# Patient Record
Sex: FEMALE | Race: WHITE | NOT HISPANIC OR LATINO | Employment: OTHER | URBAN - METROPOLITAN AREA
[De-identification: names, ages, dates, MRNs, and addresses within clinical notes are randomized per-mention and may not be internally consistent; named-entity substitution may affect disease eponyms.]

---

## 2019-04-30 ENCOUNTER — TELEPHONE (OUTPATIENT)
Dept: RHEUMATOLOGY | Facility: CLINIC | Age: 59
End: 2019-04-30

## 2019-04-30 NOTE — TELEPHONE ENCOUNTER
Printed telephone request for new patient appointment. Placed in referral folder to be contacted to schedule

## 2019-04-30 NOTE — TELEPHONE ENCOUNTER
----- Message from Zion Rasmussen sent at 4/30/2019  9:53 AM CDT -----  Contact: same  Patient called in and stated she had already spoken to Dr. Prieto, when at an appt for her sister, about becoming a NP.  Patient stated Dr. Prieto told her to call and get a file done & send message so nurse can schedule an appt.    Patient call back number is [phone]

## 2019-05-03 ENCOUNTER — TELEPHONE (OUTPATIENT)
Dept: RHEUMATOLOGY | Facility: CLINIC | Age: 59
End: 2019-05-03

## 2019-05-03 NOTE — TELEPHONE ENCOUNTER
----- Message from Kayla Zee sent at 5/3/2019 10:28 AM CDT -----  Contact: Pt  Type: Needs Medical Advice    Who Called:  Patient  Best Call Back Number: [phone] ([phone])   Additional Information: Pt stated she spoke to Dr Prieto's nurse she said she is still waiting on a call for an appt she said she spoke to Dr Prieto her self and was told that the  will see her and take her medicaid asked for a call back was told someone will be getting back with her for an appointment

## 2019-05-03 NOTE — TELEPHONE ENCOUNTER
Returned patient call regarding new patient appointment. Nurse informed of first available new patient appointment with Dr Prieto. Patient requested appointment to be made and placed on the wait list. Patient aware of date and time of appointment.

## 2019-11-21 ENCOUNTER — OFFICE VISIT (OUTPATIENT)
Dept: RHEUMATOLOGY | Facility: CLINIC | Age: 59
End: 2019-11-21
Payer: MEDICAID

## 2019-11-21 ENCOUNTER — HOSPITAL ENCOUNTER (OUTPATIENT)
Dept: RADIOLOGY | Facility: HOSPITAL | Age: 59
Discharge: HOME | End: 2019-11-21
Attending: INTERNAL MEDICINE
Payer: MEDICAID

## 2019-11-21 ENCOUNTER — IMMUNIZATION (OUTPATIENT)
Dept: PHARMACY | Facility: CLINIC | Age: 59
End: 2019-11-21
Payer: MEDICAID

## 2019-11-21 VITALS
DIASTOLIC BLOOD PRESSURE: 75 MMHG | SYSTOLIC BLOOD PRESSURE: 129 MMHG | BODY MASS INDEX: 20.82 KG/M2 | HEART RATE: 94 BPM | WEIGHT: 110.25 LBS | HEIGHT: 61 IN

## 2019-11-21 DIAGNOSIS — M48.02 CERVICAL SPINAL STENOSIS: ICD-10-CM

## 2019-11-21 DIAGNOSIS — G61.9 INFLAMMATORY NEUROPATHY: ICD-10-CM

## 2019-11-21 DIAGNOSIS — K13.79 MOUTH SORE: ICD-10-CM

## 2019-11-21 DIAGNOSIS — R27.0 ATAXIA: ICD-10-CM

## 2019-11-21 DIAGNOSIS — R76.8 ANA POSITIVE: Primary | ICD-10-CM

## 2019-11-21 DIAGNOSIS — M48.062 SPINAL STENOSIS OF LUMBAR REGION WITH NEUROGENIC CLAUDICATION: ICD-10-CM

## 2019-11-21 DIAGNOSIS — R76.8 ANA POSITIVE: ICD-10-CM

## 2019-11-21 PROCEDURE — 72100 XR LUMBAR SPINE AP AND LATERAL: ICD-10-PCS | Mod: 26,,, | Performed by: RADIOLOGY

## 2019-11-21 PROCEDURE — 72070 X-RAY EXAM THORAC SPINE 2VWS: CPT | Mod: 26,,, | Performed by: RADIOLOGY

## 2019-11-21 PROCEDURE — 72100 X-RAY EXAM L-S SPINE 2/3 VWS: CPT | Mod: 26,,, | Performed by: RADIOLOGY

## 2019-11-21 PROCEDURE — 96372 THER/PROPH/DIAG INJ SC/IM: CPT | Mod: PBBFAC,PO

## 2019-11-21 PROCEDURE — 99205 PR OFFICE/OUTPT VISIT, NEW, LEVL V, 60-74 MIN: ICD-10-PCS | Mod: 25,S$PBB,, | Performed by: INTERNAL MEDICINE

## 2019-11-21 PROCEDURE — 72070 X-RAY EXAM THORAC SPINE 2VWS: CPT | Mod: TC,FY,PO

## 2019-11-21 PROCEDURE — 72070 XR THORACIC SPINE AP LATERAL: ICD-10-PCS | Mod: 26,,, | Performed by: RADIOLOGY

## 2019-11-21 PROCEDURE — 99215 OFFICE O/P EST HI 40 MIN: CPT | Mod: PBBFAC,25,PO | Performed by: INTERNAL MEDICINE

## 2019-11-21 PROCEDURE — 72100 X-RAY EXAM L-S SPINE 2/3 VWS: CPT | Mod: TC,FY,PO

## 2019-11-21 PROCEDURE — 99999 PR PBB SHADOW E&M-EST. PATIENT-LVL V: CPT | Mod: PBBFAC,,, | Performed by: INTERNAL MEDICINE

## 2019-11-21 PROCEDURE — 99205 OFFICE O/P NEW HI 60 MIN: CPT | Mod: 25,S$PBB,, | Performed by: INTERNAL MEDICINE

## 2019-11-21 PROCEDURE — 72040 X-RAY EXAM NECK SPINE 2-3 VW: CPT | Mod: TC,FY,PO

## 2019-11-21 PROCEDURE — 72040 X-RAY EXAM NECK SPINE 2-3 VW: CPT | Mod: 26,,, | Performed by: RADIOLOGY

## 2019-11-21 PROCEDURE — 99999 PR PBB SHADOW E&M-EST. PATIENT-LVL V: ICD-10-PCS | Mod: PBBFAC,,, | Performed by: INTERNAL MEDICINE

## 2019-11-21 PROCEDURE — 72040 XR CERVICAL SPINE AP LATERAL: ICD-10-PCS | Mod: 26,,, | Performed by: RADIOLOGY

## 2019-11-21 RX ORDER — ASPIRIN 325 MG
325 TABLET ORAL
COMMUNITY
Start: 2015-11-07

## 2019-11-21 RX ORDER — KETOROLAC TROMETHAMINE 30 MG/ML
60 INJECTION, SOLUTION INTRAMUSCULAR; INTRAVENOUS
Status: COMPLETED | OUTPATIENT
Start: 2019-11-21 | End: 2019-11-21

## 2019-11-21 RX ORDER — DOCUSATE SODIUM 100 MG/1
100 CAPSULE, LIQUID FILLED ORAL
COMMUNITY

## 2019-11-21 RX ORDER — PRAVASTATIN SODIUM 40 MG/1
40 TABLET ORAL
COMMUNITY
Start: 2015-02-02

## 2019-11-21 RX ORDER — VALACYCLOVIR HYDROCHLORIDE 1 G/1
1000 TABLET, FILM COATED ORAL 3 TIMES DAILY
Qty: 42 TABLET | Refills: 3 | Status: SHIPPED | OUTPATIENT
Start: 2019-11-21 | End: 2020-03-15

## 2019-11-21 RX ORDER — MIRTAZAPINE 15 MG/1
TABLET, FILM COATED ORAL
COMMUNITY
Start: 2019-10-07 | End: 2019-11-21

## 2019-11-21 RX ORDER — DICYCLOMINE HYDROCHLORIDE 20 MG/1
20 TABLET ORAL EVERY 6 HOURS
Qty: 120 TABLET | Refills: 2 | Status: SHIPPED | OUTPATIENT
Start: 2019-11-21 | End: 2019-12-21

## 2019-11-21 RX ORDER — MULTIVITAMIN
1 TABLET ORAL
COMMUNITY

## 2019-11-21 RX ORDER — FLUOXETINE 10 MG/1
TABLET ORAL
COMMUNITY
Start: 2015-02-02 | End: 2021-02-12

## 2019-11-21 RX ORDER — CYANOCOBALAMIN 1000 UG/ML
1000 INJECTION, SOLUTION INTRAMUSCULAR; SUBCUTANEOUS
Status: COMPLETED | OUTPATIENT
Start: 2019-11-21 | End: 2019-11-21

## 2019-11-21 RX ORDER — PROMETHAZINE HYDROCHLORIDE 12.5 MG/1
TABLET ORAL
COMMUNITY
Start: 2019-01-16 | End: 2023-02-28

## 2019-11-21 RX ORDER — ONDANSETRON 4 MG/1
4 TABLET, ORALLY DISINTEGRATING ORAL EVERY 8 HOURS PRN
COMMUNITY
Start: 2018-11-05

## 2019-11-21 RX ORDER — PREGABALIN 25 MG/1
25 CAPSULE ORAL 2 TIMES DAILY
Qty: 60 CAPSULE | Refills: 6 | Status: SHIPPED | OUTPATIENT
Start: 2019-11-21 | End: 2019-12-13 | Stop reason: SDUPTHER

## 2019-11-21 RX ORDER — PANTOPRAZOLE SODIUM 40 MG/1
40 TABLET, DELAYED RELEASE ORAL
COMMUNITY
Start: 2018-01-21 | End: 2020-02-14

## 2019-11-21 RX ORDER — TRIAMCINOLONE ACETONIDE 1 MG/G
OINTMENT TOPICAL 2 TIMES DAILY
Qty: 30 G | Refills: 3 | Status: SHIPPED | OUTPATIENT
Start: 2019-11-21

## 2019-11-21 RX ORDER — BUSPIRONE HYDROCHLORIDE 10 MG/1
10 TABLET ORAL
COMMUNITY
Start: 2019-07-21 | End: 2021-02-12

## 2019-11-21 RX ORDER — ASPIRIN 81 MG/1
81 TABLET ORAL DAILY
COMMUNITY

## 2019-11-21 RX ORDER — DICLOFENAC SODIUM 10 MG/G
2 GEL TOPICAL
COMMUNITY
Start: 2019-01-04

## 2019-11-21 RX ORDER — CLONIDINE HYDROCHLORIDE 0.1 MG/1
TABLET ORAL
COMMUNITY
Start: 2015-02-02 | End: 2022-03-21

## 2019-11-21 RX ORDER — DULOXETIN HYDROCHLORIDE 20 MG/1
20 CAPSULE, DELAYED RELEASE ORAL
COMMUNITY
Start: 2019-10-07 | End: 2019-11-21

## 2019-11-21 RX ORDER — GLIPIZIDE 5 MG/1
5 TABLET ORAL
COMMUNITY
Start: 2014-09-12

## 2019-11-21 RX ORDER — GABAPENTIN 300 MG/1
300 CAPSULE ORAL
COMMUNITY
Start: 2018-04-13 | End: 2019-11-21

## 2019-11-21 RX ORDER — MEDICAL SUPPLY, MISCELLANEOUS
MISCELLANEOUS MISCELLANEOUS
COMMUNITY
Start: 2013-10-28

## 2019-11-21 RX ORDER — POLYETHYLENE GLYCOL 3350 17 G/17G
17 POWDER, FOR SOLUTION ORAL
COMMUNITY
End: 2023-02-28

## 2019-11-21 RX ORDER — CYCLOBENZAPRINE HCL 10 MG
10 TABLET ORAL
COMMUNITY
Start: 2019-07-29 | End: 2020-01-14 | Stop reason: SDUPTHER

## 2019-11-21 RX ORDER — METHYLPREDNISOLONE ACETATE 80 MG/ML
80 INJECTION, SUSPENSION INTRA-ARTICULAR; INTRALESIONAL; INTRAMUSCULAR; SOFT TISSUE
Status: COMPLETED | OUTPATIENT
Start: 2019-11-21 | End: 2019-11-21

## 2019-11-21 RX ORDER — TRAZODONE HYDROCHLORIDE 100 MG/1
TABLET ORAL
COMMUNITY
Start: 2018-01-10

## 2019-11-21 RX ORDER — LISINOPRIL AND HYDROCHLOROTHIAZIDE 10; 12.5 MG/1; MG/1
1 TABLET ORAL
COMMUNITY
Start: 2019-07-03

## 2019-11-21 RX ADMIN — METHYLPREDNISOLONE ACETATE 80 MG: 80 INJECTION, SUSPENSION INTRA-ARTICULAR; INTRALESIONAL; INTRAMUSCULAR; SOFT TISSUE at 01:11

## 2019-11-21 RX ADMIN — KETOROLAC TROMETHAMINE 60 MG: 30 INJECTION, SOLUTION INTRAMUSCULAR at 01:11

## 2019-11-21 RX ADMIN — CYANOCOBALAMIN 1000 MCG: 1000 INJECTION, SOLUTION INTRAMUSCULAR at 01:11

## 2019-11-21 NOTE — PROGRESS NOTES
Subjective:       Patient ID: Malu Holland is a 59 y.o. female.    Chief Complaint: Pain and Fatigue    HPI: pt is a 58 yo female with a past h/o etoh disorder, she quit drinking x 4 years, she  A positive rei 1: 80, spinal stenosis, she had 90 lb weight loss dx with esophageal spasm, she has horrible balance when she  Walks a straight line, sh can walk wide base. Patient complains of arthralgias and myalgias for which has been present for a few years. Pain is located in multiple joints, both shoulder(s), both elbow(s), both wrist(s), both MCP(s): 1st, 2nd, 3rd, 4th and 5th, both PIP(s): 1st, 2nd, 3rd, 4th and 5th, both DIP(s): 1st and 2nd, both hip(s), both knee(s) and both MTP(s): 1st, 2nd, 3rd, 4th and 5th, is described as aching, pulsating, shooting and throbbing, and is constant, moderate .  Associated symptoms include: crepitation, decreased range of motion, edema, effusion, tenderness and warmth.  , spinal stenosis, dry eyes, dry mouth, rash in  Mouth painful.  Mother had sclerdoderma/ crest sister had  sjogrens    Review of Systems   Constitutional: Positive for activity change, appetite change, fatigue and unexpected weight change. Negative for chills, diaphoresis and fever.   HENT: Negative for congestion, dental problem, ear discharge, ear pain, facial swelling, mouth sores, nosebleeds, postnasal drip, rhinorrhea, sinus pressure, sneezing, sore throat, tinnitus, trouble swallowing and voice change.    Eyes: Negative for photophobia, pain, discharge, redness and itching.   Respiratory: Negative for apnea, cough, chest tightness, shortness of breath and wheezing.    Cardiovascular: Positive for leg swelling. Negative for chest pain and palpitations.   Gastrointestinal: Negative for abdominal distention, abdominal pain, constipation, diarrhea, nausea and vomiting.   Endocrine: Negative for cold intolerance, heat intolerance, polydipsia and polyuria.   Genitourinary: Negative for decreased urine volume,  "difficulty urinating, dysuria, flank pain, frequency, hematuria and urgency.   Musculoskeletal: Positive for arthralgias, back pain, gait problem, joint swelling, myalgias, neck pain and neck stiffness.   Skin: Positive for rash. Negative for pallor and wound.   Allergic/Immunologic: Positive for immunocompromised state.   Neurological: Negative for dizziness, tremors, weakness, numbness and headaches.   Hematological: Negative for adenopathy. Bruises/bleeds easily.   Psychiatric/Behavioral: Negative for sleep disturbance. The patient is not nervous/anxious.          Objective:   /75 (BP Location: Left arm, Patient Position: Sitting, BP Method: Small (Automatic))   Pulse 94   Ht 5' 1" (1.549 m)   Wt 50 kg (110 lb 3.7 oz)   BMI 20.83 kg/m²      Physical Exam   Vitals reviewed.  Constitutional: She is oriented to person, place, and time. She appears distressed.   HENT:   Head: Normocephalic and atraumatic.   Mouth/Throat: Oropharynx is clear and moist.   Eyes: EOM are normal. Pupils are equal, round, and reactive to light.   Neck: Neck supple. No thyromegaly present.   Cardiovascular: Normal rate, regular rhythm and normal heart sounds.  Exam reveals no gallop and no friction rub.    No murmur heard.  Pulmonary/Chest: Breath sounds normal. She has no wheezes. She has no rales. She exhibits no tenderness.   Abdominal: There is no tenderness. There is no rebound and no guarding.       Right Side Rheumatological Exam     Examination finds the elbow normal.    The patient is tender to palpation of the shoulder, wrist, knee, 1st PIP, 1st MCP, 2nd PIP, 2nd MCP, 3rd PIP, 3rd MCP, 4th PIP, 4th MCP, 5th PIP and 5th MCP    Shoulder Exam   Tenderness Location: acromion    Range of Motion   Active abduction: abnormal   Adduction: abnormal  Sensation: normal    Knee Exam   Tenderness Location: medial joint line and LCL  Patellofemoral Crepitus: positive  Effusion: positive  Sensation: normal    Hip Exam   Tenderness " Location: posterior and lateral  Sensation: normal    Elbow/Wrist Exam   Tenderness Location: no tenderness  Sensation: normal    Left Side Rheumatological Exam     The patient is tender to palpation of the shoulder, elbow, wrist, knee, 1st PIP, 1st MCP, 2nd PIP, 2nd MCP, 3rd PIP, 3rd MCP, 4th PIP, 4th MCP, 5th PIP and 5th MCP.    Shoulder Exam   Tenderness Location: acromion    Range of Motion   Active abduction: abnormal   Sensation: normal    Knee Exam   Tenderness Location: lateral joint line and medial joint line    Patellofemoral Crepitus: positive  Effusion: positive  Sensation: normal    Hip Exam   Tenderness Location: posterior and lateral  Sensation: normal    Elbow/Wrist Exam   Sensation: normal      Back/Neck Exam   General Inspection   Gait: abnormal       Tenderness Right paramedian tenderness of the Upper C-Spine, Upper T-Spine, Upper L-Spine and SI Joint.Left paramedian tenderness of the Upper C-Spine, Upper T-Spine, SI Joint and Upper L-Spine.    Back Range of Motion   Lateral Bend Right: abnormal  Lateral Bend Left: abnormal  Rotation Right: abnormal  Rotation Left: abnormal    Neck Range of Motion   Flexion: Severely limited  Extension: Severely limited  Right Lateral Bend: abnormal  Left Lateral Bend: abnormal  Right Rotation: abnormal  Left Rotation: abnormal    Back Tests   Right Side Tests    Squat Test: unable to perform   Left Side Tests    Squat: unable to perform  Lymphadenopathy:     She has no cervical adenopathy.   Neurological: She is alert and oriented to person, place, and time. She displays weakness and atrophy.   Reflex Scores:       Brachioradialis reflexes are 1+ on the left side.       Patellar reflexes are 1+ on the right side and 1+ on the left side.  Skin: Rash noted. There is erythema. There is pallor.     Psychiatric: Mood, memory, affect and judgment normal.   Musculoskeletal: She exhibits tenderness and deformity.        BRENNON SCREEN, REFLEX TO TITER (10/02/2017 10:48 AM  CDT)  BRENNON SCREEN, REFLEX TO TITER (10/02/2017 10:48 AM CDT)   Component Value Ref Range Performed At Pathologist Signature   BRENNON, IFA Positive (A)  Comment:   (NOTE)                                     Negative   <1:80                                     Borderline  1:80                                     Positive   >1:80  [facility]FORMED AT: [facility], [address] [place] [address], PH: [phone], DIRECTOR: RADHA BARRERA MD     [facility]       BRENNON SCREEN, REFLEX TO TITER (10/02/2017 10:48 AM CDT)   Specimen   Blood (substance) - Blood     BRENNON SCREEN, REFLEX TO TITER (10/02/2017 10:48 AM CDT)   Performing Organization Address City/State/Zipcode Phone Number   [facility]             Back to top of Lab Results       CBC and differential (10/02/2017 10:48 AM CDT)  CBC and differential (10/02/2017 10:48 AM CDT)   Component Value Ref Range Performed At Pathologist Signature   CBC PROFILE RESULTS:   [facility]     WBC 3.7 (L) 4.5 - 11.0 10^3/UL [facility]     Red Blood Cell Count 4.23 4.00 - 5.20 10^6/UL [facility]     Hemoglobin 13.4 12.0 - 16.0 GM/DL [facility]     Hematocrit 39.2 35.0 - 46.0 % [facility]     MCV 92.6 80.0 - 100.0 FL [facility]     MCH 31.8 26.0 - 34.0 PG [facility]     MCHC 34.3 31.0 - 37.0 G/DL [facility]     RDW 13.8 11.5 - 14.5 % [facility]     Platelet Cnt 139 130 - 400 10^3/UL [facility]     MPV 8.2 7.4 - 10.4 FL [facility]     DIFFERENTAL RESULTS:   [facility]     Differential Type AUTO   [facility]     Neutrophils Absolute 1.7 (L) 1.8 - 8.0 10^3/UL [facility]     Lymphocytes Absolute 1.4 1.1 - 5.0 10^3/UL [facility]     Monocytes Absolute 0.3 0.2 - 1.1 10^3/UL [facility]     Eosinophils Absolute 0.2 0.0 - 0.6 10^3/UL [facility]     Basophils Absolute 0.0 0.0 - 0.2 10^3/UL [facility]     Neutrophils Relatives 48 % [facility]     Lymphocytes Relative 39 % [facility]     Monocytes Relative 8 % [facility]     Eosinophils Relative 5 % [facility]     Basophils Relative 0 % [facility]       CBC and differential (10/02/2017 10:48 AM CDT)   Specimen   Blood  (substance) - Blood     CBC and differential (10/02/2017 10:48 AM CDT)   Performing Organization Address City/State/Zipcode Phone Number   [facility]             Back to top of Lab Results       Comprehensive metabolic panel (10/02/2017 10:48 AM CDT)  Comprehensive metabolic panel (10/02/2017 10:48 AM CDT)   Component Value Ref Range Performed At Pathologist Signature   Sodium 139 135 - 146 MMOL/L [facility]     Potassium 3.9 3.6 - 5.2 MMOL/L [facility]     Chloride 98 96 - 110 MMOL/L [facility]     CO2 32 24 - 32 MMOL/L [facility]     Glucose 41 (L) 65 - 99 MG/DL [facility]     BUN 11 7 - 25 MG/DL [facility]     Creatinine 0.43 (L) 0.50 - 1.10 MG/DL [facility]     Calcium 8.8 8.4 - 10.3 MG/DL [facility]     Total Protein 6.6 6.0 - 8.0 GM/DL [facility]     ALBUMIN 4.1 3.4 - 5.0 GM/DL [facility]     Total Bilirubin 0.3 <1.3 MG/DL [facility]     AST 21 <45 U/L [facility]     Alkaline Phosphatase 43 20 - 120 U/L [facility]     ALT 25 <46 U/L [facility]     GFR MDRD Non Af Amer >105 >89 mL/MIN [facility]     GFR MDRD Af Amer >105 >89 mL/MIN [facility]       Comprehensive metabolic panel (10/02/2017 10:48 AM CDT)   Specimen   Blood (substance) - Blood     Result Narrative   Clinical History  BACK PAIN    Technique  MR Lumbar spine without contrast.  Comparison: Multiple priors including recent CT abdomen pelvis dated  03/09/2017    Findings  Vertebral height is normal and alignment is maintained.  Marrow  signal is within normal limits.  The conus medullaris terminates at  the level of L1.  No abnormal signal within the conus. The cauda  equina is normal in appearance. Mild stranding in the posterior  paraspinal subcutaneous fat.  Mild lumbosacral paraspinal muscular  fatty atrophy small lipoma in the left dorsal paraspinal musculature  at L3. Multilevel Schmorl's nodes.    T12-L1: No significant disc pathology, spinal canal, or  neuroforaminal stenosis.  L1-L2:   No significant disc pathology, spinal canal, or  neuroforaminal stenosis.  L2-L3:   No significant disc  pathology, spinal canal, or  neuroforaminal stenosis.  L3-L4:   Mild bilateral facet arthropathy.  Schmorl's node at the  superior endplate of L4. No significant disc pathology, spinal canal,  or neuroforaminal stenosis.  L4-L5:   Circumferential disc bulge. Bilateral facet arthropathy.  Left ligamentum flavum hypertrophy. New line mild thecal sac  narrowing. Mild-moderate bilateral foraminal narrowing with  questionable indentation of the left greater than right exiting L4  nerve roots.  L5-S1:   No significant disc pathology, spinal canal, or  neuroforaminal stenosis.    Impression  Spondylotic changes of L3-L4 and L4-L5, with mild thecal sac  narrowing and possible indentation of the exiting nerve roots of the  latter.   Other Result Information   Maninder, External Ris In - 05/18/2017  4:11 PM CDT  Clinical History  BACK PAIN    Technique  MR Lumbar spine without contrast.  Comparison: Multiple priors including recent CT abdomen pelvis dated  03/09/2017    Findings  Vertebral height is normal and alignment is maintained.  Marrow  signal is within normal limits.  The conus medullaris terminates at  the level of L1.  No abnormal signal within the conus. The cauda  equina is normal in appearance. Mild stranding in the posterior  paraspinal subcutaneous fat.  Mild lumbosacral paraspinal muscular  fatty atrophy small lipoma in the left dorsal paraspinal musculature  at L3. Multilevel Schmorl's nodes.    T12-L1: No significant disc pathology, spinal canal, or  neuroforaminal stenosis.  L1-L2:   No significant disc pathology, spinal canal, or  neuroforaminal stenosis.  L2-L3:   No significant disc pathology, spinal canal, or  neuroforaminal stenosis.  L3-L4:   Mild bilateral facet arthropathy.  Schmorl's node at the  superior endplate of L4. No significant disc pathology, spinal canal,  or neuroforaminal stenosis.  L4-L5:   Circumferential disc bulge. Bilateral facet arthropathy.  Left ligamentum flavum hypertrophy. New  line mild thecal sac  narrowing. Mild-moderate bilateral foraminal narrowing with  questionable indentation of the left greater than right exiting L4  nerve roots.  L5-S1:   No significant disc pathology, spinal canal, or  neuroforaminal stenosis.    Impression  Spondylotic changes of L3-L4 and L4-L5, with mild thecal sac  narrowing and possible indentation of the exiting nerve roots of the  latter.   Status     Clinical History  abdominal pain    Findings  Gas and barium upper GI series performed with real-time fluoroscopy.  Fluoroscopy was used 3.02 minutes.  Patient swallowed barium without aspiration.  Esophageal spasm was  evident.  No persistent stenosis or dilatation.  Mucosal pattern  appears normal.  Several small superficial barium collections in the  gastric mucosa may represent gastritis.  No gastroesophageal reflux  witnessed.  Duodenum appears normal.  Serial overhead abdominal radiographs obtained demonstrating transit  of contrast through the small bowel.  Barium was apparent in the  large intestine at 30 minutes.  Small intestine has a normal caliber  contour and mucosal pattern.  No abnormal filling defects were  evident.    Impression  Esophageal spasm.  Small superficial gastric barium collections suggest gastritis.  Normal appearance of the small intestine.   Other Result Information   Maninder, External Ris In - 06/21/2017  1:58 PM CDT  Clinical History  abdominal pain    Findings  Gas and barium upper GI series performed with real-time fluoroscopy.  Fluoroscopy was used 3.02 minutes.  Patient swallowed barium without aspiration.  Esophageal spasm was  evident.  No persistent stenosis or dilatation.  Mucosal pattern  appears normal.  Several small superficial barium collections in the  gastric mucosa may represent gastritis.  No gastroesophageal reflux  witnessed.  Duodenum appears normal.  Serial overhead abdominal radiographs obtained demonstrating transit  of contrast through the small bowel.   Barium was apparent in the  large intestine at 30 minutes.  Small intestine has a normal caliber  contour and mucosal pattern.  No abnormal filling defects were  evident.    Impression  Esophageal spasm.  Small superficial gastric barium collections suggest gastritis.  Normal appearance of the small intestine.   Status Results Details          Result Narrative   Clinical History  physical exam findings consistent with RTC pathology and B  pathology    Technique  Multiplanar multisequential MR images of the  shoulder were obtained  without contrast.    Findings  OSSEOUS STRUCTURES:  ALIGNMENT: Anatomical  BONE MARROW: Background bone marrow signal is normal    ACROMIOLAVICULAR ARC:  ACROMION: Bigliani Type1. No evidence of loss acromial  ACROMIOHUMERAL DISTANCE: 5 mm at humeral apex.(Normal more than 7mm).  CORACOHUMERAL DISTANCE: 12 mm . (Normal more than 12 mm).    ROTATOR CUFF:  SUPRASPINATUS: High-grade partial tear involving the distal  supraspinatus  INFRASPINATUS: . High-grade partial tear involving the distal  infraspinatus.  TERES MINOR: Normal  SUBSCAPULARIS: Minimal tendinopathy.  SUBACROMIAL SUBDELTOID BURSA: Small amount of fluid in the  subacromial subdeltoid bursa.    BICEPS COMPLEX/LABRUM:  BICEPS TENDON: The long head of the biceps tendon is well-seated in  the bicipital groove.  Mild chronic abnormality present at the biceps  pulley complex.  BICEPS ANCHOR: Normal  LABRUM: Minimal fraying is present at anterosuperior and  posterosuperior labrum.    JOINTS:  ACROMIOCLAVICULAR JOINT: Periarticular edema is noted.  GLENOHUMERAL JOINT: Confluent.  There is no significant joint  effusion. Degenerative changes edema present the superolateral  portion of the humeral head.  AXILLARY RECESS: Copious.  No mass identified. .    PERIARTICULAR SOFT TISSUES/MUSCLE:  There is mild fatty atrophy of the teres minor raising concern for  active denervation of axillary nerve. The rest of the periarticular  muscle  bulk and signal is normal. No periarticular cystic fluid  structures identified. The axillary fossae demonstrates normal signal  and physiological lymph nodes. The visualized portion of the rib cage  is unremarkable.      Impression  1 high-grade partial tear involving the distal supraspinatus and  infraspinatus.  2. Active osteoarthritis with periarticular edema at the  acromioclavicular joint.  3. Atrophy with increased signal in the teres minor suggesting  denervation injury from axillary nerve.  4. Chronic tendinopathy at the biceps with complex..     Result Impression   Impression:  Cervical spondylosis. Chronic discopathy and osteophyte formation at C5-C6 and C6-C7.  No significant spinal canal stenosis observed.  No abnormal displacement in flexion and extension views.          Electronically Signed By: Tal Curran MD 11/8/2017 4:28 PM CST   Result Narrative   EXAM END TIME:11/8/2017 11:38 AM  CLINICAL HISTORY:  DIAGNOSIS:M48.02   Spinal stenosis, cervical region  REASON FOR STUDY:neck pain  ADDITIONAL HISTORY: None.  PROVIDER COMMENTS:AP and Lateral with Flex/ext    TECHNIQUE:  AP, lateral, flexion-extension views of cervical spine.    COMPARISON:None.    FINDINGS:  There are degenerative changes of the cervical spine with intervertebral space height loss and anterior and posterior osteophytes at C5-C6 and C6-C7.  The bony spinal canal, without significant stenosis.  The alignment is normal.  Adequate range of motion in flexion-extension views. No abnormal displacement is observed.  The craniocervical junction, without significant findings.  The airways and soft tissues unremarkable.   Other Result Information   Maninder, Rad Results In - 11/08/2017  4:28 PM CST  EXAM END TIME:11/8/2017 11:38 AM  CLINICAL HISTORY:  DIAGNOSIS:M48.02   Spinal stenosis, cervical region  REASON FOR STUDY:neck pain  ADDITIONAL HISTORY: None.  PROVIDER COMMENTS:AP and Lateral with Flex/ext    TECHNIQUE:  AP, lateral,  flexion-extension views of cervical spine.    COMPARISON:None.    FINDINGS:  There are degenerative changes of the cervical spine with intervertebral space height loss and anterior and posterior osteophytes at C5-C6 and C6-C7.  The bony spinal canal, without significant stenosis.  The alignment is normal.  Adequate range of motion in flexion-extension views. No abnormal displacement is observed.  The craniocervical junction, without significant findings.  The airways and soft tissues unremarkable.    IMPRESSION:   Impression:  Cervical spondylosis. Chronic discopathy and osteophyte formation at C5-C6 and C6-C7.  No significant spinal canal stenosis observed.  No abnormal displacement in flexion and extension views.          Electronically Signed By: Tal Curran MD 11/8/2017 4:28 PM CST          Assessment:       1. BRENNON positive    2. Cervical spinal stenosis    3. Spinal stenosis of lumbar region with neurogenic claudication    4. Ataxia    5. Inflammatory neuropathy    6. Mouth sore            Plan:       Malu was seen today for pain and fatigue.    Diagnoses and all orders for this visit:    BRENNON positive  -     BRENNON Screen w/Reflex; Future  -     Anti Sm/RNP Antibody; Future  -     Anti-DNA antibody, double-stranded; Future  -     Anti-Histone Antibody; Future  -     Antimitochondrial antibody; Future  -     Anti-scleroderma antibody; Future  -     Anti-Smith antibody; Future  -     Anti-smooth muscle antibody; Future  -     Anti-thyroglobulin antibody; Future  -     Cardiolipin antibody; Future  -     CBC auto differential; Future  -     Comprehensive metabolic panel; Future  -     C-reactive protein; Future  -     Sjogrens syndrome-A extractable nuclear antibody; Future  -     Sjogrens syndrome-B extractable nuclear antibody; Future  -     Cyclic citrul peptide antibody, IgG; Future  -     IgA; Future  -     IgE; Future  -     IgG; Future  -     IgG 1, 2, 3, and 4; Future  -     IgM; Future  -     Rheumatoid  factor; Future  -     Sedimentation rate; Future  -     PTH, intact; Future  -     Vitamin B12; Future  -     Vitamin D; Future  -     TSH; Future  -     T4, free; Future  -     T3, free; Future  -     Folate; Future  -     VITAMIN B1; Future  -     X-Ray Cervical Spine AP And Lateral; Future  -     X-Ray Lumbar Spine Ap And Lateral; Future  -     X-Ray Thoracic Spine AP Lateral; Future  -     Celiac Disease HLA Genotyping; Future  -     Ambulatory consult to Neurology  -     X-Ray Lumbar Spine Ap And Lateral  -     dicyclomine (BENTYL) 20 mg tablet; Take 1 tablet (20 mg total) by mouth every 6 (six) hours.  -     triamcinolone acetonide 0.1% (KENALOG) 0.1 % ointment; Apply topically 2 (two) times daily.  -     folic acid-vit B6-vit B12 (FOLBEE) 2.5-25-1 mg Tab; Take 1 tablet by mouth once daily.    Cervical spinal stenosis  -     BRENNON Screen w/Reflex; Future  -     Anti Sm/RNP Antibody; Future  -     Anti-DNA antibody, double-stranded; Future  -     Anti-Histone Antibody; Future  -     Antimitochondrial antibody; Future  -     Anti-scleroderma antibody; Future  -     Anti-Smith antibody; Future  -     Anti-smooth muscle antibody; Future  -     Anti-thyroglobulin antibody; Future  -     Cardiolipin antibody; Future  -     CBC auto differential; Future  -     Comprehensive metabolic panel; Future  -     C-reactive protein; Future  -     Sjogrens syndrome-A extractable nuclear antibody; Future  -     Sjogrens syndrome-B extractable nuclear antibody; Future  -     Cyclic citrul peptide antibody, IgG; Future  -     IgA; Future  -     IgE; Future  -     IgG; Future  -     IgG 1, 2, 3, and 4; Future  -     IgM; Future  -     Rheumatoid factor; Future  -     Sedimentation rate; Future  -     PTH, intact; Future  -     Vitamin B12; Future  -     Vitamin D; Future  -     TSH; Future  -     T4, free; Future  -     T3, free; Future  -     Folate; Future  -     VITAMIN B1; Future  -     X-Ray Cervical Spine AP And Lateral;  Future  -     X-Ray Lumbar Spine Ap And Lateral; Future  -     X-Ray Thoracic Spine AP Lateral; Future  -     Celiac Disease HLA Genotyping; Future  -     Ambulatory consult to Neurology  -     X-Ray Lumbar Spine Ap And Lateral  -     dicyclomine (BENTYL) 20 mg tablet; Take 1 tablet (20 mg total) by mouth every 6 (six) hours.  -     triamcinolone acetonide 0.1% (KENALOG) 0.1 % ointment; Apply topically 2 (two) times daily.  -     folic acid-vit B6-vit B12 (FOLBEE) 2.5-25-1 mg Tab; Take 1 tablet by mouth once daily.    Spinal stenosis of lumbar region with neurogenic claudication  -     BRENNON Screen w/Reflex; Future  -     Anti Sm/RNP Antibody; Future  -     Anti-DNA antibody, double-stranded; Future  -     Anti-Histone Antibody; Future  -     Antimitochondrial antibody; Future  -     Anti-scleroderma antibody; Future  -     Anti-Smith antibody; Future  -     Anti-smooth muscle antibody; Future  -     Anti-thyroglobulin antibody; Future  -     Cardiolipin antibody; Future  -     CBC auto differential; Future  -     Comprehensive metabolic panel; Future  -     C-reactive protein; Future  -     Sjogrens syndrome-A extractable nuclear antibody; Future  -     Sjogrens syndrome-B extractable nuclear antibody; Future  -     Cyclic citrul peptide antibody, IgG; Future  -     IgA; Future  -     IgE; Future  -     IgG; Future  -     IgG 1, 2, 3, and 4; Future  -     IgM; Future  -     Rheumatoid factor; Future  -     Sedimentation rate; Future  -     PTH, intact; Future  -     Vitamin B12; Future  -     Vitamin D; Future  -     TSH; Future  -     T4, free; Future  -     T3, free; Future  -     Folate; Future  -     VITAMIN B1; Future  -     X-Ray Cervical Spine AP And Lateral; Future  -     X-Ray Lumbar Spine Ap And Lateral; Future  -     X-Ray Thoracic Spine AP Lateral; Future  -     Celiac Disease HLA Genotyping; Future  -     Ambulatory consult to Neurology  -     X-Ray Lumbar Spine Ap And Lateral  -     dicyclomine (BENTYL)  20 mg tablet; Take 1 tablet (20 mg total) by mouth every 6 (six) hours.  -     triamcinolone acetonide 0.1% (KENALOG) 0.1 % ointment; Apply topically 2 (two) times daily.  -     folic acid-vit B6-vit B12 (FOLBEE) 2.5-25-1 mg Tab; Take 1 tablet by mouth once daily.    Ataxia  -     BRENNON Screen w/Reflex; Future  -     Anti Sm/RNP Antibody; Future  -     Anti-DNA antibody, double-stranded; Future  -     Anti-Histone Antibody; Future  -     Antimitochondrial antibody; Future  -     Anti-scleroderma antibody; Future  -     Anti-Smith antibody; Future  -     Anti-smooth muscle antibody; Future  -     Anti-thyroglobulin antibody; Future  -     Cardiolipin antibody; Future  -     CBC auto differential; Future  -     Comprehensive metabolic panel; Future  -     C-reactive protein; Future  -     Sjogrens syndrome-A extractable nuclear antibody; Future  -     Sjogrens syndrome-B extractable nuclear antibody; Future  -     Cyclic citrul peptide antibody, IgG; Future  -     IgA; Future  -     IgE; Future  -     IgG; Future  -     IgG 1, 2, 3, and 4; Future  -     IgM; Future  -     Rheumatoid factor; Future  -     Sedimentation rate; Future  -     PTH, intact; Future  -     Vitamin B12; Future  -     Vitamin D; Future  -     TSH; Future  -     T4, free; Future  -     T3, free; Future  -     Folate; Future  -     VITAMIN B1; Future  -     X-Ray Cervical Spine AP And Lateral; Future  -     X-Ray Lumbar Spine Ap And Lateral; Future  -     X-Ray Thoracic Spine AP Lateral; Future  -     Celiac Disease HLA Genotyping; Future  -     Ambulatory consult to Neurology  -     X-Ray Lumbar Spine Ap And Lateral  -     dicyclomine (BENTYL) 20 mg tablet; Take 1 tablet (20 mg total) by mouth every 6 (six) hours.  -     triamcinolone acetonide 0.1% (KENALOG) 0.1 % ointment; Apply topically 2 (two) times daily.  -     folic acid-vit B6-vit B12 (FOLBEE) 2.5-25-1 mg Tab; Take 1 tablet by mouth once daily.    Inflammatory neuropathy  -     BRENNON Screen  w/Reflex; Future  -     Anti Sm/RNP Antibody; Future  -     Anti-DNA antibody, double-stranded; Future  -     Anti-Histone Antibody; Future  -     Antimitochondrial antibody; Future  -     Anti-scleroderma antibody; Future  -     Anti-Smith antibody; Future  -     Anti-smooth muscle antibody; Future  -     Anti-thyroglobulin antibody; Future  -     Cardiolipin antibody; Future  -     CBC auto differential; Future  -     Comprehensive metabolic panel; Future  -     C-reactive protein; Future  -     Sjogrens syndrome-A extractable nuclear antibody; Future  -     Sjogrens syndrome-B extractable nuclear antibody; Future  -     Cyclic citrul peptide antibody, IgG; Future  -     IgA; Future  -     IgE; Future  -     IgG; Future  -     IgG 1, 2, 3, and 4; Future  -     IgM; Future  -     Rheumatoid factor; Future  -     Sedimentation rate; Future  -     PTH, intact; Future  -     Vitamin B12; Future  -     Vitamin D; Future  -     TSH; Future  -     T4, free; Future  -     T3, free; Future  -     Folate; Future  -     VITAMIN B1; Future  -     X-Ray Cervical Spine AP And Lateral; Future  -     X-Ray Lumbar Spine Ap And Lateral; Future  -     X-Ray Thoracic Spine AP Lateral; Future  -     Celiac Disease HLA Genotyping; Future  -     Ambulatory consult to Neurology  -     X-Ray Lumbar Spine Ap And Lateral  -     dicyclomine (BENTYL) 20 mg tablet; Take 1 tablet (20 mg total) by mouth every 6 (six) hours.  -     triamcinolone acetonide 0.1% (KENALOG) 0.1 % ointment; Apply topically 2 (two) times daily.  -     folic acid-vit B6-vit B12 (FOLBEE) 2.5-25-1 mg Tab; Take 1 tablet by mouth once daily.    Mouth sore  -     valACYclovir (VALTREX) 1000 MG tablet; Take 1 tablet (1,000 mg total) by mouth 3 (three) times daily. for 14 days  -     triamcinolone acetonide 0.1% (KENALOG) 0.1 % ointment; Apply topically 2 (two) times daily.  -     folic acid-vit B6-vit B12 (FOLBEE) 2.5-25-1 mg Tab; Take 1 tablet by mouth once daily.    Other  orders  -     cyanocobalamin injection 1,000 mcg  -     methylPREDNISolone acetate injection 80 mg  -     ketorolac injection 60 mg  -     pregabalin (LYRICA) 25 MG capsule; Take 1 capsule (25 mg total) by mouth 2 (two) times daily.

## 2019-11-21 NOTE — PROGRESS NOTES
Administered 1 cc ( 1000 mcg/ml ) of b12 to the right upper outer gluteal. Informed of s/s to report verbalized understanding. No adverse reactions noted.    Lot # [id number]  Expiration feb 21    Administered 1 cc ( 80 mg/ml ) of depomedrol to the right upper outer gluteal. Informed of s/s to report verbalized understanding. No adverse reactions noted.    Lot # [id number]  Expiration 01/21    Administered 2 cc ( 30 mg/ml ) of toradol to the left upper outer gluteal. Informed of s/s to report verbalized understanding. No adverse reactions noted.    Lot # [id number]  Expiration 07/2021

## 2019-12-09 ENCOUNTER — TELEPHONE (OUTPATIENT)
Dept: RHEUMATOLOGY | Facility: CLINIC | Age: 59
End: 2019-12-09

## 2019-12-09 ENCOUNTER — TELEPHONE (OUTPATIENT)
Dept: NEUROLOGY | Facility: CLINIC | Age: 59
End: 2019-12-09

## 2019-12-09 NOTE — TELEPHONE ENCOUNTER
----- Message from Oneyda Lockhart sent at 12/9/2019  2:45 PM CST -----  Contact: self  Pt is returning call from someone in office    Please advise, can be reached at [phone]

## 2019-12-09 NOTE — TELEPHONE ENCOUNTER
Spoke to pt gave her the number to main campus and [facility] neuro; explained to pt that we don't currently have any new pt medicaid opening; pt verbalized understanding.

## 2019-12-12 ENCOUNTER — TELEPHONE (OUTPATIENT)
Dept: NEUROSURGERY | Facility: CLINIC | Age: 59
End: 2019-12-12

## 2019-12-12 NOTE — TELEPHONE ENCOUNTER
Pt has a referral that was in our workqueue, but she was referred to Madhavi as she needs imaging. Thanks!

## 2019-12-12 NOTE — TELEPHONE ENCOUNTER
Spoke with patient and scheduled her an appointment to see Madhavi Smith tomorrow, she indicated understanding.

## 2019-12-13 ENCOUNTER — HOSPITAL ENCOUNTER (OUTPATIENT)
Dept: RADIOLOGY | Facility: HOSPITAL | Age: 59
Discharge: HOME | End: 2019-12-13
Attending: PHYSICIAN ASSISTANT
Payer: MEDICAID

## 2019-12-13 ENCOUNTER — OFFICE VISIT (OUTPATIENT)
Dept: SPINE | Facility: CLINIC | Age: 59
End: 2019-12-13
Payer: MEDICAID

## 2019-12-13 VITALS
WEIGHT: 110.25 LBS | SYSTOLIC BLOOD PRESSURE: 131 MMHG | HEIGHT: 61 IN | BODY MASS INDEX: 20.82 KG/M2 | HEART RATE: 85 BPM | DIASTOLIC BLOOD PRESSURE: 75 MMHG

## 2019-12-13 DIAGNOSIS — M53.3 COCCYX PAIN: ICD-10-CM

## 2019-12-13 DIAGNOSIS — M53.3 COCCYX PAIN: Primary | ICD-10-CM

## 2019-12-13 PROCEDURE — 99215 OFFICE O/P EST HI 40 MIN: CPT | Mod: PBBFAC,25,PN | Performed by: PHYSICIAN ASSISTANT

## 2019-12-13 PROCEDURE — 72220 XR SACRUM AND COCCYX: ICD-10-PCS | Mod: 26,,, | Performed by: RADIOLOGY

## 2019-12-13 PROCEDURE — 99203 PR OFFICE/OUTPT VISIT, NEW, LEVL III, 30-44 MIN: ICD-10-PCS | Mod: S$PBB,,, | Performed by: PHYSICIAN ASSISTANT

## 2019-12-13 PROCEDURE — 99999 PR PBB SHADOW E&M-EST. PATIENT-LVL V: ICD-10-PCS | Mod: PBBFAC,,, | Performed by: PHYSICIAN ASSISTANT

## 2019-12-13 PROCEDURE — 72220 X-RAY EXAM SACRUM TAILBONE: CPT | Mod: TC,PO

## 2019-12-13 PROCEDURE — 72220 X-RAY EXAM SACRUM TAILBONE: CPT | Mod: 26,,, | Performed by: RADIOLOGY

## 2019-12-13 PROCEDURE — 99203 OFFICE O/P NEW LOW 30 MIN: CPT | Mod: S$PBB,,, | Performed by: PHYSICIAN ASSISTANT

## 2019-12-13 PROCEDURE — 99999 PR PBB SHADOW E&M-EST. PATIENT-LVL V: CPT | Mod: PBBFAC,,, | Performed by: PHYSICIAN ASSISTANT
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
## 2019-12-13 NOTE — LETTER
December 13, 2019      Roshan Prieto MD  [address] [facility] [address] [place] [address]           [facility]  [address]  [facility] [address]  Phone: [phone]  Fax: [phone]          Patient: Malu Holland   MR Number: [id number]   YOB: 1960   Date of Visit: 12/13/2019       Dear Dr. Roshan Prieto:    Thank you for referring Malu Holland to me for evaluation. Attached you will find relevant portions of my assessment and plan of care.    If you have questions, please do not hesitate to call me. I look forward to following Malu Holland along with you.    Sincerely,    Madhavi Smith PA-C    Enclosure  CC:  No Recipients    If you would like to receive this communication electronically, please contact [email or web address] or [phone] to request more information on [facility] access.    For providers and/or their staff who would like to refer a patient to [facility], please contact us through our one-stop-shop provider referral line, [facility] [place], at [phone].    If you feel you have received this communication in error or would no longer like to receive these types of communications, please e-mail [email or web address]

## 2019-12-13 NOTE — TELEPHONE ENCOUNTER
Pt showed to lobby, advises she has been unable to get her Lyrica. Wanted to see if she could get it filled through [facility]. If not she will see if her primary can fill through [facility].

## 2019-12-14 RX ORDER — PREGABALIN 25 MG/1
25 CAPSULE ORAL 2 TIMES DAILY
Qty: 60 CAPSULE | Refills: 5 | Status: SHIPPED | OUTPATIENT
Start: 2019-12-14 | End: 2020-02-14

## 2019-12-18 ENCOUNTER — TELEPHONE (OUTPATIENT)
Dept: RHEUMATOLOGY | Facility: CLINIC | Age: 59
End: 2019-12-18

## 2019-12-19 NOTE — TELEPHONE ENCOUNTER
----- Message from Marsha Wylie sent at 12/18/2019 10:55 AM CST -----  Contact: self  Type:  Patient Returning Call    Who Called:  self  Who Left Message for Patient:  Not sure  Does the patient know what this is regarding?:  no  Best Call Back Number:  [phone] (home)   Additional Information:  Patient received a call but she could not hear the person. Please call patient. Thanks!

## 2019-12-20 ENCOUNTER — TELEPHONE (OUTPATIENT)
Dept: RHEUMATOLOGY | Facility: CLINIC | Age: 59
End: 2019-12-20

## 2019-12-20 NOTE — TELEPHONE ENCOUNTER
Please contact pt in regards to scheduling appt. Pt has been referred by Dr. Prieto due to cervical narrowing.

## 2020-01-14 NOTE — TELEPHONE ENCOUNTER
----- Message from Roma Ayala sent at 1/14/2020  9:56 AM CST -----  Type: Needs Medical Advice    Who Called:  patient  Symptoms (please be specific):  Muscle spasms  How long has patient had these symptoms:  na  Pharmacy name and phone #:    [id number] - [place], [place] - [address]  [address]  [place] [address]  Phone: [phone] Fax: [phone]  Best Call Back Number: [phone]  Additional Information: patient was getting Cyclobenzapr 10mg tabs - take one tablet 3 times daily - from her previous Rheumatologist  - Dr Anita Cleary but is now going to Dr Prieto/patient is asking if Dr Prieto would fill this prescription for her muscle spasms?/please advise

## 2020-01-15 RX ORDER — CYCLOBENZAPRINE HCL 10 MG
10 TABLET ORAL 3 TIMES DAILY PRN
Qty: 90 TABLET | Refills: 3 | Status: SHIPPED | OUTPATIENT
Start: 2020-01-15 | End: 2020-09-23 | Stop reason: SDUPTHER

## 2020-02-07 ENCOUNTER — TELEPHONE (OUTPATIENT)
Dept: RHEUMATOLOGY | Facility: CLINIC | Age: 60
End: 2020-02-07

## 2020-02-07 NOTE — TELEPHONE ENCOUNTER
----- Message from Zion Rasmussen sent at 2/7/2020  2:54 PM CST -----  Contact: same  Patient called in and stated she accidentally cancelled her appt on 2/14/2020 at 11:30am (system would not allow us to reschedule). Patient stated her sister was coming in with her at the same time.  Patient wants to see about getting this appointment back.    Patient call back number is [phone]

## 2020-02-14 ENCOUNTER — OFFICE VISIT (OUTPATIENT)
Dept: RHEUMATOLOGY | Facility: CLINIC | Age: 60
End: 2020-02-14
Payer: MEDICAID

## 2020-02-14 VITALS
BODY MASS INDEX: 21.14 KG/M2 | WEIGHT: 112 LBS | HEIGHT: 61 IN | DIASTOLIC BLOOD PRESSURE: 76 MMHG | HEART RATE: 87 BPM | SYSTOLIC BLOOD PRESSURE: 130 MMHG

## 2020-02-14 DIAGNOSIS — E06.3 HASHIMOTO'S THYROIDITIS: ICD-10-CM

## 2020-02-14 DIAGNOSIS — M35.1 MCTD (MIXED CONNECTIVE TISSUE DISEASE): ICD-10-CM

## 2020-02-14 DIAGNOSIS — B37.81 THRUSH OF MOUTH AND ESOPHAGUS: ICD-10-CM

## 2020-02-14 DIAGNOSIS — B37.0 THRUSH OF MOUTH AND ESOPHAGUS: ICD-10-CM

## 2020-02-14 DIAGNOSIS — M35.00 SJOGREN'S SYNDROME, WITH UNSPECIFIED ORGAN INVOLVEMENT: Primary | ICD-10-CM

## 2020-02-14 DIAGNOSIS — K21.9 GERD WITHOUT ESOPHAGITIS: ICD-10-CM

## 2020-02-14 DIAGNOSIS — D61.818 PANCYTOPENIA: ICD-10-CM

## 2020-02-14 DIAGNOSIS — M47.12 CERVICAL ARTHRITIS WITH MYELOPATHY: ICD-10-CM

## 2020-02-14 PROCEDURE — 99999 PR PBB SHADOW E&M-EST. PATIENT-LVL III: ICD-10-PCS | Mod: PBBFAC,,, | Performed by: INTERNAL MEDICINE

## 2020-02-14 PROCEDURE — 99999 PR PBB SHADOW E&M-EST. PATIENT-LVL III: CPT | Mod: PBBFAC,,, | Performed by: INTERNAL MEDICINE

## 2020-02-14 PROCEDURE — 99215 PR OFFICE/OUTPT VISIT, EST, LEVL V, 40-54 MIN: ICD-10-PCS | Mod: 25,S$PBB,, | Performed by: INTERNAL MEDICINE

## 2020-02-14 PROCEDURE — 99215 OFFICE O/P EST HI 40 MIN: CPT | Mod: 25,S$PBB,, | Performed by: INTERNAL MEDICINE

## 2020-02-14 PROCEDURE — 96372 THER/PROPH/DIAG INJ SC/IM: CPT | Mod: PBBFAC,PO

## 2020-02-14 PROCEDURE — 99213 OFFICE O/P EST LOW 20 MIN: CPT | Mod: PBBFAC,PO | Performed by: INTERNAL MEDICINE

## 2020-02-14 RX ORDER — METHYLPREDNISOLONE ACETATE 80 MG/ML
80 INJECTION, SUSPENSION INTRA-ARTICULAR; INTRALESIONAL; INTRAMUSCULAR; SOFT TISSUE
Status: COMPLETED | OUTPATIENT
Start: 2020-02-14 | End: 2020-02-14

## 2020-02-14 RX ORDER — PREGABALIN 25 MG/1
25 CAPSULE ORAL 2 TIMES DAILY
Qty: 60 CAPSULE | Refills: 5 | Status: SHIPPED | OUTPATIENT
Start: 2020-02-14 | End: 2020-09-10 | Stop reason: SDUPTHER

## 2020-02-14 RX ORDER — HYDROXYCHLOROQUINE SULFATE 200 MG/1
200 TABLET, FILM COATED ORAL DAILY
Qty: 30 TABLET | Refills: 6 | Status: SHIPPED | OUTPATIENT
Start: 2020-02-14 | End: 2020-03-15

## 2020-02-14 RX ORDER — HYDROCODONE BITARTRATE AND ACETAMINOPHEN 7.5; 325 MG/1; MG/1
1 TABLET ORAL EVERY 6 HOURS PRN
Qty: 28 TABLET | Refills: 0 | Status: SHIPPED | OUTPATIENT
Start: 2020-02-14 | End: 2020-02-21

## 2020-02-14 RX ORDER — KETOROLAC TROMETHAMINE 30 MG/ML
60 INJECTION, SOLUTION INTRAMUSCULAR; INTRAVENOUS
Status: COMPLETED | OUTPATIENT
Start: 2020-02-14 | End: 2020-02-14

## 2020-02-14 RX ORDER — NYSTATIN 100000 [USP'U]/ML
6 SUSPENSION ORAL 4 TIMES DAILY
Qty: 240 ML | Refills: 1 | Status: SHIPPED | OUTPATIENT
Start: 2020-02-14 | End: 2020-02-24

## 2020-02-14 RX ORDER — CYANOCOBALAMIN 1000 UG/ML
1000 INJECTION, SOLUTION INTRAMUSCULAR; SUBCUTANEOUS
Status: COMPLETED | OUTPATIENT
Start: 2020-02-14 | End: 2020-02-14

## 2020-02-14 RX ORDER — PAROXETINE 10 MG/1
10 TABLET, FILM COATED ORAL NIGHTLY
Qty: 30 TABLET | Refills: 11 | Status: SHIPPED | OUTPATIENT
Start: 2020-02-14 | End: 2020-12-22

## 2020-02-14 RX ORDER — FLUCONAZOLE 150 MG/1
150 TABLET ORAL DAILY
Qty: 7 TABLET | Refills: 3 | Status: SHIPPED | OUTPATIENT
Start: 2020-02-14 | End: 2020-02-21

## 2020-02-14 RX ORDER — DEXLANSOPRAZOLE 60 MG/1
60 CAPSULE, DELAYED RELEASE ORAL DAILY
Qty: 30 CAPSULE | Refills: 11 | Status: SHIPPED | OUTPATIENT
Start: 2020-02-14 | End: 2021-02-12

## 2020-02-14 RX ORDER — DICYCLOMINE HYDROCHLORIDE 20 MG/1
TABLET ORAL
COMMUNITY
Start: 2020-01-15 | End: 2020-03-29

## 2020-02-14 RX ORDER — PANTOPRAZOLE SODIUM 40 MG/1
40 TABLET, DELAYED RELEASE ORAL DAILY
Qty: 30 TABLET | Refills: 11 | Status: SHIPPED | OUTPATIENT
Start: 2020-02-14 | End: 2021-07-12 | Stop reason: SDUPTHER

## 2020-02-14 RX ADMIN — CYANOCOBALAMIN 1000 MCG: 1000 INJECTION, SOLUTION INTRAMUSCULAR at 01:02

## 2020-02-14 RX ADMIN — METHYLPREDNISOLONE ACETATE 80 MG: 80 INJECTION, SUSPENSION INTRA-ARTICULAR; INTRALESIONAL; INTRAMUSCULAR; SOFT TISSUE at 01:02

## 2020-02-14 RX ADMIN — KETOROLAC TROMETHAMINE 60 MG: 60 INJECTION, SOLUTION INTRAMUSCULAR at 01:02

## 2020-02-14 ASSESSMENT — ROUTINE ASSESSMENT OF PATIENT INDEX DATA (RAPID3)
TOTAL RAPID3 SCORE: 6
PSYCHOLOGICAL DISTRESS SCORE: 4.4
FATIGUE SCORE: 2.2
PATIENT GLOBAL ASSESSMENT SCORE: 6.5
PAIN SCORE: 6.5
MDHAQ FUNCTION SCORE: 1.5

## 2020-02-14 NOTE — PROGRESS NOTES
Subjective:       Patient ID: Malu Holland is a 59 y.o. female.    Chief Complaint: Disease Management    Follow up: 58 yo female severe osteoarthritis cervical spine with spinal stenosis, A positive rei 1: 80, spinal stenosis, she had 90 lb weight loss dx with esophageal spasm, . Patient complains of arthralgias and myalgias for which has been present for a few years. Pain is located in multiple joints, both shoulder(s), both elbow(s), both wrist(s), both MCP(s): 1st, 2nd, 3rd, 4th and 5th, both PIP(s): 1st, 2nd, 3rd, 4th and 5th, both DIP(s): 1st and 2nd, both hip(s), both knee(s) and both MTP(s): 1st, 2nd, 3rd, 4th and 5th, is described as aching, pulsating, shooting and throbbing, and is constant, moderate .  Associated symptoms include: crepitation, decreased range of motion, edema, effusion, tenderness and warmth. Her weight loss has stabilized.    Review of Systems   Constitutional: Positive for activity change, appetite change and unexpected weight change. Negative for chills and diaphoresis.   HENT: Negative for congestion, dental problem, ear discharge, ear pain, facial swelling, mouth sores, nosebleeds, postnasal drip, rhinorrhea, sinus pressure, sneezing, sore throat, tinnitus and voice change.    Eyes: Negative for photophobia, pain, discharge, redness and itching.   Respiratory: Negative for apnea, cough, chest tightness, shortness of breath and wheezing.    Cardiovascular: Positive for leg swelling. Negative for chest pain and palpitations.   Gastrointestinal: Negative for abdominal distention, abdominal pain, constipation, diarrhea, nausea and vomiting.   Endocrine: Negative for cold intolerance, heat intolerance, polydipsia and polyuria.   Genitourinary: Negative for decreased urine volume, difficulty urinating, flank pain, frequency, hematuria and urgency.   Musculoskeletal: Positive for arthralgias, back pain, joint swelling, neck pain and neck stiffness.   Skin: Positive for rash. Negative for  "pallor and wound.   Allergic/Immunologic: Positive for immunocompromised state.   Neurological: Negative for dizziness, tremors, weakness and numbness.   Hematological: Negative for adenopathy. Bruises/bleeds easily.   Psychiatric/Behavioral: Negative for sleep disturbance. The patient is not nervous/anxious.          Objective:   /76 (BP Location: Left arm, Patient Position: Sitting, BP Method: Small (Automatic))   Pulse 87   Ht 5' 1" (1.549 m)   Wt 50.8 kg (112 lb)   BMI 21.16 kg/m²      Physical Exam   Vitals reviewed.  Constitutional: She is oriented to person, place, and time. No distress.   HENT:   Head: Normocephalic and atraumatic.   Mouth/Throat: Oropharynx is clear and moist.   Eyes: EOM are normal. Pupils are equal, round, and reactive to light.   Neck: Neck supple. No thyromegaly present.   Cardiovascular: Normal rate, regular rhythm and normal heart sounds.  Exam reveals no gallop and no friction rub.    No murmur heard.  Pulmonary/Chest: Breath sounds normal. She has no wheezes. She has no rales. She exhibits no tenderness.   Abdominal: There is no tenderness. There is no rebound and no guarding.       Right Side Rheumatological Exam     Examination finds the elbow normal.    The patient is tender to palpation of the shoulder, wrist, knee, 1st PIP, 1st MCP, 2nd PIP, 2nd MCP, 3rd PIP, 3rd MCP, 4th PIP, 4th MCP, 5th PIP and 5th MCP    Shoulder Exam   Tenderness Location: acromion    Range of Motion   Active abduction: abnormal   Adduction: abnormal  Sensation: normal    Knee Exam   Tenderness Location: medial joint line and LCL  Patellofemoral Crepitus: positive  Effusion: positive  Sensation: normal    Hip Exam   Tenderness Location: posterior and lateral  Sensation: normal    Elbow/Wrist Exam   Tenderness Location: no tenderness  Sensation: normal    Left Side Rheumatological Exam     The patient is tender to palpation of the shoulder, elbow, wrist, knee, 1st PIP, 1st MCP, 2nd PIP, 2nd MCP, " 3rd PIP, 3rd MCP, 4th PIP, 4th MCP, 5th PIP and 5th MCP.    Shoulder Exam   Tenderness Location: acromion    Range of Motion   Active abduction: abnormal   Sensation: normal    Knee Exam   Tenderness Location: lateral joint line and medial joint line    Patellofemoral Crepitus: positive  Effusion: positive  Sensation: normal    Hip Exam   Tenderness Location: posterior and lateral  Sensation: normal    Elbow/Wrist Exam   Sensation: normal      Back/Neck Exam   General Inspection   Gait: abnormal       Tenderness Right paramedian tenderness of the Upper C-Spine, Upper T-Spine, Upper L-Spine and SI Joint.Left paramedian tenderness of the Upper C-Spine, Upper T-Spine, SI Joint and Upper L-Spine.    Back Range of Motion   Lateral Bend Right: abnormal  Lateral Bend Left: abnormal  Rotation Right: abnormal  Rotation Left: abnormal    Neck Range of Motion   Flexion: Severely limited  Extension: Severely limited  Right Lateral Bend: abnormal  Left Lateral Bend: abnormal  Right Rotation: abnormal  Left Rotation: abnormal    Back Tests   Right Side Tests    Squat Test: unable to perform   Left Side Tests    Squat: unable to perform  Lymphadenopathy:     She has no cervical adenopathy.   Neurological: She is alert and oriented to person, place, and time. She displays weakness and atrophy.   Reflex Scores:       Brachioradialis reflexes are 1+ on the left side.       Patellar reflexes are 1+ on the right side and 1+ on the left side.  Skin: Rash noted. There is erythema. There is pallor.     Psychiatric: Mood, memory, affect and judgment normal.   Musculoskeletal: She exhibits tenderness and deformity.          Status     Clinical History  abdominal pain    Findings  Gas and barium upper GI series performed with real-time fluoroscopy.  Fluoroscopy was used 3.02 minutes.  Patient swallowed barium without aspiration.  Esophageal spasm was  evident.  No persistent stenosis or dilatation.  Mucosal pattern  appears normal.  Several  small superficial barium collections in the  gastric mucosa may represent gastritis.  No gastroesophageal reflux  witnessed.  Duodenum appears normal.  Serial overhead abdominal radiographs obtained demonstrating transit  of contrast through the small bowel.  Barium was apparent in the  large intestine at 30 minutes.  Small intestine has a normal caliber  contour and mucosal pattern.  No abnormal filling defects were  evident.    Impression  Esophageal spasm.  Small superficial gastric barium collections suggest gastritis.  Normal appearance of the small intestine.     Results for orders placed or performed in visit on 11/21/19   BRENNON Screen w/Reflex   Result Value Ref Range    BRENNON Screen Negative <1:160 Negative <1:160   Anti-DNA antibody, double-stranded   Result Value Ref Range    ds DNA Ab Negative 1:10 Negative 1:10   Anti-Histone Antibody   Result Value Ref Range    Anti-Histone Antibody 1.6 (H) 0.0 - 0.9 Units   Antimitochondrial antibody   Result Value Ref Range    Anti-Mitochon Ab IFA Negative 1:40 Negative   Anti-scleroderma antibody   Result Value Ref Range    Scleroderma SCL- 8 <20 UNITS   Anti-smooth muscle antibody   Result Value Ref Range    Smooth Muscle Ab Negative 1:40 Negative   Anti-thyroglobulin antibody   Result Value Ref Range    Thyroglobulin Ab Screen 4.0 (H) 0.0 - 3.9 IU/mL   CBC auto differential   Result Value Ref Range    WBC 8.54 3.90 - 12.70 K/uL    RBC 4.33 4.00 - 5.40 M/uL    Hemoglobin 13.8 12.0 - 16.0 g/dL    Hematocrit 42.2 37.0 - 48.5 %    Mean Corpuscular Volume 98 82 - 98 fL    Mean Corpuscular Hemoglobin 31.9 (H) 27.0 - 31.0 pg    Mean Corpuscular Hemoglobin Conc 32.7 32.0 - 36.0 g/dL    RDW 12.8 11.5 - 14.5 %    Platelets 142 (L) 150 - 350 K/uL    MPV 10.7 9.2 - 12.9 fL    Immature Granulocytes 0.2 0.0 - 0.5 %    Gran # (ANC) 5.8 1.8 - 7.7 K/uL    Immature Grans (Abs) 0.02 0.00 - 0.04 K/uL    Lymph # 2.1 1.0 - 4.8 K/uL    Mono # 0.5 0.3 - 1.0 K/uL    Eos # 0.1 0.0 - 0.5 K/uL     Baso # 0.03 0.00 - 0.20 K/uL    nRBC 0 0 /100 WBC    Gran% 67.8 38.0 - 73.0 %    Lymph% 24.8 18.0 - 48.0 %    Mono% 5.5 4.0 - 15.0 %    Eosinophil% 1.3 0.0 - 8.0 %    Basophil% 0.4 0.0 - 1.9 %    Differential Method Automated    Comprehensive metabolic panel   Result Value Ref Range    Sodium 141 136 - 145 mmol/L    Potassium 3.3 (L) 3.5 - 5.1 mmol/L    Chloride 99 95 - 110 mmol/L    CO2 30 (H) 23 - 29 mmol/L    Glucose 86 70 - 110 mg/dL    BUN, Bld 13 6 - 20 mg/dL    Creatinine 0.8 0.5 - 1.4 mg/dL    Calcium 9.9 8.7 - 10.5 mg/dL    Total Protein 7.6 6.0 - 8.4 g/dL    Albumin 4.4 3.5 - 5.2 g/dL    Total Bilirubin 0.4 0.1 - 1.0 mg/dL    Alkaline Phosphatase 56 55 - 135 U/L    AST 27 10 - 40 U/L    ALT 26 10 - 44 U/L    Anion Gap 12 8 - 16 mmol/L    eGFR if African American >60.0 >60 mL/min/1.73 m^2    eGFR if non African American >60.0 >60 mL/min/1.73 m^2   C-reactive protein   Result Value Ref Range    CRP 0.6 0.0 - 8.2 mg/L   Cyclic citrul peptide antibody, IgG   Result Value Ref Range    CCP Antibodies <0.5 <5.0 U/mL   IgA   Result Value Ref Range    IgA 142 40 - 350 mg/dL   IgE   Result Value Ref Range    IgE 62 0 - 100 IU/mL   IgG   Result Value Ref Range    IgG - Serum 1103 650 - 1600 mg/dL   IgG 1, 2, 3, and 4   Result Value Ref Range    IgG 1 426 382 - 929 mg/dL    IgG 2 559 242 - 700 mg/dL    IgG 3 79 22 - 176 mg/dL    IgG 4 26 4 - 86 mg/dL   IgM   Result Value Ref Range    IgM 81 50 - 300 mg/dL   Rheumatoid factor   Result Value Ref Range    Rheumatoid Factor <10.0 0.0 - 15.0 IU/mL   Sedimentation rate   Result Value Ref Range    Sed Rate 5 0 - 20 mm/Hr   PTH, intact   Result Value Ref Range    PTH, Intact 52.0 9.0 - 77.0 pg/mL   Vitamin B12   Result Value Ref Range    Vitamin B-12 >2000 (H) 210 - 950 pg/mL   Vitamin D   Result Value Ref Range    Vit D, 25-Hydroxy 49 30 - 96 ng/mL   TSH   Result Value Ref Range    TSH 1.286 0.400 - 4.000 uIU/mL   T4, free   Result Value Ref Range    Free T4 1.03 0.71 -  1.51 ng/dL   T3, free   Result Value Ref Range    T3, Free 2.5 2.3 - 4.2 pg/mL   Folate   Result Value Ref Range    Folate 29.6 (H) 4.0 - 24.0 ng/mL   VITAMIN B1   Result Value Ref Range    Thiamine 69 38 - 122 ug/L       reviewed labs with patient during this visit        Assessment:       1. Sjogren's syndrome, with unspecified organ involvement    2. Thrush of mouth and esophagus    3. Hashimoto's thyroiditis    4. MCTD (mixed connective tissue disease)    5. GERD without esophagitis    6. Cervical arthritis with myelopathy    7. Pancytopenia            Plan:       Malu was seen today for disease management.    Diagnoses and all orders for this visit:    Sjogren's syndrome, with unspecified organ involvement  -     paroxetine (PAXIL) 10 MG tablet; Take 1 tablet (10 mg total) by mouth every evening.  -     dexlansoprazole (DEXILANT) 60 mg capsule; Take 1 capsule (60 mg total) by mouth once daily.  -     pantoprazole (PROTONIX) 40 MG tablet; Take 1 tablet (40 mg total) by mouth once daily.  -     nystatin (MYCOSTATIN) 100,000 unit/mL suspension; Take 6 mLs (600,000 Units total) by mouth 4 (four) times daily. for 10 days  -     fluconazole (DIFLUCAN) 150 MG Tab; Take 1 tablet (150 mg total) by mouth once daily. for 7 days  -     ranitidine (ZANTAC) 300 MG tablet; Take 1 tablet (300 mg total) by mouth nightly.  -     methylPREDNISolone acetate injection 80 mg  -     ketorolac injection 60 mg  -     cyanocobalamin injection 1,000 mcg  -     hydroxychloroquine (PLAQUENIL) 200 mg tablet; Take 1 tablet (200 mg total) by mouth once daily. for 30 doses  -     BRENNON Screen w/Reflex; Future  -     Anti Sm/RNP Antibody; Future  -     Anti-DNA antibody, double-stranded; Future  -     Anti-Histone Antibody; Future  -     Anti-scleroderma antibody; Future  -     Anti-Smith antibody; Future  -     Sjogrens syndrome-A extractable nuclear antibody; Future  -     Sjogrens syndrome-B extractable nuclear antibody; Future  -      Sedimentation rate; Future  -     C-reactive protein; Future  -     Comprehensive metabolic panel; Future  -     CBC Oncology; Future  -     Protein electrophoresis, serum; Future  -     HYDROcodone-acetaminophen (NORCO) 7.5-325 mg per tablet; Take 1 tablet by mouth every 6 (six) hours as needed for Pain.    Thrush of mouth and esophagus  -     paroxetine (PAXIL) 10 MG tablet; Take 1 tablet (10 mg total) by mouth every evening.  -     dexlansoprazole (DEXILANT) 60 mg capsule; Take 1 capsule (60 mg total) by mouth once daily.  -     pantoprazole (PROTONIX) 40 MG tablet; Take 1 tablet (40 mg total) by mouth once daily.  -     nystatin (MYCOSTATIN) 100,000 unit/mL suspension; Take 6 mLs (600,000 Units total) by mouth 4 (four) times daily. for 10 days  -     fluconazole (DIFLUCAN) 150 MG Tab; Take 1 tablet (150 mg total) by mouth once daily. for 7 days  -     ranitidine (ZANTAC) 300 MG tablet; Take 1 tablet (300 mg total) by mouth nightly.  -     methylPREDNISolone acetate injection 80 mg  -     ketorolac injection 60 mg  -     cyanocobalamin injection 1,000 mcg  -     hydroxychloroquine (PLAQUENIL) 200 mg tablet; Take 1 tablet (200 mg total) by mouth once daily. for 30 doses  -     BRENNON Screen w/Reflex; Future  -     Anti Sm/RNP Antibody; Future  -     Anti-DNA antibody, double-stranded; Future  -     Anti-Histone Antibody; Future  -     Anti-scleroderma antibody; Future  -     Anti-Smith antibody; Future  -     Sjogrens syndrome-A extractable nuclear antibody; Future  -     Sjogrens syndrome-B extractable nuclear antibody; Future  -     Sedimentation rate; Future  -     C-reactive protein; Future  -     Comprehensive metabolic panel; Future  -     CBC Oncology; Future  -     Protein electrophoresis, serum; Future  -     HYDROcodone-acetaminophen (NORCO) 7.5-325 mg per tablet; Take 1 tablet by mouth every 6 (six) hours as needed for Pain.    Hashimoto's thyroiditis  -     paroxetine (PAXIL) 10 MG tablet; Take 1 tablet  (10 mg total) by mouth every evening.  -     dexlansoprazole (DEXILANT) 60 mg capsule; Take 1 capsule (60 mg total) by mouth once daily.  -     pantoprazole (PROTONIX) 40 MG tablet; Take 1 tablet (40 mg total) by mouth once daily.  -     nystatin (MYCOSTATIN) 100,000 unit/mL suspension; Take 6 mLs (600,000 Units total) by mouth 4 (four) times daily. for 10 days  -     fluconazole (DIFLUCAN) 150 MG Tab; Take 1 tablet (150 mg total) by mouth once daily. for 7 days  -     ranitidine (ZANTAC) 300 MG tablet; Take 1 tablet (300 mg total) by mouth nightly.  -     methylPREDNISolone acetate injection 80 mg  -     ketorolac injection 60 mg  -     cyanocobalamin injection 1,000 mcg  -     hydroxychloroquine (PLAQUENIL) 200 mg tablet; Take 1 tablet (200 mg total) by mouth once daily. for 30 doses  -     BRENNON Screen w/Reflex; Future  -     Anti Sm/RNP Antibody; Future  -     Anti-DNA antibody, double-stranded; Future  -     Anti-Histone Antibody; Future  -     Anti-scleroderma antibody; Future  -     Anti-Smith antibody; Future  -     Sjogrens syndrome-A extractable nuclear antibody; Future  -     Sjogrens syndrome-B extractable nuclear antibody; Future  -     Sedimentation rate; Future  -     C-reactive protein; Future  -     Comprehensive metabolic panel; Future  -     CBC Oncology; Future  -     Protein electrophoresis, serum; Future  -     HYDROcodone-acetaminophen (NORCO) 7.5-325 mg per tablet; Take 1 tablet by mouth every 6 (six) hours as needed for Pain.    MCTD (mixed connective tissue disease)  -     paroxetine (PAXIL) 10 MG tablet; Take 1 tablet (10 mg total) by mouth every evening.  -     dexlansoprazole (DEXILANT) 60 mg capsule; Take 1 capsule (60 mg total) by mouth once daily.  -     pantoprazole (PROTONIX) 40 MG tablet; Take 1 tablet (40 mg total) by mouth once daily.  -     nystatin (MYCOSTATIN) 100,000 unit/mL suspension; Take 6 mLs (600,000 Units total) by mouth 4 (four) times daily. for 10 days  -      fluconazole (DIFLUCAN) 150 MG Tab; Take 1 tablet (150 mg total) by mouth once daily. for 7 days  -     ranitidine (ZANTAC) 300 MG tablet; Take 1 tablet (300 mg total) by mouth nightly.  -     methylPREDNISolone acetate injection 80 mg  -     ketorolac injection 60 mg  -     cyanocobalamin injection 1,000 mcg  -     hydroxychloroquine (PLAQUENIL) 200 mg tablet; Take 1 tablet (200 mg total) by mouth once daily. for 30 doses  -     BRENNON Screen w/Reflex; Future  -     Anti Sm/RNP Antibody; Future  -     Anti-DNA antibody, double-stranded; Future  -     Anti-Histone Antibody; Future  -     Anti-scleroderma antibody; Future  -     Anti-Smith antibody; Future  -     Sjogrens syndrome-A extractable nuclear antibody; Future  -     Sjogrens syndrome-B extractable nuclear antibody; Future  -     Sedimentation rate; Future  -     C-reactive protein; Future  -     Comprehensive metabolic panel; Future  -     CBC Oncology; Future  -     Protein electrophoresis, serum; Future    GERD without esophagitis  -     paroxetine (PAXIL) 10 MG tablet; Take 1 tablet (10 mg total) by mouth every evening.  -     dexlansoprazole (DEXILANT) 60 mg capsule; Take 1 capsule (60 mg total) by mouth once daily.  -     pantoprazole (PROTONIX) 40 MG tablet; Take 1 tablet (40 mg total) by mouth once daily.  -     nystatin (MYCOSTATIN) 100,000 unit/mL suspension; Take 6 mLs (600,000 Units total) by mouth 4 (four) times daily. for 10 days  -     fluconazole (DIFLUCAN) 150 MG Tab; Take 1 tablet (150 mg total) by mouth once daily. for 7 days  -     ranitidine (ZANTAC) 300 MG tablet; Take 1 tablet (300 mg total) by mouth nightly.  -     methylPREDNISolone acetate injection 80 mg  -     ketorolac injection 60 mg  -     cyanocobalamin injection 1,000 mcg  -     hydroxychloroquine (PLAQUENIL) 200 mg tablet; Take 1 tablet (200 mg total) by mouth once daily. for 30 doses  -     BRENNON Screen w/Reflex; Future  -     Anti Sm/RNP Antibody; Future  -     Anti-DNA  antibody, double-stranded; Future  -     Anti-Histone Antibody; Future  -     Anti-scleroderma antibody; Future  -     Anti-Smith antibody; Future  -     Sjogrens syndrome-A extractable nuclear antibody; Future  -     Sjogrens syndrome-B extractable nuclear antibody; Future  -     Sedimentation rate; Future  -     C-reactive protein; Future  -     Comprehensive metabolic panel; Future  -     CBC Oncology; Future  -     Protein electrophoresis, serum; Future    Cervical arthritis with myelopathy  -     pregabalin (LYRICA) 25 MG capsule; Take 1 capsule (25 mg total) by mouth 2 (two) times daily.  -     nystatin (MYCOSTATIN) 100,000 unit/mL suspension; Take 6 mLs (600,000 Units total) by mouth 4 (four) times daily. for 10 days  -     fluconazole (DIFLUCAN) 150 MG Tab; Take 1 tablet (150 mg total) by mouth once daily. for 7 days  -     ranitidine (ZANTAC) 300 MG tablet; Take 1 tablet (300 mg total) by mouth nightly.  -     methylPREDNISolone acetate injection 80 mg  -     ketorolac injection 60 mg  -     cyanocobalamin injection 1,000 mcg  -     hydroxychloroquine (PLAQUENIL) 200 mg tablet; Take 1 tablet (200 mg total) by mouth once daily. for 30 doses  -     BRENNON Screen w/Reflex; Future  -     Anti Sm/RNP Antibody; Future  -     Anti-DNA antibody, double-stranded; Future  -     Anti-Histone Antibody; Future  -     Anti-scleroderma antibody; Future  -     Anti-Smith antibody; Future  -     Sjogrens syndrome-A extractable nuclear antibody; Future  -     Sjogrens syndrome-B extractable nuclear antibody; Future  -     Sedimentation rate; Future  -     C-reactive protein; Future  -     Comprehensive metabolic panel; Future  -     CBC Oncology; Future  -     Protein electrophoresis, serum; Future    Pancytopenia  -     Protein electrophoresis, serum; Future  -     Protein electrophoresis, timed urine; Future  -     CBC Oncology; Future  -     CBC Oncology  -     hydroxychloroquine (PLAQUENIL) 200 mg tablet; Take 1 tablet (200  mg total) by mouth once daily. for 30 doses  -     BRENNON Screen w/Reflex; Future  -     Anti Sm/RNP Antibody; Future  -     Anti-DNA antibody, double-stranded; Future  -     Anti-Histone Antibody; Future  -     Anti-scleroderma antibody; Future  -     Anti-Smith antibody; Future  -     Sjogrens syndrome-A extractable nuclear antibody; Future  -     Sjogrens syndrome-B extractable nuclear antibody; Future  -     Sedimentation rate; Future  -     C-reactive protein; Future  -     Comprehensive metabolic panel; Future  -     CBC Oncology; Future  -     Protein electrophoresis, serum; Future     add lyrica, add dexilant but if not filled add protonix and zantac, add diflucan and nystatin.  adding plaquenil 200 mg po qd  Information on plaquenil from acr web site was given to the patient and the risk as well as benefits of plaquenil were discussed. A script was printed for the medication but an eye exam will be done when starting this medication

## 2020-02-14 NOTE — PROGRESS NOTES
Administered 1 cc B12 1000mcg to right upper outer gluteal.  Pt tolerated well.No acute reaction noted to site. Pt instructed on S/S to report. Advised pt to waitn in lobby 15 minutes after receiving injection to monitor for any reactions. Pt verbalized understanding.     Lot: [id number]  Exp:Jul21  Administered 1 cc DepoMedrol 80mg/cc  to right upper outer gluteal. Pt tolerated well. No acute reaction noted to site. Pt instructed on S/S to report. Advised patient to wait in lobby 15 minutes after receiving injection to monitor for any reactions..  Pt verbalized understanding.     Lot: [id number]  Exp: 03/2021  Administered 2 cc  Toradol 30mg/cc  to left upper outer gluteal. Pt tolerated well. No acute reaction noted to site. Pt instructed on S/S to report. Advised patient to wait in lobby 15 minutes after receiving injection to monitor for any reactions. Pt verbalized understanding.     Lot: [id number]  Exp: [id number]

## 2020-02-18 ENCOUNTER — TELEPHONE (OUTPATIENT)
Dept: RHEUMATOLOGY | Facility: CLINIC | Age: 60
End: 2020-02-18

## 2020-02-18 ENCOUNTER — TELEPHONE (OUTPATIENT)
Dept: PHARMACY | Facility: CLINIC | Age: 60
End: 2020-02-18

## 2020-02-18 ENCOUNTER — DOCUMENTATION ONLY (OUTPATIENT)
Dept: RHEUMATOLOGY | Facility: CLINIC | Age: 60
End: 2020-02-18

## 2020-02-18 ENCOUNTER — LAB VISIT (OUTPATIENT)
Dept: LAB | Facility: HOSPITAL | Age: 60
End: 2020-02-18
Attending: INTERNAL MEDICINE
Payer: MEDICAID

## 2020-02-18 DIAGNOSIS — M47.12 CERVICAL ARTHRITIS WITH MYELOPATHY: ICD-10-CM

## 2020-02-18 DIAGNOSIS — E06.3 HASHIMOTO'S THYROIDITIS: ICD-10-CM

## 2020-02-18 DIAGNOSIS — D61.818 PANCYTOPENIA: ICD-10-CM

## 2020-02-18 DIAGNOSIS — K21.9 GERD WITHOUT ESOPHAGITIS: ICD-10-CM

## 2020-02-18 DIAGNOSIS — B37.0 THRUSH OF MOUTH AND ESOPHAGUS: ICD-10-CM

## 2020-02-18 DIAGNOSIS — M35.00 SJOGREN'S SYNDROME, WITH UNSPECIFIED ORGAN INVOLVEMENT: ICD-10-CM

## 2020-02-18 DIAGNOSIS — M35.1 MCTD (MIXED CONNECTIVE TISSUE DISEASE): ICD-10-CM

## 2020-02-18 DIAGNOSIS — B37.81 THRUSH OF MOUTH AND ESOPHAGUS: ICD-10-CM

## 2020-02-18 LAB
ALBUMIN SERPL BCP-MCNC: 4 G/DL (ref 3.5–5.2)
ALP SERPL-CCNC: 54 U/L (ref 55–135)
ALT SERPL W/O P-5'-P-CCNC: 38 U/L (ref 10–44)
ANION GAP SERPL CALC-SCNC: 12 MMOL/L (ref 8–16)
AST SERPL-CCNC: 38 U/L (ref 10–40)
BILIRUB SERPL-MCNC: 0.3 MG/DL (ref 0.1–1)
BUN SERPL-MCNC: 12 MG/DL (ref 6–20)
CALCIUM SERPL-MCNC: 9.2 MG/DL (ref 8.7–10.5)
CHLORIDE SERPL-SCNC: 102 MMOL/L (ref 95–110)
CO2 SERPL-SCNC: 25 MMOL/L (ref 23–29)
CREAT SERPL-MCNC: 0.7 MG/DL (ref 0.5–1.4)
CRP SERPL-MCNC: 0.5 MG/L (ref 0–8.2)
ERYTHROCYTE [DISTWIDTH] IN BLOOD BY AUTOMATED COUNT: 14.4 % (ref 11.5–14.5)
ERYTHROCYTE [SEDIMENTATION RATE] IN BLOOD BY WESTERGREN METHOD: 9 MM/HR (ref 0–20)
EST. GFR  (AFRICAN AMERICAN): >60 ML/MIN/1.73 M^2
EST. GFR  (NON AFRICAN AMERICAN): >60 ML/MIN/1.73 M^2
GLUCOSE SERPL-MCNC: 66 MG/DL (ref 70–110)
HCT VFR BLD AUTO: 41.6 % (ref 37–48.5)
HGB BLD-MCNC: 14 G/DL (ref 12–16)
IMM GRANULOCYTES # BLD AUTO: 0.01 K/UL (ref 0–0.04)
MCH RBC QN AUTO: 34.4 PG (ref 27–31)
MCHC RBC AUTO-ENTMCNC: 33.7 G/DL (ref 32–36)
MCV RBC AUTO: 102 FL (ref 82–98)
NEUTROPHILS # BLD AUTO: 2.8 K/UL (ref 1.8–7.7)
PLATELET # BLD AUTO: 132 K/UL (ref 150–350)
PMV BLD AUTO: 10.8 FL (ref 9.2–12.9)
POTASSIUM SERPL-SCNC: 3.9 MMOL/L (ref 3.5–5.1)
PROT SERPL-MCNC: 7.3 G/DL (ref 6–8.4)
RBC # BLD AUTO: 4.07 M/UL (ref 4–5.4)
SODIUM SERPL-SCNC: 139 MMOL/L (ref 136–145)
WBC # BLD AUTO: 5.16 K/UL (ref 3.9–12.7)

## 2020-02-18 PROCEDURE — 86235 NUCLEAR ANTIGEN ANTIBODY: CPT | Mod: 59

## 2020-02-18 PROCEDURE — 86140 C-REACTIVE PROTEIN: CPT

## 2020-02-18 PROCEDURE — 86235 NUCLEAR ANTIGEN ANTIBODY: CPT

## 2020-02-18 PROCEDURE — 86225 DNA ANTIBODY NATIVE: CPT

## 2020-02-18 PROCEDURE — 84165 PATHOLOGIST INTERPRETATION SPE: ICD-10-PCS | Mod: 26,,, | Performed by: PATHOLOGY

## 2020-02-18 PROCEDURE — 80053 COMPREHEN METABOLIC PANEL: CPT

## 2020-02-18 PROCEDURE — 86038 ANTINUCLEAR ANTIBODIES: CPT

## 2020-02-18 PROCEDURE — 83516 IMMUNOASSAY NONANTIBODY: CPT

## 2020-02-18 PROCEDURE — 85027 COMPLETE CBC AUTOMATED: CPT

## 2020-02-18 PROCEDURE — 84165 PROTEIN E-PHORESIS SERUM: CPT

## 2020-02-18 PROCEDURE — 84165 PROTEIN E-PHORESIS SERUM: CPT | Mod: 26,,, | Performed by: PATHOLOGY

## 2020-02-18 PROCEDURE — 36415 COLL VENOUS BLD VENIPUNCTURE: CPT | Mod: PO

## 2020-02-18 PROCEDURE — 85651 RBC SED RATE NONAUTOMATED: CPT | Mod: PO

## 2020-02-18 NOTE — TELEPHONE ENCOUNTER
----- Message from Brenda Benson sent at 2/18/2020 11:18 AM CST -----  Regarding: Lyrica Generic PA Approved  Good Morning,     The prior authorization for Malu Holland's Lidocaine Patches prescription has been APPROVED FROM 2/18/2020 TO 2/17/2021 with copayment of $1.00.        Patient has been notified of the decision on 2/18/2020 and patient  is picking up medication today at pharmacy.     If there are any additional questions or concerns, please contact me.     Thank You!   Brenda Benson CPhT, B.A  Patient Care Advocate   [facility]  [email or web address]  Phone: [phone] Ext [phone]  Fax: [phone]

## 2020-02-18 NOTE — TELEPHONE ENCOUNTER
Good Morning,     The prior authorization for Malu Holland's generic Lyrica 25mg Capsules (not Lidocaine Patches) prescription has been APPROVED FROM 2/18/2020 TO 2/17/2021 with copayment of $1.00.       Patient has been notified of the decision on 2/18/2020 and patient  is picking up medication today at pharmacy.    If there are any additional questions or concerns, please contact me.    Thank You!   Brenda Benson CPhT, B.A  Patient Care Advocate   [facility]  [email or web address]  Phone: [phone] Ext [phone]  Fax: [phone]

## 2020-02-18 NOTE — PROGRESS NOTES
Received Authorization paper fro [place] [facility] advises Lyrica 25mg advising they already have a PA approval on file of [facility]. Pt has already picked up medication.

## 2020-02-19 LAB
ALBUMIN SERPL ELPH-MCNC: 4.18 G/DL (ref 3.35–5.55)
ALBUMIN SERPL ELPH-MCNC: 4.18 G/DL (ref 3.35–5.55)
ALPHA1 GLOB SERPL ELPH-MCNC: 0.31 G/DL (ref 0.17–0.41)
ALPHA1 GLOB SERPL ELPH-MCNC: 0.31 G/DL (ref 0.17–0.41)
ALPHA2 GLOB SERPL ELPH-MCNC: 0.8 G/DL (ref 0.43–0.99)
ALPHA2 GLOB SERPL ELPH-MCNC: 0.8 G/DL (ref 0.43–0.99)
ANA SER QL IF: NORMAL
B-GLOBULIN SERPL ELPH-MCNC: 0.67 G/DL (ref 0.5–1.1)
B-GLOBULIN SERPL ELPH-MCNC: 0.67 G/DL (ref 0.5–1.1)
GAMMA GLOB SERPL ELPH-MCNC: 1.04 G/DL (ref 0.67–1.58)
GAMMA GLOB SERPL ELPH-MCNC: 1.04 G/DL (ref 0.67–1.58)
PATHOLOGIST INTERPRETATION SPE: NORMAL
PROT SERPL-MCNC: 7 G/DL (ref 6–8.4)
PROT SERPL-MCNC: 7 G/DL (ref 6–8.4)

## 2020-02-20 LAB
DSDNA AB SER-ACNC: NORMAL [IU]/ML
ENA SCL70 AB SER-ACNC: 7 UNITS

## 2020-02-21 ENCOUNTER — TELEPHONE (OUTPATIENT)
Dept: RHEUMATOLOGY | Facility: CLINIC | Age: 60
End: 2020-02-21

## 2020-02-21 NOTE — TELEPHONE ENCOUNTER
----- Message from Brenda Benson sent at 2/20/2020 11:03 AM CST -----  Regarding: FW: Lyrica Generic PA Approved  Good Morning,    I have addended my approval note in the patient's chart.    The PA approval was for generic Lyrica 25mg capsules, not Lidocaine patches.    Thank you!  Brenda  ----- Message -----  From: Brenda Benson  Sent: 2/18/2020  11:18 AM CST  To: [facility] Staff  Subject: Lyrica Generic PA Approved                       Good Morning,     The prior authorization for Malu Holland's Lidocaine Patches prescription has been APPROVED FROM 2/18/2020 TO 2/17/2021 with copayment of $1.00.        Patient has been notified of the decision on 2/18/2020 and patient  is picking up medication today at pharmacy.     If there are any additional questions or concerns, please contact me.     Thank You!   Brenda Benson CPhT, B.A  Patient Care Advocate   [facility]  [email or web address]  Phone: [phone] Ext [phone]  Fax: [phone]

## 2020-02-22 LAB
ANTI SM ANTIBODY: 0.09 RATIO (ref 0–0.99)
ANTI SM/RNP ANTIBODY: 0.08 RATIO (ref 0–0.99)
ANTI-SM INTERPRETATION: NEGATIVE
ANTI-SM/RNP INTERPRETATION: NEGATIVE
ANTI-SSA ANTIBODY: 0.05 RATIO (ref 0–0.99)
ANTI-SSA INTERPRETATION: NEGATIVE
ANTI-SSB ANTIBODY: 0.09 RATIO (ref 0–0.99)
ANTI-SSB INTERPRETATION: NEGATIVE

## 2020-02-24 ENCOUNTER — DOCUMENTATION ONLY (OUTPATIENT)
Dept: RHEUMATOLOGY | Facility: CLINIC | Age: 60
End: 2020-02-24

## 2020-03-03 ENCOUNTER — TELEPHONE (OUTPATIENT)
Dept: RHEUMATOLOGY | Facility: CLINIC | Age: 60
End: 2020-03-03

## 2020-03-03 NOTE — TELEPHONE ENCOUNTER
----- Message from Fany Damon sent at 3/3/2020  2:51 PM CST -----  Contact: self [phone]   Patient is requesting a call back from the nurse want to know can she take plaquenil and lyrica?   Please call the patient upon request at phone number [phone].

## 2020-03-03 NOTE — TELEPHONE ENCOUNTER
Spoke to pt, she advises that lyrica is helping a little bit and she is not taking plaquenil at this time. She is confused as to whether she should start the plaquenil with taking the lyrica. Advised would clarify instrucitons on her tx.

## 2020-03-12 NOTE — TELEPHONE ENCOUNTER
There is no interaction between lyrica and plaquenil. If she is feeling better on lyrica alone then she does not need to start plaquenil. Plaquenil may help more with joint pain and stiffness. Let me know if additional questions.

## 2020-03-13 DIAGNOSIS — K13.79 MOUTH SORE: ICD-10-CM

## 2020-03-13 NOTE — TELEPHONE ENCOUNTER
Spoke to pt, advised per Kiki that she can take both Lyrica and Plaquenil with no interaction. Pt advises she restarted the Plaquenil and stopped the lyrica. She advised she will see how she does on just the Plaquenil and determine if she should restart plaquenil. Advised that she can be on both medications for her tx if she should need to. Pt verbalized understanding.

## 2020-03-15 RX ORDER — VALACYCLOVIR HYDROCHLORIDE 1 G/1
TABLET, FILM COATED ORAL
Qty: 60 TABLET | Refills: 3 | Status: SHIPPED | OUTPATIENT
Start: 2020-03-15 | End: 2023-09-28

## 2020-03-29 RX ORDER — DICYCLOMINE HYDROCHLORIDE 20 MG/1
TABLET ORAL
Qty: 120 TABLET | Refills: 3 | Status: SHIPPED | OUTPATIENT
Start: 2020-03-29 | End: 2020-07-02

## 2020-03-31 LAB — HISTONE IGG SER IA-ACNC: 1 UNITS (ref 0–0.9)

## 2020-07-02 RX ORDER — DICYCLOMINE HYDROCHLORIDE 20 MG/1
TABLET ORAL
Qty: 120 TABLET | Refills: 0 | Status: SHIPPED | OUTPATIENT
Start: 2020-07-02 | End: 2020-12-04 | Stop reason: SDUPTHER

## 2020-07-02 NOTE — TELEPHONE ENCOUNTER
----- Message from Ariel Bush sent at 7/2/2020  8:49 AM CDT -----  Regarding: Pt advice  Contact: Pt  Type:  Patient Returning Call    Who Called:  pt  Who Left Message for Patient:  office  Does the patient know what this is regarding?:  pt is returning call to office  Best Call Back Number:    Additional Information:  Please follow up. Thank you

## 2020-07-02 NOTE — TELEPHONE ENCOUNTER
Returned patient call. Informed was calling to offer sooner appointment on same day she is scheduled. Patient stated she will keep her appointment time.

## 2020-08-20 ENCOUNTER — TELEPHONE (OUTPATIENT)
Dept: RHEUMATOLOGY | Facility: CLINIC | Age: 60
End: 2020-08-20

## 2020-08-20 NOTE — TELEPHONE ENCOUNTER
----- Message from Terri Castorena sent at 8/20/2020  1:55 PM CDT -----  Regarding: Appointment Access Request  Appointment Access Request:    Pt's Sister Maricel Clemente called to request a sooner work in appt for the pt for her 6 month follow up and would like a call back today.    Pt can be reached at [phone]

## 2020-08-20 NOTE — TELEPHONE ENCOUNTER
Returned patient call regarding missed virtual visit last month with Dr Prieto. Was able to schedule patient with Kiki for next week. Patient aware of date and time of appointment.

## 2020-09-10 DIAGNOSIS — M47.12 CERVICAL ARTHRITIS WITH MYELOPATHY: ICD-10-CM

## 2020-09-10 RX ORDER — PREGABALIN 25 MG/1
25 CAPSULE ORAL 2 TIMES DAILY
Qty: 60 CAPSULE | Refills: 5 | Status: CANCELLED | OUTPATIENT
Start: 2020-09-10 | End: 2021-03-11

## 2020-09-14 DIAGNOSIS — M47.12 CERVICAL ARTHRITIS WITH MYELOPATHY: ICD-10-CM

## 2020-09-16 RX ORDER — PREGABALIN 25 MG/1
25 CAPSULE ORAL 2 TIMES DAILY
Qty: 60 CAPSULE | Refills: 5 | Status: SHIPPED | OUTPATIENT
Start: 2020-09-16 | End: 2021-02-12 | Stop reason: SDUPTHER

## 2020-09-16 NOTE — TELEPHONE ENCOUNTER
----- Message from Arleth Bean sent at 9/16/2020  2:21 PM CDT -----  Regarding: refill  Contact: pt [phone]  Type:  RX Refill Request    Who Called:  Malu  Refill or New Rx: refill  RX Name and Strength: pregabalin (LYRICA) 25 MG capsule 60 capsule   How is the patient currently taking it? (ex. 1XDay):Take 1 capsule (25 mg total) by mouth 2 (two) times daily. - Oral  Is this a 30 day or 90 day RX:  Preferred Pharmacy with phone number:[facility]  Local or Mail Order: local  Ordering Provider:Conner  Would the patient rather a call back or a response via [facility]? Call back   Best Call Back Number:[phone]  Additional Information:

## 2020-09-23 NOTE — TELEPHONE ENCOUNTER
----- Message from Michell Tipton sent at 9/23/2020  9:46 AM CDT -----  Regarding: refill  Contact: Malu xiong  Type:  RX Refill Request    Who Called:  Malu  Refill or New Rx:  refill  RX Name and Strength:  cyclobenzaprine (FLEXERIL) 10 MG tablet  How is the patient currently taking it? (ex. 1XDay):  1 3x day  Is this a 30 day or 90 day RX:  30  Preferred Pharmacy with phone number:    [place], [place] - [address] [facility]  [address]  [address] [place] [address]  Phone: [phone] Fax: [phone]      Local or Mail Order:  local  Ordering Provider:  Conner Pickering Call Back Number:  [phone]  Additional Information:  Pls call pt back to adv

## 2020-09-28 RX ORDER — HYDROXYCHLOROQUINE SULFATE 200 MG/1
200 TABLET, FILM COATED ORAL DAILY
Qty: 90 TABLET | Refills: 3 | Status: SHIPPED | OUTPATIENT
Start: 2020-09-28 | End: 2020-12-27

## 2020-09-28 RX ORDER — CYCLOBENZAPRINE HCL 10 MG
10 TABLET ORAL 3 TIMES DAILY PRN
Qty: 90 TABLET | Refills: 3 | Status: SHIPPED | OUTPATIENT
Start: 2020-09-28 | End: 2021-02-08 | Stop reason: SDUPTHER

## 2020-11-17 DIAGNOSIS — M35.1 MCTD (MIXED CONNECTIVE TISSUE DISEASE): Primary | ICD-10-CM

## 2020-11-18 NOTE — TELEPHONE ENCOUNTER
----- Message from Tia Villanueva sent at 11/17/2020  1:04 PM CST -----  Regarding: Refill Request  Type:  RX Refill Request    Who Called: Pt  Refill or New Rx:  Refill  RX Name and Strength:  folbee Tab  How is the patient currently taking it? (ex. 1XDay):  ..  Is this a 30 day or 90 day RX:  30 day   Preferred Pharmacy with phone number:    [facility] [place], [place] - [address] [facility]  [address]  [address] [place] [address]  Phone: [phone] Fax: [phone]    Local or Mail Order:  Local   Ordering Provider:  Conner Pickering Call Back Number:  [phone]

## 2020-11-24 ENCOUNTER — DOCUMENTATION ONLY (OUTPATIENT)
Dept: RHEUMATOLOGY | Facility: CLINIC | Age: 60
End: 2020-11-24

## 2020-12-04 DIAGNOSIS — K21.9 GERD WITHOUT ESOPHAGITIS: Primary | ICD-10-CM

## 2020-12-04 RX ORDER — DICYCLOMINE HYDROCHLORIDE 20 MG/1
20 TABLET ORAL
Qty: 120 TABLET | Refills: 0 | Status: SHIPPED | OUTPATIENT
Start: 2020-12-04 | End: 2021-02-08 | Stop reason: SDUPTHER

## 2021-02-08 DIAGNOSIS — K21.9 GERD WITHOUT ESOPHAGITIS: ICD-10-CM

## 2021-02-08 NOTE — TELEPHONE ENCOUNTER
----- Message from Roosevelt James sent at 2/5/2021 12:31 PM CST -----  Type:  RX Refill Request    Who Called:  Pt   Refill or New Rx:  refill   RX Name and Strength:  cyclobenzaprine (FLEXERIL) 10 MG tablet and dicyclomine (BENTYL) 20 mg tablet  How is the patient currently taking it? (ex. 1XDay):    Is this a 30 day or 90 day RX:  30day   Preferred Pharmacy with phone number:  [facility] [phone]  Local or Mail Order:  local   Ordering Provider:  renea Pickering Call Back Number:  [phone]  Additional Information:  Please and advise

## 2021-02-09 RX ORDER — CYCLOBENZAPRINE HCL 10 MG
10 TABLET ORAL 3 TIMES DAILY PRN
Qty: 90 TABLET | Refills: 3 | Status: SHIPPED | OUTPATIENT
Start: 2021-02-09 | End: 2021-06-09 | Stop reason: SDUPTHER

## 2021-02-09 RX ORDER — DICYCLOMINE HYDROCHLORIDE 20 MG/1
20 TABLET ORAL
Qty: 120 TABLET | Refills: 3 | Status: SHIPPED | OUTPATIENT
Start: 2021-02-09 | End: 2021-07-23 | Stop reason: SDUPTHER

## 2021-02-12 ENCOUNTER — OFFICE VISIT (OUTPATIENT)
Dept: RHEUMATOLOGY | Facility: CLINIC | Age: 61
End: 2021-02-12
Payer: MEDICAID

## 2021-02-12 VITALS
DIASTOLIC BLOOD PRESSURE: 74 MMHG | BODY MASS INDEX: 21.64 KG/M2 | HEART RATE: 82 BPM | HEIGHT: 61 IN | SYSTOLIC BLOOD PRESSURE: 124 MMHG | WEIGHT: 114.63 LBS

## 2021-02-12 DIAGNOSIS — K21.9 GERD WITHOUT ESOPHAGITIS: ICD-10-CM

## 2021-02-12 DIAGNOSIS — M48.062 SPINAL STENOSIS OF LUMBAR REGION WITH NEUROGENIC CLAUDICATION: ICD-10-CM

## 2021-02-12 DIAGNOSIS — M47.12 CERVICAL ARTHRITIS WITH MYELOPATHY: ICD-10-CM

## 2021-02-12 DIAGNOSIS — E06.3 HASHIMOTO'S THYROIDITIS: ICD-10-CM

## 2021-02-12 DIAGNOSIS — D69.3 ACUTE ITP: ICD-10-CM

## 2021-02-12 DIAGNOSIS — G89.4 CHRONIC PAIN SYNDROME: ICD-10-CM

## 2021-02-12 DIAGNOSIS — M48.02 CERVICAL SPINAL STENOSIS: ICD-10-CM

## 2021-02-12 DIAGNOSIS — M35.00 SJOGREN'S SYNDROME, WITH UNSPECIFIED ORGAN INVOLVEMENT: Primary | ICD-10-CM

## 2021-02-12 DIAGNOSIS — R76.8 ANA POSITIVE: ICD-10-CM

## 2021-02-12 DIAGNOSIS — B37.81 THRUSH OF MOUTH AND ESOPHAGUS: ICD-10-CM

## 2021-02-12 DIAGNOSIS — M35.1 MCTD (MIXED CONNECTIVE TISSUE DISEASE): ICD-10-CM

## 2021-02-12 DIAGNOSIS — B37.0 THRUSH OF MOUTH AND ESOPHAGUS: ICD-10-CM

## 2021-02-12 PROCEDURE — 99999 PR PBB SHADOW E&M-EST. PATIENT-LVL III: ICD-10-PCS | Mod: PBBFAC,,, | Performed by: INTERNAL MEDICINE

## 2021-02-12 PROCEDURE — 99999 PR PBB SHADOW E&M-EST. PATIENT-LVL III: CPT | Mod: PBBFAC,,, | Performed by: INTERNAL MEDICINE

## 2021-02-12 PROCEDURE — 99213 OFFICE O/P EST LOW 20 MIN: CPT | Mod: PBBFAC,PO,25 | Performed by: INTERNAL MEDICINE

## 2021-02-12 PROCEDURE — 99215 OFFICE O/P EST HI 40 MIN: CPT | Mod: S$PBB,,, | Performed by: INTERNAL MEDICINE

## 2021-02-12 PROCEDURE — 96372 THER/PROPH/DIAG INJ SC/IM: CPT | Mod: PBBFAC,PO

## 2021-02-12 PROCEDURE — 99215 PR OFFICE/OUTPT VISIT, EST, LEVL V, 40-54 MIN: ICD-10-PCS | Mod: S$PBB,,, | Performed by: INTERNAL MEDICINE

## 2021-02-12 RX ORDER — LORAZEPAM 0.5 MG/1
0.5 TABLET ORAL
COMMUNITY

## 2021-02-12 RX ORDER — PREDNISONE 1 MG/1
3 TABLET ORAL DAILY
Qty: 90 TABLET | Refills: 2 | Status: SHIPPED | OUTPATIENT
Start: 2021-02-12 | End: 2021-04-10

## 2021-02-12 RX ORDER — FLUCONAZOLE 150 MG/1
150 TABLET ORAL DAILY
Qty: 7 TABLET | Refills: 3 | Status: SHIPPED | OUTPATIENT
Start: 2021-02-12 | End: 2021-02-19

## 2021-02-12 RX ORDER — METHYLPREDNISOLONE ACETATE 80 MG/ML
80 INJECTION, SUSPENSION INTRA-ARTICULAR; INTRALESIONAL; INTRAMUSCULAR; SOFT TISSUE
Status: COMPLETED | OUTPATIENT
Start: 2021-02-12 | End: 2021-02-12

## 2021-02-12 RX ORDER — KETOROLAC TROMETHAMINE 30 MG/ML
60 INJECTION, SOLUTION INTRAMUSCULAR; INTRAVENOUS
Status: COMPLETED | OUTPATIENT
Start: 2021-02-12 | End: 2021-02-12

## 2021-02-12 RX ORDER — HYDROCODONE BITARTRATE AND ACETAMINOPHEN 10; 325 MG/1; MG/1
1 TABLET ORAL
COMMUNITY
End: 2021-02-12 | Stop reason: SDUPTHER

## 2021-02-12 RX ORDER — NYSTATIN 100000 [USP'U]/ML
6 SUSPENSION ORAL 4 TIMES DAILY
Qty: 240 ML | Refills: 1 | Status: SHIPPED | OUTPATIENT
Start: 2021-02-12 | End: 2021-02-22

## 2021-02-12 RX ORDER — HYDROXYCHLOROQUINE SULFATE 200 MG/1
TABLET, FILM COATED ORAL
COMMUNITY
Start: 2021-01-21 | End: 2021-02-12

## 2021-02-12 RX ORDER — ACETAMINOPHEN 500 MG
TABLET ORAL
COMMUNITY

## 2021-02-12 RX ORDER — PREGABALIN 25 MG/1
25 CAPSULE ORAL 3 TIMES DAILY
Qty: 90 CAPSULE | Refills: 6 | Status: SHIPPED | OUTPATIENT
Start: 2021-02-12 | End: 2021-07-12 | Stop reason: SDUPTHER

## 2021-02-12 RX ORDER — HYDROCODONE BITARTRATE AND ACETAMINOPHEN 10; 325 MG/1; MG/1
1 TABLET ORAL EVERY 8 HOURS PRN
Qty: 90 TABLET | Refills: 0 | Status: SHIPPED | OUTPATIENT
Start: 2021-02-12 | End: 2021-03-14

## 2021-02-12 RX ADMIN — KETOROLAC TROMETHAMINE 60 MG: 60 INJECTION, SOLUTION INTRAMUSCULAR at 04:02

## 2021-02-12 RX ADMIN — METHYLPREDNISOLONE ACETATE 80 MG: 80 INJECTION, SUSPENSION INTRA-ARTICULAR; INTRALESIONAL; INTRAMUSCULAR; SOFT TISSUE at 04:02

## 2021-03-23 ENCOUNTER — DOCUMENTATION ONLY (OUTPATIENT)
Dept: RHEUMATOLOGY | Facility: CLINIC | Age: 61
End: 2021-03-23

## 2021-03-24 ENCOUNTER — TELEPHONE (OUTPATIENT)
Dept: RHEUMATOLOGY | Facility: CLINIC | Age: 61
End: 2021-03-24

## 2021-03-25 ENCOUNTER — DOCUMENTATION ONLY (OUTPATIENT)
Dept: RHEUMATOLOGY | Facility: CLINIC | Age: 61
End: 2021-03-25

## 2021-03-26 ENCOUNTER — TELEPHONE (OUTPATIENT)
Dept: RHEUMATOLOGY | Facility: CLINIC | Age: 61
End: 2021-03-26

## 2021-04-09 ENCOUNTER — DOCUMENTATION ONLY (OUTPATIENT)
Dept: RHEUMATOLOGY | Facility: CLINIC | Age: 61
End: 2021-04-09

## 2021-04-13 ENCOUNTER — DOCUMENTATION ONLY (OUTPATIENT)
Dept: RHEUMATOLOGY | Facility: CLINIC | Age: 61
End: 2021-04-13

## 2021-05-10 ENCOUNTER — DOCUMENTATION ONLY (OUTPATIENT)
Dept: RHEUMATOLOGY | Facility: CLINIC | Age: 61
End: 2021-05-10

## 2021-05-12 ENCOUNTER — PATIENT MESSAGE (OUTPATIENT)
Dept: RESEARCH | Facility: HOSPITAL | Age: 61
End: 2021-05-12

## 2021-06-09 ENCOUNTER — DOCUMENTATION ONLY (OUTPATIENT)
Dept: RHEUMATOLOGY | Facility: CLINIC | Age: 61
End: 2021-06-09

## 2021-06-10 RX ORDER — CYCLOBENZAPRINE HCL 10 MG
10 TABLET ORAL 3 TIMES DAILY PRN
Qty: 90 TABLET | Refills: 3 | Status: SHIPPED | OUTPATIENT
Start: 2021-06-10 | End: 2021-10-29 | Stop reason: SDUPTHER

## 2021-07-12 ENCOUNTER — OFFICE VISIT (OUTPATIENT)
Dept: RHEUMATOLOGY | Facility: CLINIC | Age: 61
End: 2021-07-12
Payer: MEDICAID

## 2021-07-12 VITALS
HEART RATE: 75 BPM | DIASTOLIC BLOOD PRESSURE: 77 MMHG | SYSTOLIC BLOOD PRESSURE: 131 MMHG | HEIGHT: 61 IN | BODY MASS INDEX: 21.52 KG/M2 | WEIGHT: 114 LBS

## 2021-07-12 DIAGNOSIS — D69.3 ACUTE ITP: ICD-10-CM

## 2021-07-12 DIAGNOSIS — M35.1 MCTD (MIXED CONNECTIVE TISSUE DISEASE): ICD-10-CM

## 2021-07-12 DIAGNOSIS — M35.00 SJOGREN'S SYNDROME, WITH UNSPECIFIED ORGAN INVOLVEMENT: ICD-10-CM

## 2021-07-12 DIAGNOSIS — B37.0 THRUSH OF MOUTH AND ESOPHAGUS: ICD-10-CM

## 2021-07-12 DIAGNOSIS — G89.4 CHRONIC PAIN SYNDROME: ICD-10-CM

## 2021-07-12 DIAGNOSIS — B37.81 THRUSH OF MOUTH AND ESOPHAGUS: ICD-10-CM

## 2021-07-12 DIAGNOSIS — R76.8 ANA POSITIVE: ICD-10-CM

## 2021-07-12 DIAGNOSIS — M48.062 SPINAL STENOSIS OF LUMBAR REGION WITH NEUROGENIC CLAUDICATION: ICD-10-CM

## 2021-07-12 DIAGNOSIS — K21.9 GERD WITHOUT ESOPHAGITIS: ICD-10-CM

## 2021-07-12 DIAGNOSIS — E06.3 HASHIMOTO'S THYROIDITIS: ICD-10-CM

## 2021-07-12 DIAGNOSIS — M45.9 ANKYLOSING SPONDYLITIS, UNSPECIFIED SITE OF SPINE: ICD-10-CM

## 2021-07-12 DIAGNOSIS — M48.02 CERVICAL SPINAL STENOSIS: ICD-10-CM

## 2021-07-12 DIAGNOSIS — M47.12 CERVICAL ARTHRITIS WITH MYELOPATHY: Primary | ICD-10-CM

## 2021-07-12 PROCEDURE — 99999 PR PBB SHADOW E&M-EST. PATIENT-LVL III: ICD-10-PCS | Mod: PBBFAC,,, | Performed by: INTERNAL MEDICINE

## 2021-07-12 PROCEDURE — 99999 PR PBB SHADOW E&M-EST. PATIENT-LVL III: CPT | Mod: PBBFAC,,, | Performed by: INTERNAL MEDICINE

## 2021-07-12 PROCEDURE — 99215 PR OFFICE/OUTPT VISIT, EST, LEVL V, 40-54 MIN: ICD-10-PCS | Mod: 25,S$PBB,, | Performed by: INTERNAL MEDICINE

## 2021-07-12 PROCEDURE — 99213 OFFICE O/P EST LOW 20 MIN: CPT | Mod: PBBFAC,PN | Performed by: INTERNAL MEDICINE

## 2021-07-12 PROCEDURE — 96372 THER/PROPH/DIAG INJ SC/IM: CPT | Mod: PBBFAC,PN

## 2021-07-12 PROCEDURE — 99215 OFFICE O/P EST HI 40 MIN: CPT | Mod: 25,S$PBB,, | Performed by: INTERNAL MEDICINE

## 2021-07-12 RX ORDER — KETOROLAC TROMETHAMINE 30 MG/ML
60 INJECTION, SOLUTION INTRAMUSCULAR; INTRAVENOUS
Status: COMPLETED | OUTPATIENT
Start: 2021-07-12 | End: 2021-07-12

## 2021-07-12 RX ORDER — CYANOCOBALAMIN 1000 UG/ML
1000 INJECTION, SOLUTION INTRAMUSCULAR; SUBCUTANEOUS
Status: COMPLETED | OUTPATIENT
Start: 2021-07-12 | End: 2021-07-12

## 2021-07-12 RX ORDER — PREGABALIN 25 MG/1
25 CAPSULE ORAL 3 TIMES DAILY
Qty: 90 CAPSULE | Refills: 6 | Status: SHIPPED | OUTPATIENT
Start: 2021-07-12 | End: 2021-08-08

## 2021-07-12 RX ORDER — SULFASALAZINE 500 MG
TABLET ORAL
COMMUNITY

## 2021-07-12 RX ORDER — GUAIFENESIN 600 MG/1
1200 TABLET, EXTENDED RELEASE ORAL
COMMUNITY

## 2021-07-12 RX ORDER — HYDROXYCHLOROQUINE SULFATE 200 MG/1
200 TABLET, FILM COATED ORAL DAILY
Qty: 30 TABLET | Refills: 11 | Status: SHIPPED | OUTPATIENT
Start: 2021-07-12 | End: 2022-02-14

## 2021-07-12 RX ORDER — DEXAMETHASONE SODIUM PHOSPHATE 4 MG/ML
4 INJECTION, SOLUTION INTRA-ARTICULAR; INTRALESIONAL; INTRAMUSCULAR; INTRAVENOUS; SOFT TISSUE
Status: COMPLETED | OUTPATIENT
Start: 2021-07-12 | End: 2021-07-12

## 2021-07-12 RX ORDER — HYDROXYCHLOROQUINE SULFATE 200 MG/1
200 TABLET, FILM COATED ORAL DAILY
COMMUNITY
Start: 2021-06-30 | End: 2021-07-12 | Stop reason: SDUPTHER

## 2021-07-12 RX ORDER — ZINC GLUCONATE 50 MG
50 TABLET ORAL
COMMUNITY

## 2021-07-12 RX ORDER — CALCIUM CARBONATE 600 MG
600 TABLET ORAL DAILY
COMMUNITY

## 2021-07-12 RX ORDER — METHYLPREDNISOLONE ACETATE 80 MG/ML
80 INJECTION, SUSPENSION INTRA-ARTICULAR; INTRALESIONAL; INTRAMUSCULAR; SOFT TISSUE
Status: COMPLETED | OUTPATIENT
Start: 2021-07-12 | End: 2021-07-12

## 2021-07-12 RX ORDER — BUPROPION HYDROCHLORIDE 150 MG/1
150 TABLET, EXTENDED RELEASE ORAL 2 TIMES DAILY
COMMUNITY
Start: 2021-06-01 | End: 2023-02-28

## 2021-07-12 RX ORDER — NYSTATIN 100000 [USP'U]/ML
SUSPENSION ORAL
COMMUNITY
Start: 2021-02-22

## 2021-07-12 RX ORDER — HYDROCODONE BITARTRATE AND ACETAMINOPHEN 10; 325 MG/1; MG/1
1 TABLET ORAL EVERY 6 HOURS PRN
Qty: 28 TABLET | Refills: 0 | Status: SHIPPED | OUTPATIENT
Start: 2021-07-12 | End: 2021-07-19

## 2021-07-12 RX ORDER — FLUCONAZOLE 150 MG/1
150 TABLET ORAL ONCE
COMMUNITY
Start: 2021-03-08

## 2021-07-12 RX ORDER — PANTOPRAZOLE SODIUM 40 MG/1
40 TABLET, DELAYED RELEASE ORAL DAILY
Qty: 30 TABLET | Refills: 11 | Status: SHIPPED | OUTPATIENT
Start: 2021-07-12 | End: 2022-04-18 | Stop reason: SDUPTHER

## 2021-07-12 RX ADMIN — METHYLPREDNISOLONE ACETATE 80 MG: 80 INJECTION, SUSPENSION INTRA-ARTICULAR; INTRALESIONAL; INTRAMUSCULAR; SOFT TISSUE at 05:07

## 2021-07-12 RX ADMIN — KETOROLAC TROMETHAMINE 60 MG: 60 INJECTION, SOLUTION INTRAMUSCULAR at 05:07

## 2021-07-12 RX ADMIN — DEXAMETHASONE SODIUM PHOSPHATE 4 MG: 4 INJECTION INTRA-ARTICULAR; INTRALESIONAL; INTRAMUSCULAR; INTRAVENOUS; SOFT TISSUE at 05:07

## 2021-07-12 RX ADMIN — CYANOCOBALAMIN 1000 MCG: 1000 INJECTION, SOLUTION INTRAMUSCULAR at 05:07

## 2021-07-23 DIAGNOSIS — K21.9 GERD WITHOUT ESOPHAGITIS: ICD-10-CM

## 2021-07-23 RX ORDER — DICYCLOMINE HYDROCHLORIDE 20 MG/1
20 TABLET ORAL
Qty: 120 TABLET | Refills: 3 | Status: SHIPPED | OUTPATIENT
Start: 2021-07-23 | End: 2022-03-09 | Stop reason: SDUPTHER

## 2021-09-29 DIAGNOSIS — M35.1 MCTD (MIXED CONNECTIVE TISSUE DISEASE): ICD-10-CM

## 2021-11-01 RX ORDER — CYCLOBENZAPRINE HCL 10 MG
10 TABLET ORAL 3 TIMES DAILY PRN
Qty: 90 TABLET | Refills: 3 | Status: SHIPPED | OUTPATIENT
Start: 2021-11-01 | End: 2022-04-18 | Stop reason: SDUPTHER

## 2022-02-08 ENCOUNTER — LAB VISIT (OUTPATIENT)
Dept: LAB | Facility: HOSPITAL | Age: 62
End: 2022-02-08
Attending: INTERNAL MEDICINE
Payer: MEDICAID

## 2022-02-08 DIAGNOSIS — M48.02 CERVICAL SPINAL STENOSIS: ICD-10-CM

## 2022-02-08 DIAGNOSIS — M47.12 CERVICAL ARTHRITIS WITH MYELOPATHY: ICD-10-CM

## 2022-02-08 DIAGNOSIS — M35.00 SJOGREN'S SYNDROME, WITH UNSPECIFIED ORGAN INVOLVEMENT: ICD-10-CM

## 2022-02-08 DIAGNOSIS — G89.4 CHRONIC PAIN SYNDROME: ICD-10-CM

## 2022-02-08 DIAGNOSIS — M48.062 SPINAL STENOSIS OF LUMBAR REGION WITH NEUROGENIC CLAUDICATION: ICD-10-CM

## 2022-02-08 DIAGNOSIS — E06.3 HASHIMOTO'S THYROIDITIS: ICD-10-CM

## 2022-02-08 LAB
CRP SERPL-MCNC: <0.3 MG/L (ref 0–8.2)
ERYTHROCYTE [SEDIMENTATION RATE] IN BLOOD BY WESTERGREN METHOD: 14 MM/HR (ref 0–36)

## 2022-02-08 PROCEDURE — 81375 HLA II TYPING AG EQUIV LR: CPT | Mod: PO

## 2022-02-08 PROCEDURE — 86705 HEP B CORE ANTIBODY IGM: CPT | Performed by: INTERNAL MEDICINE

## 2022-02-08 PROCEDURE — 81374 HLA I TYPING 1 ANTIGEN LR: CPT | Mod: PO | Performed by: INTERNAL MEDICINE

## 2022-02-08 PROCEDURE — 85652 RBC SED RATE AUTOMATED: CPT | Performed by: INTERNAL MEDICINE

## 2022-02-08 PROCEDURE — 86665 EPSTEIN-BARR CAPSID VCA: CPT | Performed by: INTERNAL MEDICINE

## 2022-02-08 PROCEDURE — 86140 C-REACTIVE PROTEIN: CPT | Performed by: INTERNAL MEDICINE

## 2022-02-08 PROCEDURE — 86706 HEP B SURFACE ANTIBODY: CPT | Performed by: INTERNAL MEDICINE

## 2022-02-08 PROCEDURE — 86803 HEPATITIS C AB TEST: CPT | Performed by: INTERNAL MEDICINE

## 2022-02-08 PROCEDURE — 87340 HEPATITIS B SURFACE AG IA: CPT | Performed by: INTERNAL MEDICINE

## 2022-02-09 LAB
HBV CORE IGM SERPL QL IA: NEGATIVE
HBV SURFACE AB SER-ACNC: NEGATIVE M[IU]/ML
HBV SURFACE AG SERPL QL IA: NEGATIVE
HCV AB SERPL QL IA: NEGATIVE

## 2022-02-11 LAB
EBV EA IGG SER-ACNC: 16.8 U/ML
EBV NA IGG SER-ACNC: 134 U/ML
EBV VCA IGG SER-ACNC: >750 U/ML
EBV VCA IGM SER-ACNC: <10 U/ML

## 2022-02-14 ENCOUNTER — OFFICE VISIT (OUTPATIENT)
Dept: RHEUMATOLOGY | Facility: CLINIC | Age: 62
End: 2022-02-14
Payer: MEDICAID

## 2022-02-14 VITALS
SYSTOLIC BLOOD PRESSURE: 135 MMHG | WEIGHT: 110.69 LBS | HEART RATE: 75 BPM | BODY MASS INDEX: 20.9 KG/M2 | HEIGHT: 61 IN | DIASTOLIC BLOOD PRESSURE: 76 MMHG

## 2022-02-14 DIAGNOSIS — M48.02 CERVICAL SPINAL STENOSIS: ICD-10-CM

## 2022-02-14 DIAGNOSIS — B37.81 THRUSH OF MOUTH AND ESOPHAGUS: ICD-10-CM

## 2022-02-14 DIAGNOSIS — G44.89 OTHER HEADACHE SYNDROME: ICD-10-CM

## 2022-02-14 DIAGNOSIS — M45.9 ANKYLOSING SPONDYLITIS, UNSPECIFIED SITE OF SPINE: ICD-10-CM

## 2022-02-14 DIAGNOSIS — E06.3 HASHIMOTO'S THYROIDITIS: ICD-10-CM

## 2022-02-14 DIAGNOSIS — M06.00 SERONEGATIVE RHEUMATOID ARTHRITIS: ICD-10-CM

## 2022-02-14 DIAGNOSIS — R76.8 ANA POSITIVE: ICD-10-CM

## 2022-02-14 DIAGNOSIS — L40.50 PSA (PSORIATIC ARTHRITIS): ICD-10-CM

## 2022-02-14 DIAGNOSIS — D69.3 ACUTE ITP: ICD-10-CM

## 2022-02-14 DIAGNOSIS — M35.1 MCTD (MIXED CONNECTIVE TISSUE DISEASE): Primary | ICD-10-CM

## 2022-02-14 DIAGNOSIS — K21.9 GERD WITHOUT ESOPHAGITIS: ICD-10-CM

## 2022-02-14 DIAGNOSIS — B37.0 THRUSH OF MOUTH AND ESOPHAGUS: ICD-10-CM

## 2022-02-14 DIAGNOSIS — R42 DIZZINESS AND GIDDINESS: ICD-10-CM

## 2022-02-14 DIAGNOSIS — M48.062 SPINAL STENOSIS OF LUMBAR REGION WITH NEUROGENIC CLAUDICATION: ICD-10-CM

## 2022-02-14 DIAGNOSIS — M35.00 SJOGREN'S SYNDROME, WITH UNSPECIFIED ORGAN INVOLVEMENT: ICD-10-CM

## 2022-02-14 DIAGNOSIS — G89.4 CHRONIC PAIN SYNDROME: ICD-10-CM

## 2022-02-14 DIAGNOSIS — M47.12 CERVICAL ARTHRITIS WITH MYELOPATHY: ICD-10-CM

## 2022-02-14 PROCEDURE — 3075F PR MOST RECENT SYSTOLIC BLOOD PRESS GE 130-139MM HG: ICD-10-PCS | Mod: CPTII,,, | Performed by: INTERNAL MEDICINE

## 2022-02-14 PROCEDURE — 4010F ACE/ARB THERAPY RXD/TAKEN: CPT | Mod: CPTII,,, | Performed by: INTERNAL MEDICINE

## 2022-02-14 PROCEDURE — 99214 OFFICE O/P EST MOD 30 MIN: CPT | Mod: 25,S$PBB,, | Performed by: INTERNAL MEDICINE

## 2022-02-14 PROCEDURE — 3008F PR BODY MASS INDEX (BMI) DOCUMENTED: ICD-10-PCS | Mod: CPTII,,, | Performed by: INTERNAL MEDICINE

## 2022-02-14 PROCEDURE — 99999 PR PBB SHADOW E&M-EST. PATIENT-LVL V: CPT | Mod: PBBFAC,,, | Performed by: INTERNAL MEDICINE

## 2022-02-14 PROCEDURE — 3075F SYST BP GE 130 - 139MM HG: CPT | Mod: CPTII,,, | Performed by: INTERNAL MEDICINE

## 2022-02-14 PROCEDURE — 3008F BODY MASS INDEX DOCD: CPT | Mod: CPTII,,, | Performed by: INTERNAL MEDICINE

## 2022-02-14 PROCEDURE — 3078F DIAST BP <80 MM HG: CPT | Mod: CPTII,,, | Performed by: INTERNAL MEDICINE

## 2022-02-14 PROCEDURE — 4010F PR ACE/ARB THEARPY RXD/TAKEN: ICD-10-PCS | Mod: CPTII,,, | Performed by: INTERNAL MEDICINE

## 2022-02-14 PROCEDURE — 99999 PR PBB SHADOW E&M-EST. PATIENT-LVL V: ICD-10-PCS | Mod: PBBFAC,,, | Performed by: INTERNAL MEDICINE

## 2022-02-14 PROCEDURE — 99214 PR OFFICE/OUTPT VISIT, EST, LEVL IV, 30-39 MIN: ICD-10-PCS | Mod: 25,S$PBB,, | Performed by: INTERNAL MEDICINE

## 2022-02-14 PROCEDURE — 99215 OFFICE O/P EST HI 40 MIN: CPT | Mod: PBBFAC,PN,25 | Performed by: INTERNAL MEDICINE

## 2022-02-14 PROCEDURE — 1159F MED LIST DOCD IN RCRD: CPT | Mod: CPTII,,, | Performed by: INTERNAL MEDICINE

## 2022-02-14 PROCEDURE — 96372 THER/PROPH/DIAG INJ SC/IM: CPT | Mod: PBBFAC,PN

## 2022-02-14 PROCEDURE — 3078F PR MOST RECENT DIASTOLIC BLOOD PRESSURE < 80 MM HG: ICD-10-PCS | Mod: CPTII,,, | Performed by: INTERNAL MEDICINE

## 2022-02-14 PROCEDURE — 1159F PR MEDICATION LIST DOCUMENTED IN MEDICAL RECORD: ICD-10-PCS | Mod: CPTII,,, | Performed by: INTERNAL MEDICINE

## 2022-02-14 RX ORDER — TOFACITINIB 11 MG/1
1 TABLET, FILM COATED, EXTENDED RELEASE ORAL DAILY
Qty: 30 TABLET | Refills: 12 | Status: SHIPPED | OUTPATIENT
Start: 2022-02-14 | End: 2022-03-23

## 2022-02-14 RX ORDER — CYCLOSPORINE 0.5 MG/ML
EMULSION OPHTHALMIC
COMMUNITY
Start: 2021-12-08

## 2022-02-14 RX ORDER — OMEPRAZOLE 20 MG/1
20 CAPSULE, DELAYED RELEASE ORAL
COMMUNITY
Start: 2021-06-29 | End: 2022-03-21

## 2022-02-14 RX ORDER — CYANOCOBALAMIN 1000 UG/ML
1000 INJECTION, SOLUTION INTRAMUSCULAR; SUBCUTANEOUS
Status: COMPLETED | OUTPATIENT
Start: 2022-02-14 | End: 2022-02-14

## 2022-02-14 RX ORDER — DEXAMETHASONE SODIUM PHOSPHATE 4 MG/ML
4 INJECTION, SOLUTION INTRA-ARTICULAR; INTRALESIONAL; INTRAMUSCULAR; INTRAVENOUS; SOFT TISSUE
Status: COMPLETED | OUTPATIENT
Start: 2022-02-14 | End: 2022-02-14

## 2022-02-14 RX ORDER — METHYLPREDNISOLONE ACETATE 80 MG/ML
80 INJECTION, SUSPENSION INTRA-ARTICULAR; INTRALESIONAL; INTRAMUSCULAR; SOFT TISSUE
Status: COMPLETED | OUTPATIENT
Start: 2022-02-14 | End: 2022-02-14

## 2022-02-14 RX ORDER — PREGABALIN 25 MG/1
25 CAPSULE ORAL 3 TIMES DAILY
Qty: 90 CAPSULE | Refills: 5 | Status: SHIPPED | OUTPATIENT
Start: 2022-02-14 | End: 2022-09-10

## 2022-02-14 RX ORDER — HYDROCODONE BITARTRATE AND ACETAMINOPHEN 10; 325 MG/1; MG/1
1 TABLET ORAL EVERY 8 HOURS PRN
Qty: 90 TABLET | Refills: 0 | Status: SHIPPED | OUTPATIENT
Start: 2022-02-14 | End: 2022-03-16

## 2022-02-14 RX ORDER — CETIRIZINE HYDROCHLORIDE 10 MG/1
10 TABLET ORAL DAILY PRN
COMMUNITY
Start: 2022-01-28

## 2022-02-14 RX ORDER — KETOROLAC TROMETHAMINE 30 MG/ML
60 INJECTION, SOLUTION INTRAMUSCULAR; INTRAVENOUS
Status: COMPLETED | OUTPATIENT
Start: 2022-02-14 | End: 2022-02-14

## 2022-02-14 RX ADMIN — KETOROLAC TROMETHAMINE 60 MG: 60 INJECTION, SOLUTION INTRAMUSCULAR at 04:02

## 2022-02-14 RX ADMIN — CYANOCOBALAMIN 1000 MCG: 1000 INJECTION INTRAMUSCULAR; SUBCUTANEOUS at 04:02

## 2022-02-14 RX ADMIN — DEXAMETHASONE SODIUM PHOSPHATE 4 MG: 4 INJECTION INTRA-ARTICULAR; INTRALESIONAL; INTRAMUSCULAR; INTRAVENOUS; SOFT TISSUE at 04:02

## 2022-02-14 RX ADMIN — METHYLPREDNISOLONE ACETATE 80 MG: 80 INJECTION, SUSPENSION INTRA-ARTICULAR; INTRALESIONAL; INTRAMUSCULAR; SOFT TISSUE at 04:02

## 2022-02-14 ASSESSMENT — ROUTINE ASSESSMENT OF PATIENT INDEX DATA (RAPID3)
TOTAL RAPID3 SCORE: 6
MDHAQ FUNCTION SCORE: 1.2
PATIENT GLOBAL ASSESSMENT SCORE: 7.5
FATIGUE SCORE: 3.3
PSYCHOLOGICAL DISTRESS SCORE: 2.2
PAIN SCORE: 6.5

## 2022-02-14 NOTE — PROGRESS NOTES
Subjective:       Patient ID: Malu Holland is a 61 y.o. female.    Chief Complaint: Disease Management    Follow up: 62 yo female severe osteoarthritis cervical spine with spinal stenosis,from DM gastroparesis. ITP A positive rei 1: 80, spinal stenosis, she gained 12 lbs, she is drink 30 G protein she has severe vertigo and off balance.Patient complains of arthralgias and myalgias for which has been present for a few years. Pain is located in multiple joints, both shoulder(s), both elbow(s), both wrist(s), both MCP(s): 1st, 2nd, 3rd, 4th and 5th, both PIP(s): 1st, 2nd, 3rd, 4th and 5th, both DIP(s): 1st and 2nd, both hip(s), both knee(s) and both MTP(s): 1st, 2nd, 3rd, 4th and 5th, is described as aching, pulsating, shooting and throbbing, and is constant, moderate .  Associated symptoms include: crepitation, decreased range of motion, edema, effusion, tenderness and warmth. Her weight loss has stabilized.            She complains of joint swelling.         Review of Systems   Constitutional: Positive for activity change, appetite change and unexpected weight change. Negative for chills and diaphoresis.   HENT: Negative for congestion, dental problem, ear discharge, ear pain, facial swelling, mouth sores, nosebleeds, postnasal drip, rhinorrhea, sinus pressure, sneezing, sore throat, tinnitus and voice change.    Eyes: Negative for photophobia, pain, discharge, redness and itching.   Respiratory: Negative for apnea, cough, chest tightness, shortness of breath and wheezing.    Cardiovascular: Positive for leg swelling. Negative for chest pain and palpitations.   Gastrointestinal: Negative for abdominal distention, abdominal pain, constipation, diarrhea, nausea and vomiting.   Endocrine: Negative for cold intolerance, heat intolerance, polydipsia and polyuria.   Genitourinary: Negative for decreased urine volume, difficulty urinating, flank pain, frequency, hematuria and urgency.   Musculoskeletal: Positive for  "arthralgias, back pain, joint swelling, neck pain and neck stiffness.   Skin: Positive for rash. Negative for pallor and wound.   Allergic/Immunologic: Positive for immunocompromised state.   Neurological: Negative for dizziness, tremors, weakness and numbness.   Hematological: Negative for adenopathy. Bruises/bleeds easily.   Psychiatric/Behavioral: Negative for sleep disturbance. The patient is not nervous/anxious.          Objective:   /76   Pulse 75   Ht 5' 0.98" (1.549 m)   Wt 50.2 kg (110 lb 10.7 oz)   BMI 20.92 kg/m²      Physical Exam   Constitutional: She is oriented to person, place, and time. No distress.   HENT:   Head: Normocephalic and atraumatic.   Mouth/Throat: Oropharynx is clear and moist.   Eyes: Pupils are equal, round, and reactive to light.   Neck: No thyromegaly present.   Cardiovascular: Normal rate, regular rhythm and normal heart sounds. Exam reveals no gallop and no friction rub.   No murmur heard.  Pulmonary/Chest: Breath sounds normal. She has no wheezes. She has no rales. She exhibits no tenderness.   Abdominal: There is no abdominal tenderness. There is no rebound and no guarding.   Musculoskeletal:         General: Tenderness and deformity present.      Right shoulder: Tenderness present.      Left shoulder: Tenderness present.      Right elbow: Normal.      Left elbow: Tenderness present.      Right wrist: Tenderness present.      Left wrist: Tenderness present.      Cervical back: Neck supple.      Right knee: Effusion present. Tenderness present.      Left knee: Effusion present. Tenderness present.   Lymphadenopathy:     She has no cervical adenopathy.   Neurological: She is alert and oriented to person, place, and time. She displays weakness and atrophy.   Reflex Scores:       Brachioradialis reflexes are 1+ on the left side.       Patellar reflexes are 1+ on the right side and 1+ on the left side.  Skin: Rash noted. No erythema. No pallor.   Psychiatric: Mood, memory, " affect and judgment normal.   Vitals reviewed.      Right Side Rheumatological Exam     Examination finds the elbow normal.    The patient is tender to palpation of the shoulder, wrist, knee, 1st PIP, 1st MCP, 2nd PIP, 2nd MCP, 3rd PIP, 3rd MCP, 4th PIP, 4th MCP, 5th PIP and 5th MCP    Shoulder Exam   Tenderness Location: acromion    Range of Motion   Active abduction: abnormal   Adduction: abnormal  Sensation: normal    Knee Exam   Tenderness Location: medial joint line and LCL  Patellofemoral Crepitus: positive  Effusion: positive  Sensation: normal    Hip Exam   Tenderness Location: posterior and lateral  Sensation: normal    Elbow/Wrist Exam   Tenderness Location: no tenderness  Sensation: normal    Left Side Rheumatological Exam     The patient is tender to palpation of the shoulder, elbow, wrist, knee, 1st PIP, 1st MCP, 2nd PIP, 2nd MCP, 3rd PIP, 3rd MCP, 4th PIP, 4th MCP, 5th PIP and 5th MCP.    Shoulder Exam   Tenderness Location: acromion    Range of Motion   Active abduction: abnormal   Sensation: normal    Knee Exam   Tenderness Location: lateral joint line and medial joint line    Patellofemoral Crepitus: positive  Effusion: positive  Sensation: normal    Hip Exam   Tenderness Location: posterior and lateral  Sensation: normal    Elbow/Wrist Exam   Sensation: normal      Back/Neck Exam   General Inspection   Gait: abnormal       Tenderness Right paramedian tenderness of the Upper C-Spine, Upper T-Spine, Upper L-Spine and SI Joint.Left paramedian tenderness of the Upper C-Spine, Upper T-Spine, SI Joint and Upper L-Spine.    Back Range of Motion   Lateral Bend Right: abnormal  Lateral Bend Left: abnormal  Rotation Right: abnormal  Rotation Left: abnormal    Neck Range of Motion   Flexion: Severely limited  Extension: Severely limited  Right Lateral Bend: abnormal  Left Lateral Bend: abnormal  Right Rotation: abnormal  Left Rotation: abnormal    Back Tests   Right Side Tests    Squat Test: unable to perform    Left Side Tests    Squat: unable to perform           Results for orders placed or performed in visit on 02/08/22   Quantiferon Gold TB   Result Value Ref Range    NIL 0.020 See text IU/mL    TB1 - Nil 0.000 See text IU/mL    TB2 - Nil 0.000 See text IU/mL    Mitogen - Nil 6.820 See text IU/mL    TB Gold Plus Negative      Impression    Normal       Electronically Signed By: Norm Cobb MD 5/7/2019 13:47 CDT    Narrative    [facility] [place]     CLINICAL HISTORY:   DIAGNOSIS:E04.1   Thyroid nodule   REASON FOR STUDY:thyroid nodule   ADDITIONAL HISTORY: None.   PROVIDER COMMENTS:     TECHNIQUE:   T1 and T2-weighted and post IV contrast MR sequences were obtained of the brain     CONTRAST: 10 mL Magnevist IV     COMPARISON:None.     FINDINGS:     No abnormal intra-axial or extra-axial fluid collections. No mass, mass effect or edema. No diffusion restriction. No focal signal abnormalities. No abnormal contrast enhancement. Ventricles appear normal. Vascular flow voids are intact. No significant scalp or calvarial signal abnormalities.    Other Result Text    Maninder, Rad Results In - 05/07/2019  1:47 PM CDT   [facility] [place]     CLINICAL HISTORY:   DIAGNOSIS:E04.1   Thyroid nodule   REASON FOR STUDY:thyroid nodule   ADDITIONAL HISTORY: None.   PROVIDER COMMENTS:     TECHNIQUE:   T1 and T2-weighted and post IV contrast MR sequences were obtained of the brain     CONTRAST: 10 mL Magnevist IV     COMPARISON:None.     FINDINGS:     No abnormal intra-axial or extra-axial fluid collections. No mass, mass effect or edema. No diffusion restriction. No focal signal abnormalities. No abnormal contrast enhancement. Ventricles appear normal. Vascular flow voids are intact. No significant scalp or calvarial signal abnormalities.     IMPRESSION:   Normal       Electronically Signed By: Norm Cobb MD 5/7/2019 13:47 CDT  Specimen Collected: -- Last Resulted: --   Date: 05/07/19    Received From: [facility]         Status: Final result     Visible to patient: Yes (not seen)     Dx: Hashimoto's thyroiditis; Chronic pain...     0 Result Notes     Ref Range & Units 6 d ago   EBV VCA IgG <18.0 U/mL >750.0 High     Comment: INTERPRETATION:     Positive: IgG antibody detected which may   indicate current or previous exposure.    EBV VCA IgM <36.0 U/mL <10.0    Comment: INTERPRETATION:     Negative:  No antibody detected.   For clinical interpretation please refer to   the Immunology Viral Serology appendix in   the [facility] test catalog.     Test performed at [facility],   [address] [place]  [address]     [phone]   Teddy Rubio MD  - Medical Director    EBV Early Antigen Ab, IgG <9.0 U/mL 16.8 High     Comment: INTERPRETATION:     Positive: IgG antibody detected which may   indicate current or previous exposure.    EBV Nuclear Ag Ab <18.0 U/mL 134.0 High     Comment: INTERPRETATION:     Positive: IgG antibody detected which may   indicate current or previous exposure.    Resulting Agency  [facility]                             Assessment:       1. MCTD (mixed connective tissue disease)    2. Dizziness and giddiness    3. Other headache syndrome    4. Sjogren's syndrome, with unspecified organ involvement    5. Cervical spinal stenosis    6. Cervical arthritis with myelopathy    7. Hashimoto's thyroiditis    8. Spinal stenosis of lumbar region with neurogenic claudication    9. Ankylosing spondylitis, unspecified site of spine    10. Thrush of mouth and esophagus    11. Seronegative rheumatoid arthritis    12. PSA (psoriatic arthritis)    13. Chronic pain syndrome    14. GERD without esophagitis    15. BRENNON positive    16. Acute ITP            Plan:     Malu was seen today for disease management.    Diagnoses and all orders for this visit:    MCTD (mixed connective tissue disease)  -     ketorolac injection 60 mg  -     methylPREDNISolone acetate injection 80 mg  -     dexamethasone  injection 4 mg  -     cyanocobalamin injection 1,000 mcg  -     pregabalin (LYRICA) 25 MG capsule; Take 1 capsule (25 mg total) by mouth 3 (three) times daily.  -     CBC Auto Differential; Future  -     Comprehensive Metabolic Panel; Future  -     Sedimentation rate; Future  -     C-Reactive Protein; Future  -     Ambulatory referral/consult to Family Practice; Future    Dizziness and giddiness  -     MRI Brain Without Contrast; Future  -     Ambulatory referral/consult to Family Practice; Future    Other headache syndrome  -     MRI Brain Without Contrast; Future  -     ketorolac injection 60 mg  -     methylPREDNISolone acetate injection 80 mg  -     dexamethasone injection 4 mg  -     cyanocobalamin injection 1,000 mcg  -     Ambulatory referral/consult to Family Practice; Future    Sjogren's syndrome, with unspecified organ involvement  -     ketorolac injection 60 mg  -     methylPREDNISolone acetate injection 80 mg  -     dexamethasone injection 4 mg  -     cyanocobalamin injection 1,000 mcg  -     pregabalin (LYRICA) 25 MG capsule; Take 1 capsule (25 mg total) by mouth 3 (three) times daily.  -     HYDROcodone-acetaminophen (NORCO)  mg per tablet; Take 1 tablet by mouth every 8 (eight) hours as needed for Pain.  -     CBC Auto Differential; Future  -     Comprehensive Metabolic Panel; Future  -     Sedimentation rate; Future  -     C-Reactive Protein; Future  -     Ambulatory referral/consult to Family Practice; Future    Cervical spinal stenosis  -     pregabalin (LYRICA) 25 MG capsule; Take 1 capsule (25 mg total) by mouth 3 (three) times daily.  -     HYDROcodone-acetaminophen (NORCO)  mg per tablet; Take 1 tablet by mouth every 8 (eight) hours as needed for Pain.  -     CBC Auto Differential; Future  -     Comprehensive Metabolic Panel; Future  -     Sedimentation rate; Future  -     C-Reactive Protein; Future  -     Ambulatory referral/consult to Family Practice; Future    Cervical arthritis  with myelopathy  -     pregabalin (LYRICA) 25 MG capsule; Take 1 capsule (25 mg total) by mouth 3 (three) times daily.  -     CBC Auto Differential; Future  -     Comprehensive Metabolic Panel; Future  -     Sedimentation rate; Future  -     C-Reactive Protein; Future  -     Ambulatory referral/consult to Family Practice; Future    Hashimoto's thyroiditis  -     pregabalin (LYRICA) 25 MG capsule; Take 1 capsule (25 mg total) by mouth 3 (three) times daily.  -     CBC Auto Differential; Future  -     Comprehensive Metabolic Panel; Future  -     Sedimentation rate; Future  -     C-Reactive Protein; Future  -     Ambulatory referral/consult to Family Practice; Future    Spinal stenosis of lumbar region with neurogenic claudication  -     pregabalin (LYRICA) 25 MG capsule; Take 1 capsule (25 mg total) by mouth 3 (three) times daily.  -     HYDROcodone-acetaminophen (NORCO)  mg per tablet; Take 1 tablet by mouth every 8 (eight) hours as needed for Pain.  -     Ambulatory referral/consult to Family Practice; Future    Ankylosing spondylitis, unspecified site of spine  -     tofacitinib (XELJANZ XR) 11 mg Tb24; Take 1 tablet by mouth once daily.  -     ketorolac injection 60 mg  -     methylPREDNISolone acetate injection 80 mg  -     dexamethasone injection 4 mg  -     cyanocobalamin injection 1,000 mcg  -     pregabalin (LYRICA) 25 MG capsule; Take 1 capsule (25 mg total) by mouth 3 (three) times daily.  -     HYDROcodone-acetaminophen (NORCO)  mg per tablet; Take 1 tablet by mouth every 8 (eight) hours as needed for Pain.  -     CBC Auto Differential; Future  -     Comprehensive Metabolic Panel; Future  -     Sedimentation rate; Future  -     C-Reactive Protein; Future  -     Ambulatory referral/consult to Family Practice; Future    Thrush of mouth and esophagus  -     pregabalin (LYRICA) 25 MG capsule; Take 1 capsule (25 mg total) by mouth 3 (three) times daily.    Seronegative rheumatoid arthritis  -      tofacitinib (XELJANZ XR) 11 mg Tb24; Take 1 tablet by mouth once daily.  -     ketorolac injection 60 mg  -     methylPREDNISolone acetate injection 80 mg  -     dexamethasone injection 4 mg  -     cyanocobalamin injection 1,000 mcg    PSA (psoriatic arthritis)  -     tofacitinib (XELJANZ XR) 11 mg Tb24; Take 1 tablet by mouth once daily.  -     ketorolac injection 60 mg  -     methylPREDNISolone acetate injection 80 mg  -     dexamethasone injection 4 mg  -     cyanocobalamin injection 1,000 mcg    Chronic pain syndrome  -     pregabalin (LYRICA) 25 MG capsule; Take 1 capsule (25 mg total) by mouth 3 (three) times daily.  -     HYDROcodone-acetaminophen (NORCO)  mg per tablet; Take 1 tablet by mouth every 8 (eight) hours as needed for Pain.    GERD without esophagitis  -     pregabalin (LYRICA) 25 MG capsule; Take 1 capsule (25 mg total) by mouth 3 (three) times daily.    BRENNON positive  -     pregabalin (LYRICA) 25 MG capsule; Take 1 capsule (25 mg total) by mouth 3 (three) times daily.    Acute ITP  -     pregabalin (LYRICA) 25 MG capsule; Take 1 capsule (25 mg total) by mouth 3 (three) times daily.        1. On plaquenil may be making it worse  2. Start xeljanz  3 refer to family practice  4. I have  check [place] prescription monitoring program site and no unusual or abnormal behavior has occurred pt understand the risk and benefits of taking opioid medications and has decided to continue the  medication     More than 50% of the  40 minute encounter was spent face to face counseling the patient regarding current status and future plan of care as well as side of the medications. All questions were answered to patient's satisfaction

## 2022-02-14 NOTE — PROGRESS NOTES
Administered 1 cc ( 1000 mcg/ml ) of b12 to the right upper outer gluteal. Informed of s/s to report verbalized understanding. No adverse reactions noted.        Administered 1 cc dexamethasone 4mg/cc  to right upper outer gluteal. Pt tolerated well. No acute reaction noted to site. Pt instructed on S/S to report. Pt verbalized understanding.         Administered 1 cc ( 80 mg/ml ) of depomedrol to the right upper outer gluteal. Informed of s/s to report verbalized understanding. No adverse reactions noted.        Administered 2 cc ( 30 mg/ml ) of toradol to the right upper outer gluteal. Informed of s/s to report verbalized understanding. No adverse reactions noted.

## 2022-02-23 LAB
HLA B27 INTERPRETATION: NORMAL
HLA-B27 RELATED AG QL: NEGATIVE
HLA-B27 RELATED AG QL: NORMAL

## 2022-02-24 LAB
CELIA INTERPRETATION: NORMAL
CELIA TESTING DATE: NORMAL
HLA DQA1 1: NORMAL
HLA DQA1 2: NORMAL
HLA DRB4 1: NORMAL
HLA-DP 1 SERO. EQUIV: NORMAL
HLA-DP 2 SERO. EQUIV: NORMAL
HLA-DPA1 1: NORMAL
HLA-DPA1 2: NORMAL
HLA-DPB1 1: NORMAL
HLA-DPB1 2: NORMAL
HLA-DQ 1 SERO. EQUIV: 2
HLA-DQ 2 SERO. EQUIV: 7
HLA-DQB1 1: NORMAL
HLA-DQB1 2: NORMAL
HLA-DRB1 1 SERO. EQUIV: NORMAL
HLA-DRB1 1: NORMAL
HLA-DRB1 2 SERO. EQUIV: NORMAL
HLA-DRB1 2: NORMAL
HLA-DRB3 1: NORMAL
HLA-DRB3 2: NORMAL
HLA-DRB345 1 SERO. EQUIV: NORMAL
HLA-DRB345 2 SERO. EQUIV: NORMAL
HLA-DRB4 2: NORMAL
HLA-DRB5 1: NORMAL
HLA-DRB5 2: NORMAL

## 2022-03-08 DIAGNOSIS — B37.81 THRUSH OF MOUTH AND ESOPHAGUS: ICD-10-CM

## 2022-03-08 DIAGNOSIS — B37.0 THRUSH OF MOUTH AND ESOPHAGUS: ICD-10-CM

## 2022-03-08 DIAGNOSIS — E06.3 HASHIMOTO'S THYROIDITIS: ICD-10-CM

## 2022-03-08 DIAGNOSIS — K21.9 GERD WITHOUT ESOPHAGITIS: ICD-10-CM

## 2022-03-08 DIAGNOSIS — F41.9 ANXIETY: Primary | ICD-10-CM

## 2022-03-08 DIAGNOSIS — M35.1 MCTD (MIXED CONNECTIVE TISSUE DISEASE): ICD-10-CM

## 2022-03-08 DIAGNOSIS — M35.00 SJOGREN'S SYNDROME, WITH UNSPECIFIED ORGAN INVOLVEMENT: ICD-10-CM

## 2022-03-08 NOTE — TELEPHONE ENCOUNTER
----- Message from Zion MARQUES Kerrylacy sent at 3/7/2022  8:09 AM CST -----  Contact: patient  Type:  RX Refill Request    Who Called:  patient  Refill or New Rx:  new    paroxetine (PAXIL) 10 MG tablet 30 tablet 2 1/21/2022  No  Sig: TAKE ONE TABLET BY MOUTH IN THE EVENING.  Sent to pharmacy as: paroxetine (PAXIL) 10 MG tablet    Preferred Pharmacy with phone number:    [facility]  [address] [address]  Phone number: [phone]    Ordering Provider:  Conner Pickering Call Back Number:  [phone]  Additional Information:  Patient called in and stated she was given samples of Xeljanz 11mg but has not started it because she has not heard back from office to see if her insurance was going to cover it or not.

## 2022-03-09 RX ORDER — PAROXETINE 10 MG/1
10 TABLET, FILM COATED ORAL NIGHTLY
Qty: 30 TABLET | Refills: 2 | Status: SHIPPED | OUTPATIENT
Start: 2022-03-09 | End: 2022-03-23 | Stop reason: SDUPTHER

## 2022-03-09 RX ORDER — DICYCLOMINE HYDROCHLORIDE 20 MG/1
20 TABLET ORAL
Qty: 120 TABLET | Refills: 3 | Status: SHIPPED | OUTPATIENT
Start: 2022-03-09 | End: 2022-03-23 | Stop reason: SDUPTHER

## 2022-03-09 NOTE — TELEPHONE ENCOUNTER
Pharmacy requesting refill on Dicyclomine 20mg  Pt's LOV 02/14/2022  Pt's NOV 08/30/2022  Medication pending

## 2022-03-10 ENCOUNTER — PATIENT MESSAGE (OUTPATIENT)
Dept: RHEUMATOLOGY | Facility: CLINIC | Age: 62
End: 2022-03-10
Payer: MEDICAID

## 2022-03-10 ENCOUNTER — SPECIALTY PHARMACY (OUTPATIENT)
Dept: PHARMACY | Facility: CLINIC | Age: 62
End: 2022-03-10
Payer: MEDICAID

## 2022-03-15 NOTE — TELEPHONE ENCOUNTER
DOCUMENTATION ONLY:  Prior authorization for Xeljanz approved from 3/11/2022 to 9/10/2022    Case Id: [id number]    Co-pay: 0.00    BI complete, medicaid. FA not required.

## 2022-03-21 ENCOUNTER — SPECIALTY PHARMACY (OUTPATIENT)
Dept: PHARMACY | Facility: CLINIC | Age: 62
End: 2022-03-21
Payer: MEDICAID

## 2022-03-21 DIAGNOSIS — M06.00 SERONEGATIVE RHEUMATOID ARTHRITIS: Primary | ICD-10-CM

## 2022-03-21 NOTE — TELEPHONE ENCOUNTER
Specialty Pharmacy - Initial Clinical Assessment    Specialty Medication Orders Linked to Encounter    Flowsheet Row Most Recent Value   Medication #1 tofacitinib (XELJANZ XR) 11 mg Tb24 (Order#[id number], Rx#[id number])        Patient Diagnosis   M06.00 - Seronegative rheumatoid arthritis    Subjective    Malu Holland is a 62 y.o. female, who is followed by the specialty pharmacy service for management and education.    Recent Encounters     Date Type Provider Description    03/21/2022 Specialty Pharmacy Zaida Toledo PharmD Initial Clinical Assessment    03/10/2022 Specialty Pharmacy Angelina Clement PharmD Referral Authorization        Clinical call attempts since last clinical assessment   3/21/2022  9:00 PM - Specialty Pharmacy - Clinical Assessment by Angelina Clement PharmD     Current Outpatient Medications   Medication Sig    aloe vera 5,000 mg Cap Take by mouth.    aspirin (ECOTRIN) 81 MG EC tablet Take 81 mg by mouth once daily.     aspirin 325 MG tablet Take 325 mg by mouth.    buPROPion (WELLBUTRIN SR) 150 MG TBSR 12 hr tablet Take 150 mg by mouth 2 (two) times daily.    calcium carbonate (OS-LUCIANO) 600 mg calcium (1,500 mg) Tab Take 600 mg by mouth once daily.    cetirizine (ZYRTEC) 10 MG tablet Take 10 mg by mouth daily as needed.    cholecalciferol, vitamin D3, 125 mcg (5,000 unit) Tab Take by mouth.    cyclobenzaprine (FLEXERIL) 10 MG tablet Take 1 tablet (10 mg total) by mouth 3 (three) times daily as needed for Muscle spasms.    cycloSPORINE (RESTASIS) 0.05 % ophthalmic emulsion     diclofenac sodium (VOLTAREN) 1 % Gel Apply 2 g topically.    dicyclomine (BENTYL) 20 mg tablet Take 1 tablet (20 mg total) by mouth every 6 to 8 hours as needed.    docusate sodium (COLACE) 100 MG capsule Take 100 mg by mouth.    fluconazole (DIFLUCAN) 150 MG Tab Take 150 mg by mouth once.    folic acid-vit B6-vit B12 (FOLBEE) 2.5-25-1 mg Tab Take 1 tablet by mouth once daily.    glipiZIDE (GLUCOTROL)  5 MG tablet Take 5 mg by mouth.    guaiFENesin (MUCINEX) 600 mg 12 hr tablet Take 1,200 mg by mouth.    lisinopril-hydrochlorothiazide (PRINZIDE,ZESTORETIC) 10-12.5 mg per tablet Take 1 tablet by mouth.    LORazepam (ATIVAN) 0.5 MG tablet Take 0.5 mg by mouth.    miscellaneous medical supply (C-TUB) Misc Glucometer and strips#100    multivitamin (THERAGRAN) per tablet Take 1 tablet by mouth.    nystatin (MYCOSTATIN) 100,000 unit/mL suspension     ondansetron (ZOFRAN-ODT) 4 MG TbDL Take 4 mg by mouth every 8 (eight) hours as needed.     pantoprazole (PROTONIX) 40 MG tablet Take 1 tablet (40 mg total) by mouth once daily.    paroxetine (PAXIL) 10 MG tablet Take 1 tablet (10 mg total) by mouth every evening.    polyethylene glycol (GLYCOLAX) 17 gram/dose powder Take 17 g by mouth.    pravastatin (PRAVACHOL) 40 MG tablet Take 40 mg by mouth.    predniSONE (DELTASONE) 1 MG tablet TAKE 3 TABLETS BY MOUTH ONCE DAILY.    pregabalin (LYRICA) 25 MG capsule Take 1 capsule (25 mg total) by mouth 3 (three) times daily.    promethazine (PHENERGAN) 12.5 MG Tab     tofacitinib (XELJANZ XR) 11 mg Tb24 Take 1 tablet by mouth once daily.    traZODone (DESYREL) 100 MG tablet TAKE 1 TABLET BY MOUTH NIGHTLY    triamcinolone acetonide 0.1% (KENALOG) 0.1 % ointment Apply topically 2 (two) times daily.    valACYclovir (VALTREX) 1000 MG tablet TAKE 1 TABLET BY MOUTH THREE TIMES DAILY    zinc gluconate 50 mg tablet Take 50 mg by mouth.   Last reviewed on 3/21/2022  5:16 PM by Zaida Toledo, PharmD    Review of patient's allergies indicates:   Allergen Reactions    Cymbalta [duloxetine] Other (See Comments)     Altered mental status   Last reviewed on  2/14/2022 4:03 PM by Roshan Prieto          Assessment Questions - Documented Responses    Flowsheet Row Most Recent Value   Assessment    Medication Reconciliation completed for patient Yes   During the past 4 weeks, has patient missed any activities due to condition or  "medication? No   During the past 4 weeks, did patient have any of the following urgent care visits? None   Goals of Therapy Status Discussed (new start)   Status of the patients ability to self-administer: Is Able   All education points have been covered with patient? Yes, supplemental printed education provided   Welcome packet contents reviewed and discussed with patient? Yes   Assesment completed? Yes   Plan Therapy being initiated   Do you need to open a clinical intervention (i-vent)? No   Do you want to schedule first shipment? Yes   Medication #1 Assessment Info    Patient status New medication, New to [facility]   Is this medication effective? Not yet started        Refill Questions - Documented Responses    Flowsheet Row Most Recent Value   Patient Availability and HIPAA Verification    Does patient want to proceed with activity? Yes   HIPAA/medical authority confirmed? Yes   Relationship to patient of person spoken to? Self   Refill Screening Questions    When does the patient need to receive the medication? 03/23/22   Refill Delivery Questions    How will the patient receive the medication? Delivery Zenia   When does the patient need to receive the medication? 03/23/22   Shipping Address Home   Address in [facility] confirmed and updated if neccessary? Yes   Expected Copay ($) 0   Is the patient able to afford the medication copay? Yes   Payment Method zero copay   Days supply of Refill 30   Supplies needed? No supplies needed   Refill activity completed? Yes   Refill activity plan Refill scheduled   Shipment/Pickup Date: 03/23/22          Objective    She has a past medical history of Acid reflux, Diabetes mellitus, Dry mouth, and Hypertension.    BP Readings from Last 4 Encounters:   02/14/22 135/76   07/12/21 131/77   02/12/21 124/74   02/14/20 130/76     Ht Readings from Last 4 Encounters:   02/14/22 5' 0.98" (1.549 m)   07/12/21 5' 1" (1.549 m)   02/12/21 5' 1" (1.549 m)   07/21/20 5' 1" (1.549 m) "     Wt Readings from Last 4 Encounters:   02/14/22 50.2 kg (110 lb 10.7 oz)   07/12/21 51.7 kg (114 lb)   02/12/21 52 kg (114 lb 10.2 oz)   07/21/20 50.8 kg (112 lb)     Recent Labs   Lab Result Units 02/08/22  1106   Hepatitis B Surface Ag  Negative   Hep B S Ab  Negative     The goals of rheumatoid arthritis treatment include:  · Relieving pain and suppressing inflammation  · Achieving remission and preventing joint and organ damage  · Increasing joint mobility and strength  · Preventing infection and other complications of treatment  · Reducing long term complications of rheumatoid arthritis  · Improving or maintaining physical function and optimal well-being  · Improving or maintaining quality of life  · Maintaining optimal therapy adherence  · Minimizing and managing side effects    Goals of Therapy Status: Discussed (new start)    Assessment/Plan  Patient plans to hold therapy awaiting provider response    Interventions added this encounter   Open: [facility] Provider Intervention - Drug therapy appropriateness: tofacitinib (XELJANZ XR) 11 mg Tb24     Indication, dosage, appropriateness, effectiveness, safety and convenience of her specialty medication(s) were reviewed today.     Patient Education   Patient received education on the following:    Expectations and possible outcomes of therapy   Proper use, timely administration, and missed dose management   Duration of therapy   Side effects, including prevention, minimization, and management   Contraindications and safety precautions   New or changed medications, including prescribe and over the counter medications and supplements   Reviews recommended vaccinations, as appropriate   Storage, safe handling, and disposal    Xeljanz initial consult completed. Pt has received samples from [facility], but she did not yet start therapy. Pt will hold off on starting until MD confirms ok to start. Pt still wanted to proceed with consult/delivery at this time and will hold  off on starting until she receives call back. Pt stated she had 3 episodes of diverticulitis before and a surgeon in [place] removed 12 inches of her colon. Messaged MD and LANETTE to confirm if pt is to still proceed with starting Xeljanz therapy given its risk of GI perforation. Will reach out to pt once MD advises.    Infection precautions reviewed with pt. Counseled pt to hold dose if ill. Dosage, storage, and administration reviewed with pt. How to handle missed doses reviewed. Pt voiced understanding. Common side effects reviewed with pt. Serious side effects to report to MD reviewed with pt. Warnings/precautions discussed - risk of thrombosis, risk of GI perforation, malignancy, risk of serious infection, signs/symptoms of liver abnormalities. Reiterated importance of keeping lab appts. Pt verbalized understanding. [facility] refill process and services explained to pt. [facility] will call in 3 weeks for refill. Pt had no further questions or concerns and was encouraged to call [facility] should any arise.        Tasks added this encounter   4/15/2022 - Refill Call (Auto Added)  12/21/2022 - Clinical - Follow Up Assesement (Annual)   Tasks due within next 3 months   No tasks due.     Zaida Toledo, PharmD  [facility]  [address]  [facility] [address]  Phone: [phone]  Fax: [phone]

## 2022-03-23 DIAGNOSIS — K21.9 GERD WITHOUT ESOPHAGITIS: ICD-10-CM

## 2022-03-23 DIAGNOSIS — F41.9 ANXIETY: ICD-10-CM

## 2022-03-23 DIAGNOSIS — M06.00 SERONEGATIVE RHEUMATOID ARTHRITIS: Primary | ICD-10-CM

## 2022-03-23 RX ORDER — PAROXETINE 10 MG/1
10 TABLET, FILM COATED ORAL NIGHTLY
Qty: 30 TABLET | Refills: 3 | Status: SHIPPED | OUTPATIENT
Start: 2022-03-23 | End: 2022-04-18 | Stop reason: SDUPTHER

## 2022-03-23 RX ORDER — DICYCLOMINE HYDROCHLORIDE 20 MG/1
20 TABLET ORAL
Qty: 120 TABLET | Refills: 3 | Status: CANCELLED | OUTPATIENT
Start: 2022-03-23

## 2022-03-23 RX ORDER — PAROXETINE 10 MG/1
10 TABLET, FILM COATED ORAL NIGHTLY
Qty: 30 TABLET | Refills: 2 | Status: CANCELLED | OUTPATIENT
Start: 2022-03-23

## 2022-03-23 RX ORDER — DICYCLOMINE HYDROCHLORIDE 20 MG/1
20 TABLET ORAL
Qty: 120 TABLET | Refills: 3 | Status: SHIPPED | OUTPATIENT
Start: 2022-03-23 | End: 2022-04-18 | Stop reason: SDUPTHER

## 2022-03-23 RX ORDER — ABATACEPT 125 MG/ML
125 INJECTION, SOLUTION SUBCUTANEOUS
Qty: 4 ML | Refills: 6 | Status: SHIPPED | OUTPATIENT
Start: 2022-03-23 | End: 2022-10-14 | Stop reason: SDUPTHER

## 2022-03-23 NOTE — TELEPHONE ENCOUNTER
----- Message from Zaida Toledo PharmD sent at 3/23/2022  9:35 AM CDT -----  Regarding: FW: Xeljanz therapy  Ms. Holland has decided not to start Xeljanz due to hesitancy given her extensive history of diverticulitis and colon surgery. I discussed with her that Orencia may be a good option for her since she is [facility]ENNON+ and has hx of diverticulitis. She does not want to wait until next appt in August in order to start something for her Rheumatoid Arthritis. She is waiting on callback regarding switching to another therapy. Please advise.     ----- Message -----  From: Zaida Toledo PharmD  Sent: 3/21/2022   5:01 PM CDT  To: Roshan Prieto MD, Kiki Wall PA-C, #  Subject: Xeljanz therapy                                  Goof afternoon,    Ms. Holland called the pharmacy today regarding her Xeljanz. She has not yet started the samples, but she did say she had 3 episodes of diverticulitis before and a surgeon in [place] removed 12 inches of her colon. I just wanted to confirm if you would still like her to proceed with starting Xeljanz therapy? Patient is awaiting a callback regarding possible start date or if she is to switch to another therapy.    Thank you,  Zaida Toledo, PharmD, CSP  Clinical Pharmacist - Rheum/Derm/GI  [facility]  Ph: [phone]

## 2022-03-23 NOTE — TELEPHONE ENCOUNTER
Incoming call from patient requesting to cancel her Xeljanz delivery as she does not want to proceed with the therapy, in light of her diverticulitus. Pt reports she often runs a low grade fever and feels if she needs to hold med when ill she has concerns [facility] cannot cancel the order as it has already left the pharmacy for delivery. Pt agreed to receive the delivery but will not start the med. Instructed patient to further reach out to her doctor to discuss care plan as she expressed desire to return to her previous therapy. Notified MDO and assigned PharmD via cc chart message for follow-up.

## 2022-03-23 NOTE — TELEPHONE ENCOUNTER
----- Message from Brenda Arreguin sent at 3/23/2022  9:24 AM CDT -----  Contact: Pt  Type:  RX Refill Request    Who Called:  Pt    Refill or New Rx:  Refill    RX Name and Strength:   paroxetine (PAXIL) 10 MG tablet  dicyclomine (BENTYL) 20 mg tablet    Preferred Pharmacy with phone number:    [facility] [address] [facility]  [address] [address] [address]  Phone: [phone] Fax: [phone]    Best Call Back Number:  [phone]    Additional Information:  PLEASE NOTE: Different pharmacy.  The meds were called into her old pharmacy which is now closed.  Please call pt back.  Thanks.

## 2022-03-24 ENCOUNTER — SPECIALTY PHARMACY (OUTPATIENT)
Dept: PHARMACY | Facility: CLINIC | Age: 62
End: 2022-03-24
Payer: MEDICAID

## 2022-03-24 NOTE — TELEPHONE ENCOUNTER
Provider switched patient to Orencia due to hesitancy to start Xeljanz. Patient informed.   PA submitted via [facility]  (Key: [id number])

## 2022-03-28 NOTE — TELEPHONE ENCOUNTER
PA denied because patient has not tried Enbrel or Humira. Faxed documentation with [employer] for reconsideration to [phone].

## 2022-03-29 NOTE — TELEPHONE ENCOUNTER
DOCUMENTATION ONLY:  Prior authorization for Orencia approved from 3/28/22 to 9/28/22    Case Id: n/a    Co-pay: 0.00    BI complete. FA not required. Pushing to initial consult.

## 2022-03-30 ENCOUNTER — SPECIALTY PHARMACY (OUTPATIENT)
Dept: PHARMACY | Facility: CLINIC | Age: 62
End: 2022-03-30
Payer: MEDICAID

## 2022-03-30 DIAGNOSIS — M06.00 SERONEGATIVE RHEUMATOID ARTHRITIS: Primary | ICD-10-CM

## 2022-03-30 NOTE — TELEPHONE ENCOUNTER
Specialty Pharmacy - Initial Clinical Assessment    Specialty Medication Orders Linked to Encounter    Flowsheet Row Most Recent Value   Medication #1 abatacept (ORENCIA CLICKJECT) 125 mg/mL AtIn (Order#[id number], Rx#[id number])        Patient Diagnosis   M06.00 - Seronegative rheumatoid arthritis    Subjective    Malu Holland is a 62 y.o. female, who is followed by the specialty pharmacy service for management and education.    Recent Encounters     Date Type Provider Description    03/30/2022 Specialty Pharmacy Angelina Clement PharmD Initial Clinical Assessment    03/24/2022 Specialty Pharmacy Angelina Clement PharmD Referral Authorization    03/21/2022 Specialty Pharmacy Zaida Toledo, Igor Initial Clinical Assessment    03/10/2022 Specialty Pharmacy Angelina Clement PharmD Referral Authorization        Clinical call attempts since last clinical assessment   3/21/2022  9:00 PM - Specialty Pharmacy - Clinical Assessment by Angelina Clement PharmD     Current Outpatient Medications   Medication Sig    abatacept (ORENCIA CLICKJECT) 125 mg/mL AtIn Inject 125 mg into the skin every 7 days.    aloe vera 5,000 mg Cap Take by mouth.    aspirin (ECOTRIN) 81 MG EC tablet Take 81 mg by mouth once daily.     aspirin 325 MG tablet Take 325 mg by mouth.    buPROPion (WELLBUTRIN SR) 150 MG TBSR 12 hr tablet Take 150 mg by mouth 2 (two) times daily.    calcium carbonate (OS-LUCIANO) 600 mg calcium (1,500 mg) Tab Take 600 mg by mouth once daily.    cetirizine (ZYRTEC) 10 MG tablet Take 10 mg by mouth daily as needed.    cholecalciferol, vitamin D3, 125 mcg (5,000 unit) Tab Take by mouth.    cyclobenzaprine (FLEXERIL) 10 MG tablet Take 1 tablet (10 mg total) by mouth 3 (three) times daily as needed for Muscle spasms.    cycloSPORINE (RESTASIS) 0.05 % ophthalmic emulsion     diclofenac sodium (VOLTAREN) 1 % Gel Apply 2 g topically.    dicyclomine (BENTYL) 20 mg tablet Take 1 tablet (20 mg total) by mouth every 6  to 8 hours as needed.    docusate sodium (COLACE) 100 MG capsule Take 100 mg by mouth.    fluconazole (DIFLUCAN) 150 MG Tab Take 150 mg by mouth once.    folic acid-vit B6-vit B12 (FOLBEE) 2.5-25-1 mg Tab Take 1 tablet by mouth once daily.    glipiZIDE (GLUCOTROL) 5 MG tablet Take 5 mg by mouth.    guaiFENesin (MUCINEX) 600 mg 12 hr tablet Take 1,200 mg by mouth.    lisinopril-hydrochlorothiazide (PRINZIDE,ZESTORETIC) 10-12.5 mg per tablet Take 1 tablet by mouth.    LORazepam (ATIVAN) 0.5 MG tablet Take 0.5 mg by mouth.    miscellaneous medical supply (C-TUB) Misc Glucometer and strips#100    multivitamin (THERAGRAN) per tablet Take 1 tablet by mouth.    nystatin (MYCOSTATIN) 100,000 unit/mL suspension     ondansetron (ZOFRAN-ODT) 4 MG TbDL Take 4 mg by mouth every 8 (eight) hours as needed.     pantoprazole (PROTONIX) 40 MG tablet Take 1 tablet (40 mg total) by mouth once daily.    paroxetine (PAXIL) 10 MG tablet Take 1 tablet (10 mg total) by mouth every evening.    polyethylene glycol (GLYCOLAX) 17 gram/dose powder Take 17 g by mouth.    pravastatin (PRAVACHOL) 40 MG tablet Take 40 mg by mouth.    predniSONE (DELTASONE) 1 MG tablet TAKE 3 TABLETS BY MOUTH ONCE DAILY.    pregabalin (LYRICA) 25 MG capsule Take 1 capsule (25 mg total) by mouth 3 (three) times daily.    promethazine (PHENERGAN) 12.5 MG Tab     traZODone (DESYREL) 100 MG tablet TAKE 1 TABLET BY MOUTH NIGHTLY    triamcinolone acetonide 0.1% (KENALOG) 0.1 % ointment Apply topically 2 (two) times daily.    valACYclovir (VALTREX) 1000 MG tablet TAKE 1 TABLET BY MOUTH THREE TIMES DAILY    zinc gluconate 50 mg tablet Take 50 mg by mouth.   Last reviewed on 3/21/2022  5:16 PM by Zaida Toledo, Igor    Review of patient's allergies indicates:   Allergen Reactions    Cymbalta [duloxetine] Other (See Comments)     Altered mental status   Last reviewed on  2/14/2022 4:03 PM by Roshan Prieto    Drug Interactions    Clinically relevant  drug interactions identified: no           Assessment Questions - Documented Responses    Flowsheet Row Most Recent Value   Assessment    Medication Reconciliation completed for patient Yes   During the past 4 weeks, has patient missed any activities due to condition or medication? No   During the past 4 weeks, did patient have any of the following urgent care visits? None   Goals of Therapy Status Discussed (new start)   Status of the patients ability to self-administer: Is Able   All education points have been covered with patient? Yes, supplemental printed education provided   Welcome packet contents reviewed and discussed with patient? Yes   Assesment completed? No   Plan Therapy being initiated   Do you need to open a clinical intervention (i-vent)? No   Do you want to schedule first shipment? Yes   Medication #1 Assessment Info    Patient status New medication, Exisiting to [facility]   Is this medication appropriate for the patient? Yes   Is this medication effective? Not yet started        Refill Questions - Documented Responses    Flowsheet Row Most Recent Value   Patient Availability and HIPAA Verification    Does patient want to proceed with activity? Yes   HIPAA/medical authority confirmed? Yes   Relationship to patient of person spoken to? Self   Refill Screening Questions    When does the patient need to receive the medication? 03/31/22   Refill Delivery Questions    How will the patient receive the medication? Delivery Zenia   When does the patient need to receive the medication? 03/31/22   Shipping Address Home   Address in [facility] confirmed and updated if neccessary? Yes   Expected Copay ($) 0   Is the patient able to afford the medication copay? Yes   Payment Method zero copay   Days supply of Refill 28   Supplies needed? No supplies needed   Refill activity completed? Yes   Refill activity plan Refill scheduled   Shipment/Pickup Date: 03/31/22          Objective    She has a past medical  "history of Acid reflux, Diabetes mellitus, Dry mouth, and Hypertension.    Tried/failed medications: plaquenil    BP Readings from Last 4 Encounters:   02/14/22 135/76   07/12/21 131/77   02/12/21 124/74   02/14/20 130/76     Ht Readings from Last 4 Encounters:   02/14/22 5' 0.98" (1.549 m)   07/12/21 5' 1" (1.549 m)   02/12/21 5' 1" (1.549 m)   07/21/20 5' 1" (1.549 m)     Wt Readings from Last 4 Encounters:   02/14/22 50.2 kg (110 lb 10.7 oz)   07/12/21 51.7 kg (114 lb)   02/12/21 52 kg (114 lb 10.2 oz)   07/21/20 50.8 kg (112 lb)     Recent Labs   Lab Result Units 02/08/22  1106   Hepatitis B Surface Ag  Negative   Hep B S Ab  Negative     The goals of rheumatoid arthritis treatment include:  · Relieving pain and suppressing inflammation  · Achieving remission and preventing joint and organ damage  · Increasing joint mobility and strength  · Preventing infection and other complications of treatment  · Reducing long term complications of rheumatoid arthritis  · Improving or maintaining physical function and optimal well-being  · Improving or maintaining quality of life  · Maintaining optimal therapy adherence  · Minimizing and managing side effects    Goals of Therapy Status: Discussed (new start)    Assessment/Plan  Patient plans to start therapy on 03/31/22      Indication, dosage, appropriateness, effectiveness, safety and convenience of her specialty medication(s) were reviewed today.     Patient Education   Patient received education on the following:    Expectations and possible outcomes of therapy   Proper use, timely administration, and missed dose management   Duration of therapy   Side effects, including prevention, minimization, and management   Contraindications and safety precautions   New or changed medications, including prescribe and over the counter medications and supplements   Reviews recommended vaccinations, as appropriate   Storage, safe handling, and disposal        Tasks added this " encounter   4/21/2022 - Refill Call (Auto Added)  12/30/2022 - Clinical - Follow Up Assesement (Annual)   Tasks due within next 3 months   No tasks due.     Angelina Clement, PharmD  [facility]  [address]  [facility] [address]  Phone: [phone]  Fax: [phone]

## 2022-04-18 ENCOUNTER — PATIENT MESSAGE (OUTPATIENT)
Dept: ADMINISTRATIVE | Facility: OTHER | Age: 62
End: 2022-04-18
Payer: MEDICAID

## 2022-04-23 ENCOUNTER — SPECIALTY PHARMACY (OUTPATIENT)
Dept: PHARMACY | Facility: CLINIC | Age: 62
End: 2022-04-23
Payer: MEDICAID

## 2022-04-23 NOTE — TELEPHONE ENCOUNTER
Specialty Pharmacy - Refill Coordination    Specialty Medication Orders Linked to Encounter    Flowsheet Row Most Recent Value   Medication #1 abatacept (ORENCIA CLICKJECT) 125 mg/mL AtIn (Order#[id number], Rx#[id number])          Refill Questions - Documented Responses    Flowsheet Row Most Recent Value   Patient Availability and HIPAA Verification    Does patient want to proceed with activity? Yes   HIPAA/medical authority confirmed? Yes   Relationship to patient of person spoken to? Self   Refill Screening Questions    Changes to allergies? No   Changes to medications? No   New conditions since last clinic visit? No   Unplanned office visit, urgent care, ED, or hospital admission in the last 4 weeks? No   How does patient/caregiver feel medication is working? Good   Financial problems or insurance changes? No   How many doses of your specialty medications were missed in the last 4 weeks? 0   Would patient like to speak to a pharmacist? No   When does the patient need to receive the medication? 04/28/22   Refill Delivery Questions    How will the patient receive the medication? Delivery Zenia   When does the patient need to receive the medication? 04/28/22   Shipping Address Home   Address in [facility] confirmed and updated if neccessary? Yes   Expected Copay ($) 0   Is the patient able to afford the medication copay? No   Payment Method zero copay   Days supply of Refill 28   Supplies needed? No supplies needed   Refill activity completed? Yes   Refill activity plan Refill scheduled   Shipment/Pickup Date: 04/26/22          Current Outpatient Medications   Medication Sig    abatacept (ORENCIA CLICKJECT) 125 mg/mL AtIn Inject 125 mg into the skin every 7 days.    aloe vera 5,000 mg Cap Take by mouth.    aspirin (ECOTRIN) 81 MG EC tablet Take 81 mg by mouth once daily.     aspirin 325 MG tablet Take 325 mg by mouth.    buPROPion (WELLBUTRIN SR) 150 MG TBSR 12 hr tablet Take 150 mg by mouth 2 (two) times  daily.    calcium carbonate (OS-LUCIANO) 600 mg calcium (1,500 mg) Tab Take 600 mg by mouth once daily.    cetirizine (ZYRTEC) 10 MG tablet Take 10 mg by mouth daily as needed.    cholecalciferol, vitamin D3, 125 mcg (5,000 unit) Tab Take by mouth.    cyclobenzaprine (FLEXERIL) 10 MG tablet Take 1 tablet (10 mg total) by mouth 3 (three) times daily as needed for Muscle spasms.    cycloSPORINE (RESTASIS) 0.05 % ophthalmic emulsion     diclofenac sodium (VOLTAREN) 1 % Gel Apply 2 g topically.    dicyclomine (BENTYL) 20 mg tablet Take 1 tablet (20 mg total) by mouth every 6 to 8 hours as needed.    dicyclomine (BENTYL) 20 mg tablet Take 1 tablet (20 mg total) by mouth every 6 (six) hours.    docusate sodium (COLACE) 100 MG capsule Take 100 mg by mouth.    fluconazole (DIFLUCAN) 150 MG Tab Take 150 mg by mouth once.    folic acid-vit B6-vit B12 (FOLBEE) 2.5-25-1 mg Tab Take 1 tablet by mouth once daily.    glipiZIDE (GLUCOTROL) 5 MG tablet Take 5 mg by mouth.    guaiFENesin (MUCINEX) 600 mg 12 hr tablet Take 1,200 mg by mouth.    lisinopril-hydrochlorothiazide (PRINZIDE,ZESTORETIC) 10-12.5 mg per tablet Take 1 tablet by mouth.    LORazepam (ATIVAN) 0.5 MG tablet Take 0.5 mg by mouth.    miscellaneous medical supply (C-TUB) Misc Glucometer and strips#100    multivitamin (THERAGRAN) per tablet Take 1 tablet by mouth.    nystatin (MYCOSTATIN) 100,000 unit/mL suspension     ondansetron (ZOFRAN-ODT) 4 MG TbDL Take 4 mg by mouth every 8 (eight) hours as needed.     pantoprazole (PROTONIX) 40 MG tablet Take 1 tablet (40 mg total) by mouth once daily.    paroxetine (PAXIL) 10 MG tablet Take 1 tablet (10 mg total) by mouth every evening.    polyethylene glycol (GLYCOLAX) 17 gram/dose powder Take 17 g by mouth.    pravastatin (PRAVACHOL) 40 MG tablet Take 40 mg by mouth.    predniSONE (DELTASONE) 1 MG tablet TAKE 3 TABLETS BY MOUTH ONCE DAILY.    pregabalin (LYRICA) 25 MG capsule Take 1 capsule (25 mg total)  by mouth 3 (three) times daily.    promethazine (PHENERGAN) 12.5 MG Tab     traZODone (DESYREL) 100 MG tablet TAKE 1 TABLET BY MOUTH NIGHTLY    triamcinolone acetonide 0.1% (KENALOG) 0.1 % ointment Apply topically 2 (two) times daily.    valACYclovir (VALTREX) 1000 MG tablet TAKE 1 TABLET BY MOUTH THREE TIMES DAILY    zinc gluconate 50 mg tablet Take 50 mg by mouth.   Last reviewed on 3/21/2022  5:16 PM by Zaida Toledo, PharmD    Review of patient's allergies indicates:   Allergen Reactions    Cymbalta [duloxetine] Other (See Comments)     Altered mental status    Last reviewed on  4/18/2022 11:22 AM by Sharyn Hassan      Tasks added this encounter   4/23/2022 - Referral Authorization - Refill Call   Tasks due within next 3 months   4/23/2022 - Refill Call (Auto Added)     [facility]  [address] [place] [id number]  Phone: [phone]  Fax: [phone]

## 2022-05-19 ENCOUNTER — SPECIALTY PHARMACY (OUTPATIENT)
Dept: PHARMACY | Facility: CLINIC | Age: 62
End: 2022-05-19
Payer: MEDICAID

## 2022-05-19 NOTE — TELEPHONE ENCOUNTER
Specialty Pharmacy - Refill Coordination    Specialty Medication Orders Linked to Encounter    Flowsheet Row Most Recent Value   Medication #1 abatacept (ORENCIA CLICKJECT) 125 mg/mL AtIn (Order#[id number], Rx#[id number])          Refill Questions - Documented Responses    Flowsheet Row Most Recent Value   Patient Availability and HIPAA Verification    Does patient want to proceed with activity? Yes   HIPAA/medical authority confirmed? Yes   Relationship to patient of person spoken to? Self   Refill Screening Questions    Changes to allergies? No   Changes to medications? No   New conditions since last clinic visit? No   Unplanned office visit, urgent care, ED, or hospital admission in the last 4 weeks? No   How does patient/caregiver feel medication is working? Very good   Financial problems or insurance changes? No   How many doses of your specialty medications were missed in the last 4 weeks? 0   Would patient like to speak to a pharmacist? No   When does the patient need to receive the medication? 05/26/22   Refill Delivery Questions    How will the patient receive the medication? Delivery Zenia   When does the patient need to receive the medication? 05/26/22   Shipping Address Home   Address in [facility] confirmed and updated if neccessary? Yes   Expected Copay ($) 0   Is the patient able to afford the medication copay? Yes   Payment Method zero copay   Days supply of Refill 28   Supplies needed? No supplies needed   Refill activity completed? Yes   Refill activity plan Refill scheduled   Shipment/Pickup Date: 05/24/22          Current Outpatient Medications   Medication Sig    abatacept (ORENCIA CLICKJECT) 125 mg/mL AtIn Inject 125 mg into the skin every 7 days.    aloe vera 5,000 mg Cap Take by mouth.    aspirin (ECOTRIN) 81 MG EC tablet Take 81 mg by mouth once daily.     aspirin 325 MG tablet Take 325 mg by mouth.    buPROPion (WELLBUTRIN SR) 150 MG TBSR 12 hr tablet Take 150 mg by mouth 2 (two)  times daily.    calcium carbonate (OS-LUCIANO) 600 mg calcium (1,500 mg) Tab Take 600 mg by mouth once daily.    cetirizine (ZYRTEC) 10 MG tablet Take 10 mg by mouth daily as needed.    cholecalciferol, vitamin D3, 125 mcg (5,000 unit) Tab Take by mouth.    cyclobenzaprine (FLEXERIL) 10 MG tablet Take 1 tablet (10 mg total) by mouth 3 (three) times daily as needed for Muscle spasms.    cycloSPORINE (RESTASIS) 0.05 % ophthalmic emulsion     diclofenac sodium (VOLTAREN) 1 % Gel Apply 2 g topically.    dicyclomine (BENTYL) 20 mg tablet Take 1 tablet (20 mg total) by mouth 4 (four) times daily.    docusate sodium (COLACE) 100 MG capsule Take 100 mg by mouth.    fluconazole (DIFLUCAN) 150 MG Tab Take 150 mg by mouth once.    folic acid-vit B6-vit B12 (FOLBEE) 2.5-25-1 mg Tab Take 1 tablet by mouth once daily.    glipiZIDE (GLUCOTROL) 5 MG tablet Take 5 mg by mouth.    guaiFENesin (MUCINEX) 600 mg 12 hr tablet Take 1,200 mg by mouth.    lisinopril-hydrochlorothiazide (PRINZIDE,ZESTORETIC) 10-12.5 mg per tablet Take 1 tablet by mouth.    LORazepam (ATIVAN) 0.5 MG tablet Take 0.5 mg by mouth.    miscellaneous medical supply (C-TUB) Misc Glucometer and strips#100    multivitamin (THERAGRAN) per tablet Take 1 tablet by mouth.    nystatin (MYCOSTATIN) 100,000 unit/mL suspension     ondansetron (ZOFRAN-ODT) 4 MG TbDL Take 4 mg by mouth every 8 (eight) hours as needed.     pantoprazole (PROTONIX) 40 MG tablet Take 1 tablet (40 mg total) by mouth once daily.    paroxetine (PAXIL) 10 MG tablet Take 1 tablet (10 mg total) by mouth every evening.    polyethylene glycol (GLYCOLAX) 17 gram/dose powder Take 17 g by mouth.    pravastatin (PRAVACHOL) 40 MG tablet Take 40 mg by mouth.    predniSONE (DELTASONE) 1 MG tablet TAKE 3 TABLETS BY MOUTH ONCE DAILY.    pregabalin (LYRICA) 25 MG capsule Take 1 capsule (25 mg total) by mouth 3 (three) times daily.    promethazine (PHENERGAN) 12.5 MG Tab     traZODone (DESYREL)  100 MG tablet TAKE 1 TABLET BY MOUTH NIGHTLY    triamcinolone acetonide 0.1% (KENALOG) 0.1 % ointment Apply topically 2 (two) times daily.    valACYclovir (VALTREX) 1000 MG tablet TAKE 1 TABLET BY MOUTH THREE TIMES DAILY    zinc gluconate 50 mg tablet Take 50 mg by mouth.   Last reviewed on 3/21/2022  5:16 PM by Zaida Toledo, PharmD    Review of patient's allergies indicates:   Allergen Reactions    Cymbalta [duloxetine] Other (See Comments)     Altered mental status    Last reviewed on  4/18/2022 11:22 AM by Sharyn Hassan      Tasks added this encounter   6/16/2022 - Refill Call (Auto Added)   Tasks due within next 3 months   No tasks due.     Cate Chu, Patient Care Assistant  [facility]  [address] [place], [place] [address]  [facility] [address]  Phone: [phone]  Fax: [phone]

## 2022-06-16 ENCOUNTER — SPECIALTY PHARMACY (OUTPATIENT)
Dept: PHARMACY | Facility: CLINIC | Age: 62
End: 2022-06-16
Payer: MEDICAID

## 2022-06-16 NOTE — TELEPHONE ENCOUNTER
Specialty Pharmacy - Refill Coordination    Specialty Medication Orders Linked to Encounter    Flowsheet Row Most Recent Value   Medication #1 abatacept (ORENCIA CLICKJECT) 125 mg/mL AtIn (Order#[id number], Rx#[id number])          Refill Questions - Documented Responses    Flowsheet Row Most Recent Value   Patient Availability and HIPAA Verification    Does patient want to proceed with activity? Yes   HIPAA/medical authority confirmed? Yes   Relationship to patient of person spoken to? Self   Refill Screening Questions    Changes to allergies? No   Changes to medications? No   New conditions since last clinic visit? No   Unplanned office visit, urgent care, ED, or hospital admission in the last 4 weeks? No   How does patient/caregiver feel medication is working? Good   Financial problems or insurance changes? No   How many doses of your specialty medications were missed in the last 4 weeks? 0   Would patient like to speak to a pharmacist? No   When does the patient need to receive the medication? 06/23/22   Refill Delivery Questions    How will the patient receive the medication? Delivery Zenia   When does the patient need to receive the medication? 06/23/22   Shipping Address Home   Address in [facility] confirmed and updated if neccessary? Yes   Expected Copay ($) 0   Is the patient able to afford the medication copay? Yes   Payment Method zero copay   Days supply of Refill 28   Supplies needed? No supplies needed   Refill activity completed? Yes   Refill activity plan Refill scheduled   Shipment/Pickup Date: 06/21/22          Current Outpatient Medications   Medication Sig    abatacept (ORENCIA CLICKJECT) 125 mg/mL AtIn Inject 125 mg into the skin every 7 days.    aloe vera 5,000 mg Cap Take by mouth.    aspirin (ECOTRIN) 81 MG EC tablet Take 81 mg by mouth once daily.     aspirin 325 MG tablet Take 325 mg by mouth.    buPROPion (WELLBUTRIN SR) 150 MG TBSR 12 hr tablet Take 150 mg by mouth 2 (two)  times daily.    calcium carbonate (OS-LUCIANO) 600 mg calcium (1,500 mg) Tab Take 600 mg by mouth once daily.    cetirizine (ZYRTEC) 10 MG tablet Take 10 mg by mouth daily as needed.    cholecalciferol, vitamin D3, 125 mcg (5,000 unit) Tab Take by mouth.    cyclobenzaprine (FLEXERIL) 10 MG tablet Take 1 tablet (10 mg total) by mouth 3 (three) times daily as needed for Muscle spasms.    cycloSPORINE (RESTASIS) 0.05 % ophthalmic emulsion     diclofenac sodium (VOLTAREN) 1 % Gel Apply 2 g topically.    dicyclomine (BENTYL) 20 mg tablet Take 1 tablet (20 mg total) by mouth 4 (four) times daily.    docusate sodium (COLACE) 100 MG capsule Take 100 mg by mouth.    fluconazole (DIFLUCAN) 150 MG Tab Take 150 mg by mouth once.    folic acid-vit B6-vit B12 (FOLBEE) 2.5-25-1 mg Tab Take 1 tablet by mouth once daily.    glipiZIDE (GLUCOTROL) 5 MG tablet Take 5 mg by mouth.    guaiFENesin (MUCINEX) 600 mg 12 hr tablet Take 1,200 mg by mouth.    lisinopril-hydrochlorothiazide (PRINZIDE,ZESTORETIC) 10-12.5 mg per tablet Take 1 tablet by mouth.    LORazepam (ATIVAN) 0.5 MG tablet Take 0.5 mg by mouth.    miscellaneous medical supply (C-TUB) Misc Glucometer and strips#100    multivitamin (THERAGRAN) per tablet Take 1 tablet by mouth.    nystatin (MYCOSTATIN) 100,000 unit/mL suspension     ondansetron (ZOFRAN-ODT) 4 MG TbDL Take 4 mg by mouth every 8 (eight) hours as needed.     pantoprazole (PROTONIX) 40 MG tablet Take 1 tablet (40 mg total) by mouth once daily.    paroxetine (PAXIL) 10 MG tablet TAKE 1 TABLET BY MOUTH EVERY EVENING    polyethylene glycol (GLYCOLAX) 17 gram/dose powder Take 17 g by mouth.    pravastatin (PRAVACHOL) 40 MG tablet Take 40 mg by mouth.    predniSONE (DELTASONE) 1 MG tablet TAKE 3 TABLETS BY MOUTH ONCE DAILY.    pregabalin (LYRICA) 25 MG capsule Take 1 capsule (25 mg total) by mouth 3 (three) times daily.    promethazine (PHENERGAN) 12.5 MG Tab     traZODone (DESYREL) 100 MG tablet  TAKE 1 TABLET BY MOUTH NIGHTLY    triamcinolone acetonide 0.1% (KENALOG) 0.1 % ointment Apply topically 2 (two) times daily.    valACYclovir (VALTREX) 1000 MG tablet TAKE 1 TABLET BY MOUTH THREE TIMES DAILY    zinc gluconate 50 mg tablet Take 50 mg by mouth.   Last reviewed on 3/21/2022  5:16 PM by Zaida Toledo, PharmD    Review of patient's allergies indicates:   Allergen Reactions    Cymbalta [duloxetine] Other (See Comments)     Altered mental status    Last reviewed on  4/18/2022 11:22 AM by Sharyn Hassan      Tasks added this encounter   No tasks added.   Tasks due within next 3 months   6/16/2022 - Refill Call (Auto Added)     [facility]  [address] [facility] [address]  Phone: [phone]  Fax: [phone]

## 2022-06-29 DIAGNOSIS — M35.1 MCTD (MIXED CONNECTIVE TISSUE DISEASE): ICD-10-CM

## 2022-06-29 NOTE — TELEPHONE ENCOUNTER
Pharmacy requesting refill on [facility]   Pt's LOV 02/14/2022  Pt's NOV 08/30/2022  Medication pending

## 2022-06-30 ENCOUNTER — DOCUMENTATION ONLY (OUTPATIENT)
Dept: PHARMACY | Facility: CLINIC | Age: 62
End: 2022-06-30
Payer: MEDICAID

## 2022-07-01 NOTE — PROGRESS NOTES
PA SUBMITTED FOR PATIENTS PANTOPRAZOLE.     MARIA# [id number]    CHRISTY MENA,[facility]  MED ACCESS

## 2022-07-14 ENCOUNTER — SPECIALTY PHARMACY (OUTPATIENT)
Dept: PHARMACY | Facility: CLINIC | Age: 62
End: 2022-07-14
Payer: MEDICAID

## 2022-07-14 NOTE — TELEPHONE ENCOUNTER
Specialty Pharmacy - Refill Coordination    Specialty Medication Orders Linked to Encounter    Flowsheet Row Most Recent Value   Medication #1 abatacept (ORENCIA CLICKJECT) 125 mg/mL AtIn (Order#[id number], Rx#[id number])          Refill Questions - Documented Responses    Flowsheet Row Most Recent Value   Patient Availability and HIPAA Verification    Does patient want to proceed with activity? Yes   HIPAA/medical authority confirmed? Yes   Relationship to patient of person spoken to? Self   Refill Screening Questions    Changes to allergies? No   Changes to medications? No   New conditions since last clinic visit? No   Unplanned office visit, urgent care, ED, or hospital admission in the last 4 weeks? No   How does patient/caregiver feel medication is working? Good   Financial problems or insurance changes? No   How many doses of your specialty medications were missed in the last 4 weeks? 0   Would patient like to speak to a pharmacist? No   When does the patient need to receive the medication? 07/21/22   Refill Delivery Questions    How will the patient receive the medication? Delivery Zenia   When does the patient need to receive the medication? 07/21/22   Shipping Address Home   Address in [facility] confirmed and updated if neccessary? Yes   Expected Copay ($) 0   Is the patient able to afford the medication copay? Yes   Payment Method zero copay   Days supply of Refill 28   Supplies needed? No supplies needed   Refill activity completed? Yes   Refill activity plan Refill scheduled   Shipment/Pickup Date: 07/19/22          Current Outpatient Medications   Medication Sig    abatacept (ORENCIA CLICKJECT) 125 mg/mL AtIn Inject 125 mg into the skin every 7 days.    aloe vera 5,000 mg Cap Take by mouth.    aspirin (ECOTRIN) 81 MG EC tablet Take 81 mg by mouth once daily.     aspirin 325 MG tablet Take 325 mg by mouth.    buPROPion (WELLBUTRIN SR) 150 MG TBSR 12 hr tablet Take 150 mg by mouth 2 (two)  times daily.    calcium carbonate (OS-LUCIANO) 600 mg calcium (1,500 mg) Tab Take 600 mg by mouth once daily.    cetirizine (ZYRTEC) 10 MG tablet Take 10 mg by mouth daily as needed.    cholecalciferol, vitamin D3, 125 mcg (5,000 unit) Tab Take by mouth.    cyclobenzaprine (FLEXERIL) 10 MG tablet Take 1 tablet (10 mg total) by mouth 3 (three) times daily as needed for Muscle spasms.    cycloSPORINE (RESTASIS) 0.05 % ophthalmic emulsion     diclofenac sodium (VOLTAREN) 1 % Gel Apply 2 g topically.    dicyclomine (BENTYL) 20 mg tablet Take 1 tablet (20 mg total) by mouth 4 (four) times daily.    docusate sodium (COLACE) 100 MG capsule Take 100 mg by mouth.    fluconazole (DIFLUCAN) 150 MG Tab Take 150 mg by mouth once.    folic acid-vit B6-vit B12 (FOLBEE) 2.5-25-1 mg Tab Take 1 tablet by mouth once daily.    glipiZIDE (GLUCOTROL) 5 MG tablet Take 5 mg by mouth.    guaiFENesin (MUCINEX) 600 mg 12 hr tablet Take 1,200 mg by mouth.    lisinopril-hydrochlorothiazide (PRINZIDE,ZESTORETIC) 10-12.5 mg per tablet Take 1 tablet by mouth.    LORazepam (ATIVAN) 0.5 MG tablet Take 0.5 mg by mouth.    miscellaneous medical supply (C-TUB) Misc Glucometer and strips#100    multivitamin (THERAGRAN) per tablet Take 1 tablet by mouth.    nystatin (MYCOSTATIN) 100,000 unit/mL suspension     ondansetron (ZOFRAN-ODT) 4 MG TbDL Take 4 mg by mouth every 8 (eight) hours as needed.     pantoprazole (PROTONIX) 40 MG tablet TAKE 1 TABLET BY MOUTH ONCE DAILY    paroxetine (PAXIL) 10 MG tablet TAKE 1 TABLET BY MOUTH EVERY EVENING    polyethylene glycol (GLYCOLAX) 17 gram/dose powder Take 17 g by mouth.    pravastatin (PRAVACHOL) 40 MG tablet Take 40 mg by mouth.    predniSONE (DELTASONE) 1 MG tablet TAKE 3 TABLETS BY MOUTH ONCE DAILY.    pregabalin (LYRICA) 25 MG capsule Take 1 capsule (25 mg total) by mouth 3 (three) times daily.    promethazine (PHENERGAN) 12.5 MG Tab     traZODone (DESYREL) 100 MG tablet TAKE 1 TABLET BY  MOUTH NIGHTLY    triamcinolone acetonide 0.1% (KENALOG) 0.1 % ointment Apply topically 2 (two) times daily.    valACYclovir (VALTREX) 1000 MG tablet TAKE 1 TABLET BY MOUTH THREE TIMES DAILY    zinc gluconate 50 mg tablet Take 50 mg by mouth.   Last reviewed on 3/21/2022  5:16 PM by Zaida Toledo, PharmD    Review of patient's allergies indicates:   Allergen Reactions    Cymbalta [duloxetine] Other (See Comments)     Altered mental status    Last reviewed on  4/18/2022 11:22 AM by Sharyn Hassan      Tasks added this encounter   No tasks added.   Tasks due within next 3 months   7/14/2022 - Refill Call (Auto Added)     [facility]  [address] [facility] [address]  Phone: [phone]  Fax: [phone]

## 2022-08-11 ENCOUNTER — SPECIALTY PHARMACY (OUTPATIENT)
Dept: PHARMACY | Facility: CLINIC | Age: 62
End: 2022-08-11
Payer: MEDICAID

## 2022-08-11 NOTE — TELEPHONE ENCOUNTER
Specialty Pharmacy - Refill Coordination    Specialty Medication Orders Linked to Encounter    Flowsheet Row Most Recent Value   Medication #1 abatacept (ORENCIA CLICKJECT) 125 mg/mL AtIn (Order#[id number], Rx#[id number])          Refill Questions - Documented Responses    Flowsheet Row Most Recent Value   Patient Availability and HIPAA Verification    Does patient want to proceed with activity? Yes   HIPAA/medical authority confirmed? Yes   Relationship to patient of person spoken to? Self   Refill Screening Questions    Changes to allergies? No   Changes to medications? No   New conditions since last clinic visit? No   Unplanned office visit, urgent care, ED, or hospital admission in the last 4 weeks? No   How does patient/caregiver feel medication is working? Very good   Financial problems or insurance changes? No   How many doses of your specialty medications were missed in the last 4 weeks? 0   Would patient like to speak to a pharmacist? No   When does the patient need to receive the medication? 08/18/22   Refill Delivery Questions    How will the patient receive the medication? Delivery Zenia   When does the patient need to receive the medication? 08/18/22   Shipping Address Home   Address in [facility] confirmed and updated if neccessary? Yes   Expected Copay ($) 0   Is the patient able to afford the medication copay? Yes   Payment Method zero copay   Days supply of Refill 28   Supplies needed? No supplies needed   Refill activity completed? Yes   Refill activity plan Refill scheduled   Shipment/Pickup Date: 08/15/22          Current Outpatient Medications   Medication Sig    abatacept (ORENCIA CLICKJECT) 125 mg/mL AtIn Inject 125 mg into the skin every 7 days.    aloe vera 5,000 mg Cap Take by mouth.    aspirin (ECOTRIN) 81 MG EC tablet Take 81 mg by mouth once daily.     aspirin 325 MG tablet Take 325 mg by mouth.    buPROPion (WELLBUTRIN SR) 150 MG TBSR 12 hr tablet Take 150 mg by mouth 2 (two)  times daily.    calcium carbonate (OS-LUCIANO) 600 mg calcium (1,500 mg) Tab Take 600 mg by mouth once daily.    cetirizine (ZYRTEC) 10 MG tablet Take 10 mg by mouth daily as needed.    cholecalciferol, vitamin D3, 125 mcg (5,000 unit) Tab Take by mouth.    cyclobenzaprine (FLEXERIL) 10 MG tablet Take 1 tablet (10 mg total) by mouth 3 (three) times daily as needed for Muscle spasms.    cycloSPORINE (RESTASIS) 0.05 % ophthalmic emulsion     diclofenac sodium (VOLTAREN) 1 % Gel Apply 2 g topically.    dicyclomine (BENTYL) 20 mg tablet Take 1 tablet (20 mg total) by mouth 4 (four) times daily.    docusate sodium (COLACE) 100 MG capsule Take 100 mg by mouth.    fluconazole (DIFLUCAN) 150 MG Tab Take 150 mg by mouth once.    folic acid-vit B6-vit B12 (FOLBEE) 2.5-25-1 mg Tab Take 1 tablet by mouth once daily.    glipiZIDE (GLUCOTROL) 5 MG tablet Take 5 mg by mouth.    guaiFENesin (MUCINEX) 600 mg 12 hr tablet Take 1,200 mg by mouth.    hydrOXYchloroQUINE (PLAQUENIL) 200 mg tablet TAKE 1 TABLET BY MOUTH ONCE DAILY    lisinopril-hydrochlorothiazide (PRINZIDE,ZESTORETIC) 10-12.5 mg per tablet Take 1 tablet by mouth.    LORazepam (ATIVAN) 0.5 MG tablet Take 0.5 mg by mouth.    miscellaneous medical supply (C-TUB) Misc Glucometer and strips#100    multivitamin (THERAGRAN) per tablet Take 1 tablet by mouth.    nystatin (MYCOSTATIN) 100,000 unit/mL suspension     ondansetron (ZOFRAN-ODT) 4 MG TbDL Take 4 mg by mouth every 8 (eight) hours as needed.     pantoprazole (PROTONIX) 40 MG tablet TAKE 1 TABLET BY MOUTH ONCE DAILY    paroxetine (PAXIL) 10 MG tablet TAKE 1 TABLET BY MOUTH EVERY EVENING    polyethylene glycol (GLYCOLAX) 17 gram/dose powder Take 17 g by mouth.    pravastatin (PRAVACHOL) 40 MG tablet Take 40 mg by mouth.    predniSONE (DELTASONE) 1 MG tablet TAKE 3 TABLETS BY MOUTH ONCE DAILY.    pregabalin (LYRICA) 25 MG capsule Take 1 capsule (25 mg total) by mouth 3 (three) times daily.     promethazine (PHENERGAN) 12.5 MG Tab     traZODone (DESYREL) 100 MG tablet TAKE 1 TABLET BY MOUTH NIGHTLY    triamcinolone acetonide 0.1% (KENALOG) 0.1 % ointment Apply topically 2 (two) times daily.    valACYclovir (VALTREX) 1000 MG tablet TAKE 1 TABLET BY MOUTH THREE TIMES DAILY    zinc gluconate 50 mg tablet Take 50 mg by mouth.   Last reviewed on 3/21/2022  5:16 PM by Zaida Toledo PharmD    Review of patient's allergies indicates:   Allergen Reactions    Cymbalta [duloxetine] Other (See Comments)     Altered mental status    Last reviewed on  4/18/2022 11:22 AM by Sharyn Hassan      Tasks added this encounter   9/8/2022 - Refill Call (Auto Added)   Tasks due within next 3 months   No tasks due.     Sriram Jeter, PharmD  [facility]  [address] [place] [id number]  Phone: [phone]  Fax: [phone]

## 2022-08-16 ENCOUNTER — LAB VISIT (OUTPATIENT)
Dept: LAB | Facility: HOSPITAL | Age: 62
End: 2022-08-16
Attending: INTERNAL MEDICINE
Payer: MEDICAID

## 2022-08-16 DIAGNOSIS — M48.02 CERVICAL SPINAL STENOSIS: ICD-10-CM

## 2022-08-16 DIAGNOSIS — M35.00 SJOGREN'S SYNDROME, WITH UNSPECIFIED ORGAN INVOLVEMENT: ICD-10-CM

## 2022-08-16 DIAGNOSIS — M35.1 MCTD (MIXED CONNECTIVE TISSUE DISEASE): ICD-10-CM

## 2022-08-16 DIAGNOSIS — E06.3 HASHIMOTO'S THYROIDITIS: ICD-10-CM

## 2022-08-16 DIAGNOSIS — M45.9 ANKYLOSING SPONDYLITIS, UNSPECIFIED SITE OF SPINE: ICD-10-CM

## 2022-08-16 DIAGNOSIS — M47.12 CERVICAL ARTHRITIS WITH MYELOPATHY: ICD-10-CM

## 2022-08-16 LAB
ALBUMIN SERPL BCP-MCNC: 4.1 G/DL (ref 3.5–5.2)
ALP SERPL-CCNC: 49 U/L (ref 55–135)
ALT SERPL W/O P-5'-P-CCNC: 29 U/L (ref 10–44)
ANION GAP SERPL CALC-SCNC: 8 MMOL/L (ref 8–16)
AST SERPL-CCNC: 29 U/L (ref 10–40)
BASOPHILS # BLD AUTO: 0.01 K/UL (ref 0–0.2)
BASOPHILS NFR BLD: 0.2 % (ref 0–1.9)
BILIRUB SERPL-MCNC: 0.4 MG/DL (ref 0.1–1)
BUN SERPL-MCNC: 14 MG/DL (ref 8–23)
CALCIUM SERPL-MCNC: 10 MG/DL (ref 8.7–10.5)
CHLORIDE SERPL-SCNC: 100 MMOL/L (ref 95–110)
CO2 SERPL-SCNC: 33 MMOL/L (ref 23–29)
CREAT SERPL-MCNC: 0.7 MG/DL (ref 0.5–1.4)
CRP SERPL-MCNC: <0.3 MG/L (ref 0–8.2)
DIFFERENTIAL METHOD: ABNORMAL
EOSINOPHIL # BLD AUTO: 0.1 K/UL (ref 0–0.5)
EOSINOPHIL NFR BLD: 1.2 % (ref 0–8)
ERYTHROCYTE [DISTWIDTH] IN BLOOD BY AUTOMATED COUNT: 13.4 % (ref 11.5–14.5)
ERYTHROCYTE [SEDIMENTATION RATE] IN BLOOD BY PHOTOMETRIC METHOD: <2 MM/HR (ref 0–36)
EST. GFR  (NO RACE VARIABLE): >60 ML/MIN/1.73 M^2
GLUCOSE SERPL-MCNC: 93 MG/DL (ref 70–110)
HCT VFR BLD AUTO: 43.1 % (ref 37–48.5)
HGB BLD-MCNC: 14.8 G/DL (ref 12–16)
IMM GRANULOCYTES # BLD AUTO: 0.01 K/UL (ref 0–0.04)
IMM GRANULOCYTES NFR BLD AUTO: 0.2 % (ref 0–0.5)
LYMPHOCYTES # BLD AUTO: 1.5 K/UL (ref 1–4.8)
LYMPHOCYTES NFR BLD: 24.6 % (ref 18–48)
MCH RBC QN AUTO: 32.1 PG (ref 27–31)
MCHC RBC AUTO-ENTMCNC: 34.3 G/DL (ref 32–36)
MCV RBC AUTO: 94 FL (ref 82–98)
MONOCYTES # BLD AUTO: 0.4 K/UL (ref 0.3–1)
MONOCYTES NFR BLD: 7.3 % (ref 4–15)
NEUTROPHILS # BLD AUTO: 4 K/UL (ref 1.8–7.7)
NEUTROPHILS NFR BLD: 66.5 % (ref 38–73)
NRBC BLD-RTO: 0 /100 WBC
PLATELET # BLD AUTO: 127 K/UL (ref 150–450)
PMV BLD AUTO: 11.1 FL (ref 9.2–12.9)
POTASSIUM SERPL-SCNC: 4.3 MMOL/L (ref 3.5–5.1)
PROT SERPL-MCNC: 7.2 G/DL (ref 6–8.4)
RBC # BLD AUTO: 4.61 M/UL (ref 4–5.4)
SODIUM SERPL-SCNC: 141 MMOL/L (ref 136–145)
WBC # BLD AUTO: 6.01 K/UL (ref 3.9–12.7)

## 2022-08-16 PROCEDURE — 85652 RBC SED RATE AUTOMATED: CPT | Performed by: INTERNAL MEDICINE

## 2022-08-16 PROCEDURE — 85025 COMPLETE CBC W/AUTO DIFF WBC: CPT | Performed by: INTERNAL MEDICINE

## 2022-08-16 PROCEDURE — 80053 COMPREHEN METABOLIC PANEL: CPT | Performed by: INTERNAL MEDICINE

## 2022-08-16 PROCEDURE — 86140 C-REACTIVE PROTEIN: CPT | Performed by: INTERNAL MEDICINE

## 2022-08-16 PROCEDURE — 36415 COLL VENOUS BLD VENIPUNCTURE: CPT | Mod: PO | Performed by: INTERNAL MEDICINE

## 2022-08-24 ENCOUNTER — SPECIALTY PHARMACY (OUTPATIENT)
Dept: PHARMACY | Facility: CLINIC | Age: 62
End: 2022-08-24
Payer: MEDICAID

## 2022-08-24 DIAGNOSIS — M06.00 SERONEGATIVE RHEUMATOID ARTHRITIS: Primary | ICD-10-CM

## 2022-08-24 NOTE — TELEPHONE ENCOUNTER
Incoming call from pt seeking advice on Orencia. Pt holding therapy.  I-vent opened. Routed to assigned PHARMD to follow up.

## 2022-08-30 ENCOUNTER — OFFICE VISIT (OUTPATIENT)
Dept: RHEUMATOLOGY | Facility: CLINIC | Age: 62
End: 2022-08-30
Payer: MEDICAID

## 2022-08-30 VITALS
BODY MASS INDEX: 21.83 KG/M2 | WEIGHT: 115.63 LBS | SYSTOLIC BLOOD PRESSURE: 143 MMHG | HEIGHT: 61 IN | DIASTOLIC BLOOD PRESSURE: 70 MMHG | HEART RATE: 73 BPM

## 2022-08-30 DIAGNOSIS — K90.41 NCGS (NON-CELIAC GLUTEN SENSITIVITY): Primary | ICD-10-CM

## 2022-08-30 DIAGNOSIS — G89.4 CHRONIC PAIN SYNDROME: ICD-10-CM

## 2022-08-30 DIAGNOSIS — M35.00 SJOGREN'S SYNDROME, WITH UNSPECIFIED ORGAN INVOLVEMENT: ICD-10-CM

## 2022-08-30 DIAGNOSIS — M35.1 MCTD (MIXED CONNECTIVE TISSUE DISEASE): ICD-10-CM

## 2022-08-30 DIAGNOSIS — L40.50 PSA (PSORIATIC ARTHRITIS): ICD-10-CM

## 2022-08-30 DIAGNOSIS — M45.9 ANKYLOSING SPONDYLITIS, UNSPECIFIED SITE OF SPINE: ICD-10-CM

## 2022-08-30 PROCEDURE — 3077F SYST BP >= 140 MM HG: CPT | Mod: CPTII,,, | Performed by: INTERNAL MEDICINE

## 2022-08-30 PROCEDURE — 1159F MED LIST DOCD IN RCRD: CPT | Mod: CPTII,,, | Performed by: INTERNAL MEDICINE

## 2022-08-30 PROCEDURE — 99215 OFFICE O/P EST HI 40 MIN: CPT | Mod: PBBFAC,PN | Performed by: INTERNAL MEDICINE

## 2022-08-30 PROCEDURE — 99999 PR PBB SHADOW E&M-EST. PATIENT-LVL V: CPT | Mod: PBBFAC,,, | Performed by: INTERNAL MEDICINE

## 2022-08-30 PROCEDURE — 99215 OFFICE O/P EST HI 40 MIN: CPT | Mod: 25,S$PBB,, | Performed by: INTERNAL MEDICINE

## 2022-08-30 PROCEDURE — 3008F PR BODY MASS INDEX (BMI) DOCUMENTED: ICD-10-PCS | Mod: CPTII,,, | Performed by: INTERNAL MEDICINE

## 2022-08-30 PROCEDURE — 4010F ACE/ARB THERAPY RXD/TAKEN: CPT | Mod: CPTII,,, | Performed by: INTERNAL MEDICINE

## 2022-08-30 PROCEDURE — 4010F PR ACE/ARB THEARPY RXD/TAKEN: ICD-10-PCS | Mod: CPTII,,, | Performed by: INTERNAL MEDICINE

## 2022-08-30 PROCEDURE — 99999 PR PBB SHADOW E&M-EST. PATIENT-LVL V: ICD-10-PCS | Mod: PBBFAC,,, | Performed by: INTERNAL MEDICINE

## 2022-08-30 PROCEDURE — 3008F BODY MASS INDEX DOCD: CPT | Mod: CPTII,,, | Performed by: INTERNAL MEDICINE

## 2022-08-30 PROCEDURE — 3078F DIAST BP <80 MM HG: CPT | Mod: CPTII,,, | Performed by: INTERNAL MEDICINE

## 2022-08-30 PROCEDURE — 3077F PR MOST RECENT SYSTOLIC BLOOD PRESSURE >= 140 MM HG: ICD-10-PCS | Mod: CPTII,,, | Performed by: INTERNAL MEDICINE

## 2022-08-30 PROCEDURE — 1159F PR MEDICATION LIST DOCUMENTED IN MEDICAL RECORD: ICD-10-PCS | Mod: CPTII,,, | Performed by: INTERNAL MEDICINE

## 2022-08-30 PROCEDURE — 99215 PR OFFICE/OUTPT VISIT, EST, LEVL V, 40-54 MIN: ICD-10-PCS | Mod: 25,S$PBB,, | Performed by: INTERNAL MEDICINE

## 2022-08-30 PROCEDURE — 3078F PR MOST RECENT DIASTOLIC BLOOD PRESSURE < 80 MM HG: ICD-10-PCS | Mod: CPTII,,, | Performed by: INTERNAL MEDICINE

## 2022-08-30 RX ORDER — KETOROLAC TROMETHAMINE 30 MG/ML
60 INJECTION, SOLUTION INTRAMUSCULAR; INTRAVENOUS
Status: COMPLETED | OUTPATIENT
Start: 2022-08-30 | End: 2022-09-05

## 2022-08-30 RX ORDER — METHYLPREDNISOLONE ACETATE 80 MG/ML
80 INJECTION, SUSPENSION INTRA-ARTICULAR; INTRALESIONAL; INTRAMUSCULAR; SOFT TISSUE
Status: COMPLETED | OUTPATIENT
Start: 2022-08-30 | End: 2022-09-05

## 2022-08-30 RX ORDER — HYDROCODONE BITARTRATE AND ACETAMINOPHEN 10; 325 MG/1; MG/1
1 TABLET ORAL EVERY 8 HOURS PRN
Qty: 90 TABLET | Refills: 0 | Status: SHIPPED | OUTPATIENT
Start: 2022-08-30 | End: 2022-09-29

## 2022-08-30 ASSESSMENT — ROUTINE ASSESSMENT OF PATIENT INDEX DATA (RAPID3)
FATIGUE SCORE: 2.2
PSYCHOLOGICAL DISTRESS SCORE: 2.2
MDHAQ FUNCTION SCORE: 1.7
TOTAL RAPID3 SCORE: 5.55
PAIN SCORE: 7.5
PATIENT GLOBAL ASSESSMENT SCORE: 3.5

## 2022-08-30 NOTE — PROGRESS NOTES
Subjective:       Patient ID: Malu Holland is a 62 y.o. female.    Chief Complaint: Disease Management    Follow up: 61 yo female severe osteoarthritis cervical spine with spinal stenosis,from DM gastroparesis. ITP A positive rei 1: 80, spinal stenosis, she gained 12 lbs, she is drink 30 G protein she has severe vertigo and off balance.Patient complains of arthralgias and myalgias for which has been present for a few years. Pain is located in multiple joints, both shoulder(s), both elbow(s), both wrist(s), both MCP(s): 1st, 2nd, 3rd, 4th and 5th, both PIP(s): 1st, 2nd, 3rd, 4th and 5th, both DIP(s): 1st and 2nd, both hip(s), both knee(s) and both MTP(s): 1st, 2nd, 3rd, 4th and 5th, is described as aching, pulsating, shooting and throbbing, and is constant, moderate .  Associated symptoms include: crepitation, decreased range of motion, edema, effusion, tenderness and warmth. Her weight loss has stabilized.            She complains of joint swelling.       Review of Systems   Constitutional:  Positive for activity change, appetite change and unexpected weight change. Negative for chills and diaphoresis.   HENT:  Negative for congestion, dental problem, ear discharge, ear pain, facial swelling, mouth sores, nosebleeds, postnasal drip, rhinorrhea, sinus pressure, sneezing, sore throat, tinnitus and voice change.    Eyes:  Negative for photophobia, pain, discharge, redness and itching.   Respiratory:  Negative for apnea, cough, chest tightness, shortness of breath and wheezing.    Cardiovascular:  Positive for leg swelling. Negative for chest pain and palpitations.   Gastrointestinal:  Negative for abdominal distention, abdominal pain, constipation, diarrhea, nausea and vomiting.   Endocrine: Negative for cold intolerance, heat intolerance, polydipsia and polyuria.   Genitourinary:  Negative for decreased urine volume, difficulty urinating, flank pain, frequency, hematuria and urgency.   Musculoskeletal:  Positive for  "arthralgias, back pain, joint swelling, neck pain and neck stiffness.   Skin:  Positive for rash. Negative for pallor and wound.   Allergic/Immunologic: Positive for immunocompromised state.   Neurological:  Negative for dizziness, tremors, weakness and numbness.   Hematological:  Negative for adenopathy. Bruises/bleeds easily.   Psychiatric/Behavioral:  Negative for sleep disturbance. The patient is not nervous/anxious.        Objective:   BP (!) 143/70   Pulse 73   Ht 5' 0.98" (1.549 m)   Wt 52.4 kg (115 lb 10.1 oz)   BMI 21.86 kg/m²      Physical Exam   Constitutional: She is oriented to person, place, and time. No distress.   HENT:   Head: Normocephalic and atraumatic.   Mouth/Throat: Oropharynx is clear and moist.   Eyes: Pupils are equal, round, and reactive to light.   Neck: No thyromegaly present.   Cardiovascular: Normal rate, regular rhythm and normal heart sounds. Exam reveals no gallop and no friction rub.   No murmur heard.  Pulmonary/Chest: Breath sounds normal. She has no wheezes. She has no rales. She exhibits no tenderness.   Abdominal: There is no abdominal tenderness. There is no rebound and no guarding.   Musculoskeletal:         General: Tenderness and deformity present.      Right shoulder: Tenderness present.      Left shoulder: Tenderness present.      Right elbow: Normal.      Left elbow: Tenderness present.      Right wrist: Tenderness present.      Left wrist: Tenderness present.      Cervical back: Neck supple.      Right knee: Effusion present. Tenderness present.      Left knee: Effusion present. Tenderness present.   Lymphadenopathy:     She has no cervical adenopathy.   Neurological: She is alert and oriented to person, place, and time. She displays weakness and atrophy.   Reflex Scores:       Brachioradialis reflexes are 1+ on the left side.       Patellar reflexes are 1+ on the right side and 1+ on the left side.  Skin: Rash noted. No erythema. No pallor.   Psychiatric: Mood, " memory, affect and judgment normal.   Vitals reviewed.      Right Side Rheumatological Exam     Examination finds the elbow normal.    The patient is tender to palpation of the shoulder, wrist, knee, 1st PIP, 1st MCP, 2nd PIP, 2nd MCP, 3rd PIP, 3rd MCP, 4th PIP, 4th MCP, 5th PIP and 5th MCP    Shoulder Exam   Tenderness Location: acromion    Range of Motion   Active abduction:  abnormal   Adduction: abnormal  Sensation: normal    Knee Exam   Tenderness Location: medial joint line and LCL  Patellofemoral Crepitus: positive  Effusion: positive  Sensation: normal    Hip Exam   Tenderness Location: posterior and lateral  Sensation: normal    Elbow/Wrist Exam   Tenderness Location: no tenderness  Sensation: normal    Left Side Rheumatological Exam     The patient is tender to palpation of the shoulder, elbow, wrist, knee, 1st PIP, 1st MCP, 2nd PIP, 2nd MCP, 3rd PIP, 3rd MCP, 4th PIP, 4th MCP, 5th PIP and 5th MCP.    Shoulder Exam   Tenderness Location: acromion    Range of Motion   Active abduction:  abnormal   Sensation: normal    Knee Exam   Tenderness Location: lateral joint line and medial joint line    Patellofemoral Crepitus: positive  Effusion: positive  Sensation: normal    Hip Exam   Tenderness Location: posterior and lateral  Sensation: normal    Elbow/Wrist Exam   Sensation: normal      Back/Neck Exam   General Inspection   Gait: abnormal       Tenderness Right paramedian tenderness of the Upper C-Spine, Upper T-Spine, Upper L-Spine and SI Joint.Left paramedian tenderness of the Upper C-Spine, Upper T-Spine, SI Joint and Upper L-Spine.    Back Range of Motion   Lateral Bend Right:  abnormal  Lateral Bend Left:  abnormal  Rotation Right:  abnormal  Rotation Left:  abnormal    Neck Range of Motion   Flexion:  Severely limited  Extension:  Severely limited  Right Lateral Bend: abnormal  Left Lateral Bend: abnormal  Right Rotation: abnormal  Left Rotation: abnormal    Back Tests   Right Side Tests    Squat Test:  unable to perform   Left Side Tests    Squat: unable to perform         Results for orders placed or performed in visit on 08/16/22   CBC Auto Differential   Result Value Ref Range    WBC 6.01 3.90 - 12.70 K/uL    RBC 4.61 4.00 - 5.40 M/uL    Hemoglobin 14.8 12.0 - 16.0 g/dL    Hematocrit 43.1 37.0 - 48.5 %    MCV 94 82 - 98 fL    MCH 32.1 (H) 27.0 - 31.0 pg    MCHC 34.3 32.0 - 36.0 g/dL    RDW 13.4 11.5 - 14.5 %    Platelets 127 (L) 150 - 450 K/uL    MPV 11.1 9.2 - 12.9 fL    Immature Granulocytes 0.2 0.0 - 0.5 %    Gran # (ANC) 4.0 1.8 - 7.7 K/uL    Immature Grans (Abs) 0.01 0.00 - 0.04 K/uL    Lymph # 1.5 1.0 - 4.8 K/uL    Mono # 0.4 0.3 - 1.0 K/uL    Eos # 0.1 0.0 - 0.5 K/uL    Baso # 0.01 0.00 - 0.20 K/uL    nRBC 0 0 /100 WBC    Gran % 66.5 38.0 - 73.0 %    Lymph % 24.6 18.0 - 48.0 %    Mono % 7.3 4.0 - 15.0 %    Eosinophil % 1.2 0.0 - 8.0 %    Basophil % 0.2 0.0 - 1.9 %    Differential Method Automated    Comprehensive Metabolic Panel   Result Value Ref Range    Sodium 141 136 - 145 mmol/L    Potassium 4.3 3.5 - 5.1 mmol/L    Chloride 100 95 - 110 mmol/L    CO2 33 (H) 23 - 29 mmol/L    Glucose 93 70 - 110 mg/dL    BUN 14 8 - 23 mg/dL    Creatinine 0.7 0.5 - 1.4 mg/dL    Calcium 10.0 8.7 - 10.5 mg/dL    Total Protein 7.2 6.0 - 8.4 g/dL    Albumin 4.1 3.5 - 5.2 g/dL    Total Bilirubin 0.4 0.1 - 1.0 mg/dL    Alkaline Phosphatase 49 (L) 55 - 135 U/L    AST 29 10 - 40 U/L    ALT 29 10 - 44 U/L    Anion Gap 8 8 - 16 mmol/L    eGFR >60.0 >60 mL/min/1.73 m^2   Sedimentation rate   Result Value Ref Range    Sed Rate <2 0 - 36 mm/Hr   C-Reactive Protein   Result Value Ref Range    CRP <0.3 0.0 - 8.2 mg/L     Impression    Normal       Electronically Signed By: Norm Cobb MD 5/7/2019 13:47 CDT    Narrative    [facility] [place] [facility] [place] W [place] CONTRAST     CLINICAL HISTORY:   DIAGNOSIS:E04.1   Thyroid nodule   REASON FOR STUDY:thyroid nodule   ADDITIONAL HISTORY: None.   PROVIDER COMMENTS:     TECHNIQUE:   T1 and T2-weighted and post  IV contrast MR sequences were obtained of the brain     CONTRAST: 10 mL Magnevist IV     COMPARISON:None.     FINDINGS:     No abnormal intra-axial or extra-axial fluid collections. No mass, mass effect or edema. No diffusion restriction. No focal signal abnormalities. No abnormal contrast enhancement. Ventricles appear normal. Vascular flow voids are intact. No significant scalp or calvarial signal abnormalities.    Other Result Text    Maninder, Rad Results In - 05/07/2019  1:47 PM CDT   [facility] [place] [facility] [place] CONTRAST     CLINICAL HISTORY:   DIAGNOSIS:E04.1   Thyroid nodule   REASON FOR STUDY:thyroid nodule   ADDITIONAL HISTORY: None.   PROVIDER COMMENTS:     TECHNIQUE:   T1 and T2-weighted and post IV contrast MR sequences were obtained of the brain     CONTRAST: 10 mL Magnevist IV     COMPARISON:None.     FINDINGS:     No abnormal intra-axial or extra-axial fluid collections. No mass, mass effect or edema. No diffusion restriction. No focal signal abnormalities. No abnormal contrast enhancement. Ventricles appear normal. Vascular flow voids are intact. No significant scalp or calvarial signal abnormalities.     IMPRESSION:   Normal       Electronically Signed By: Norm Cobb MD 5/7/2019 13:47 CDT  Specimen Collected: -- Last Resulted: --   Date: 05/07/19    Received From: [facility]       Status: Final result    Visible to patient: Yes (not seen)    Dx: Hashimoto's thyroiditis; Chronic pain...    0 Result Notes     Ref Range & Units 6 d ago   EBV VCA IgG <18.0 U/mL >750.0 High     Comment: INTERPRETATION:     Positive: IgG antibody detected which may   indicate current or previous exposure.    EBV VCA IgM <36.0 U/mL <10.0    Comment: INTERPRETATION:     Negative:  No antibody detected.   For clinical interpretation please refer to   the Immunology Viral Serology appendix in   the [facility] test catalog.     Test performed at [facility],   [address], [place]  [address]      [phone]   Teddy Rubio MD  - Medical Director    EBV Early Antigen Ab, IgG <9.0 U/mL 16.8 High     Comment: INTERPRETATION:     Positive: IgG antibody detected which may   indicate current or previous exposure.    EBV Nuclear Ag Ab <18.0 U/mL 134.0 High     Comment: INTERPRETATION:     Positive: IgG antibody detected which may   indicate current or previous exposure.    Resulting Agency  [facility]                             Assessment:       1. NCGS (non-celiac gluten sensitivity)    2. Sjogren's syndrome, with unspecified organ involvement    3. Ankylosing spondylitis, unspecified site of spine    4. Chronic pain syndrome    5. PSA (psoriatic arthritis)    6. MCTD (mixed connective tissue disease)              Plan:     Malu was seen today for disease management.    Diagnoses and all orders for this visit:    NCGS (non-celiac gluten sensitivity)  -     HYDROcodone-acetaminophen (NORCO)  mg per tablet; Take 1 tablet by mouth every 8 (eight) hours as needed for Pain.  -     CBC Auto Differential; Future  -     Comprehensive Metabolic Panel; Future  -     Sedimentation rate; Future  -     C-Reactive Protein; Future  -     ketorolac injection 60 mg  -     methylPREDNISolone acetate injection 80 mg    Sjogren's syndrome, with unspecified organ involvement  -     HYDROcodone-acetaminophen (NORCO)  mg per tablet; Take 1 tablet by mouth every 8 (eight) hours as needed for Pain.  -     CBC Auto Differential; Future  -     Comprehensive Metabolic Panel; Future  -     Sedimentation rate; Future  -     C-Reactive Protein; Future  -     ketorolac injection 60 mg  -     methylPREDNISolone acetate injection 80 mg    Ankylosing spondylitis, unspecified site of spine  -     HYDROcodone-acetaminophen (NORCO)  mg per tablet; Take 1 tablet by mouth every 8 (eight) hours as needed for Pain.  -     CBC Auto Differential; Future  -     Comprehensive Metabolic Panel; Future  -     Sedimentation rate; Future  -      C-Reactive Protein; Future  -     ketorolac injection 60 mg  -     methylPREDNISolone acetate injection 80 mg    Chronic pain syndrome  -     HYDROcodone-acetaminophen (NORCO)  mg per tablet; Take 1 tablet by mouth every 8 (eight) hours as needed for Pain.  -     CBC Auto Differential; Future  -     Comprehensive Metabolic Panel; Future  -     Sedimentation rate; Future  -     C-Reactive Protein; Future  -     ketorolac injection 60 mg  -     methylPREDNISolone acetate injection 80 mg    PSA (psoriatic arthritis)  -     HYDROcodone-acetaminophen (NORCO)  mg per tablet; Take 1 tablet by mouth every 8 (eight) hours as needed for Pain.  -     CBC Auto Differential; Future  -     Comprehensive Metabolic Panel; Future  -     Sedimentation rate; Future  -     C-Reactive Protein; Future  -     ketorolac injection 60 mg  -     methylPREDNISolone acetate injection 80 mg    MCTD (mixed connective tissue disease)  -     HYDROcodone-acetaminophen (NORCO)  mg per tablet; Take 1 tablet by mouth every 8 (eight) hours as needed for Pain.  -     CBC Auto Differential; Future  -     Comprehensive Metabolic Panel; Future  -     Sedimentation rate; Future  -     C-Reactive Protein; Future        1. On plaquenil may be making it worse  2. Start xeljanz  3 refer to family practice  4. I have  check [place] prescription monitoring program site and no unusual or abnormal behavior has occurred pt understand the risk and benefits of taking opioid medications and has decided to continue the  medication     More than 50% of the  40 minute encounter was spent face to face counseling the patient regarding current status and future plan of care as well as side of the medications. All questions were answered to patient's satisfaction

## 2022-09-05 RX ADMIN — METHYLPREDNISOLONE ACETATE 80 MG: 80 INJECTION, SUSPENSION INTRA-ARTICULAR; INTRALESIONAL; INTRAMUSCULAR; SOFT TISSUE at 10:09

## 2022-09-05 RX ADMIN — KETOROLAC TROMETHAMINE 60 MG: 60 INJECTION, SOLUTION INTRAMUSCULAR at 10:09

## 2022-09-08 NOTE — TELEPHONE ENCOUNTER
Specialty Pharmacy - Clinical Intervention  Specialty Pharmacy - Refill Coordination    Specialty Medication Orders Linked to Encounter      Flowsheet Row Most Recent Value   Medication #1 abatacept (ORENCIA CLICKJECT) 125 mg/mL AtIn (Order#[id number], Rx#[id number])            Refill Questions - Documented Responses      Flowsheet Row Most Recent Value   Patient Availability and HIPAA Verification    Does patient want to proceed with activity? Yes   HIPAA/medical authority confirmed? Yes   Relationship to patient of person spoken to? Self   Refill Screening Questions    Changes to allergies? No   Changes to medications? No   New conditions since last clinic visit? No   Unplanned office visit, urgent care, ED, or hospital admission in the last 4 weeks? No   How does patient/caregiver feel medication is working? Very good   Financial problems or insurance changes? No   How many doses of your specialty medications were missed in the last 4 weeks? 1   Why were doses missed? Felt ill or sick   Would patient like to speak to a pharmacist? No   When does the patient need to receive the medication? 09/20/22   Refill Delivery Questions    How will the patient receive the medication? Delivery Eznia   When does the patient need to receive the medication? 09/20/22   Shipping Address Home   Address in [facility] confirmed and updated if neccessary? Yes   Expected Copay ($) 0   Is the patient able to afford the medication copay? Yes   Payment Method zero copay   Days supply of Refill 28   Supplies needed? No supplies needed   Refill activity completed? Yes   Refill activity plan Refill scheduled   Shipment/Pickup Date: 09/12/22            Current Outpatient Medications   Medication Sig    abatacept (ORENCIA CLICKJECT) 125 mg/mL AtIn Inject 125 mg into the skin every 7 days.    aloe vera 5,000 mg Cap Take by mouth.    aspirin (ECOTRIN) 81 MG EC tablet Take 81 mg by mouth once daily.     aspirin 325 MG tablet Take 325 mg by  mouth.    buPROPion (WELLBUTRIN SR) 150 MG TBSR 12 hr tablet Take 150 mg by mouth 2 (two) times daily.    calcium carbonate (OS-LUCIANO) 600 mg calcium (1,500 mg) Tab Take 600 mg by mouth once daily.    cetirizine (ZYRTEC) 10 MG tablet Take 10 mg by mouth daily as needed.    cholecalciferol, vitamin D3, 125 mcg (5,000 unit) Tab Take by mouth.    cyclobenzaprine (FLEXERIL) 10 MG tablet Take 1 tablet (10 mg total) by mouth 3 (three) times daily as needed for Muscle spasms.    cycloSPORINE (RESTASIS) 0.05 % ophthalmic emulsion     diclofenac sodium (VOLTAREN) 1 % Gel Apply 2 g topically.    dicyclomine (BENTYL) 20 mg tablet Take 1 tablet (20 mg total) by mouth 4 (four) times daily.    docusate sodium (COLACE) 100 MG capsule Take 100 mg by mouth.    fluconazole (DIFLUCAN) 150 MG Tab Take 150 mg by mouth once.    folic acid-vit B6-vit B12 (FOLBEE) 2.5-25-1 mg Tab Take 1 tablet by mouth once daily.    glipiZIDE (GLUCOTROL) 5 MG tablet Take 5 mg by mouth.    guaiFENesin (MUCINEX) 600 mg 12 hr tablet Take 1,200 mg by mouth.    HYDROcodone-acetaminophen (NORCO)  mg per tablet Take 1 tablet by mouth every 8 (eight) hours as needed for Pain.    hydrOXYchloroQUINE (PLAQUENIL) 200 mg tablet TAKE 1 TABLET BY MOUTH ONCE DAILY    lisinopril-hydrochlorothiazide (PRINZIDE,ZESTORETIC) 10-12.5 mg per tablet Take 1 tablet by mouth.    LORazepam (ATIVAN) 0.5 MG tablet Take 0.5 mg by mouth.    miscellaneous medical supply (C-TUB) Misc Glucometer and strips#100    multivitamin (THERAGRAN) per tablet Take 1 tablet by mouth.    nystatin (MYCOSTATIN) 100,000 unit/mL suspension     ondansetron (ZOFRAN-ODT) 4 MG TbDL Take 4 mg by mouth every 8 (eight) hours as needed.     pantoprazole (PROTONIX) 40 MG tablet TAKE 1 TABLET BY MOUTH ONCE DAILY    paroxetine (PAXIL) 10 MG tablet TAKE 1 TABLET BY MOUTH EVERY EVENING    polyethylene glycol (GLYCOLAX) 17 gram/dose powder Take 17 g by mouth.    pravastatin (PRAVACHOL) 40 MG tablet Take 40 mg by  mouth.    predniSONE (DELTASONE) 1 MG tablet TAKE 3 TABLETS BY MOUTH ONCE DAILY.    pregabalin (LYRICA) 25 MG capsule Take 1 capsule (25 mg total) by mouth 3 (three) times daily.    promethazine (PHENERGAN) 12.5 MG Tab     traZODone (DESYREL) 100 MG tablet TAKE 1 TABLET BY MOUTH NIGHTLY    triamcinolone acetonide 0.1% (KENALOG) 0.1 % ointment Apply topically 2 (two) times daily.    valACYclovir (VALTREX) 1000 MG tablet TAKE 1 TABLET BY MOUTH THREE TIMES DAILY    zinc gluconate 50 mg tablet Take 50 mg by mouth.   Last reviewed on 8/30/2022  1:09 PM by Janie Lockett MA    Review of patient's allergies indicates:   Allergen Reactions    Cymbalta [duloxetine] Other (See Comments)     Altered mental status    Last reviewed on  8/30/2022 1:09 PM by Janie Lockett    Interventions added this encounter   Closed: [facility] Patient Intervention - Drug therapy appropriateness: abatacept (ORENCIA CLICKJECT) 125 mg/mL AtIn     Tasks added this encounter   10/11/2022 - Refill Call (Auto Added)   Tasks due within next 3 months   No tasks due.     Sigrid Lozano, PharmD  [facility]  [address] [place] [id number]  Phone: [phone]  Fax: [phone]

## 2022-10-11 ENCOUNTER — SPECIALTY PHARMACY (OUTPATIENT)
Dept: PHARMACY | Facility: CLINIC | Age: 62
End: 2022-10-11
Payer: MEDICAID

## 2022-10-11 DIAGNOSIS — M06.00 SERONEGATIVE RHEUMATOID ARTHRITIS: ICD-10-CM

## 2022-10-11 DIAGNOSIS — M06.00 SERONEGATIVE RHEUMATOID ARTHRITIS: Primary | ICD-10-CM

## 2022-10-11 RX ORDER — ABATACEPT 125 MG/ML
125 INJECTION, SOLUTION SUBCUTANEOUS
Qty: 4 ML | Refills: 6 | Status: CANCELLED | OUTPATIENT
Start: 2022-10-11

## 2022-10-11 NOTE — TELEPHONE ENCOUNTER
Patient has a dose for this Thursday 10/13. Patients following dose is due 10/20. Faxed for new rx on 10/11. Will scheduled call back for 10/14 to refill new rx once it is sent in.

## 2022-10-14 DIAGNOSIS — M06.00 SERONEGATIVE RHEUMATOID ARTHRITIS: ICD-10-CM

## 2022-10-17 RX ORDER — ABATACEPT 125 MG/ML
125 INJECTION, SOLUTION SUBCUTANEOUS
Qty: 4 ML | Refills: 6 | Status: SHIPPED | OUTPATIENT
Start: 2022-10-17 | End: 2023-03-01

## 2022-10-20 NOTE — TELEPHONE ENCOUNTER
Outgoing call to pt regarding Orencia refill. Pt was due to inject on 10/20. Claim rejecting for PA required. Will submit urgent PA.

## 2022-10-21 NOTE — TELEPHONE ENCOUNTER
Specialty Pharmacy - Refill Coordination  Specialty Pharmacy - Medication/Referral Authorization    Specialty Medication Orders Linked to Encounter      Flowsheet Row Most Recent Value   Medication #1 abatacept (ORENCIA CLICKJECT) 125 mg/mL AtIn (Order#[id number], Rx#[id number])            Refill Questions - Documented Responses      Flowsheet Row Most Recent Value   Patient Availability and HIPAA Verification    Does patient want to proceed with activity? Yes   HIPAA/medical authority confirmed? Yes   Relationship to patient of person spoken to? Self   Refill Screening Questions    Changes to allergies? No   Changes to medications? No   New conditions since last clinic visit? No   Unplanned office visit, urgent care, ED, or hospital admission in the last 4 weeks? No   How does patient/caregiver feel medication is working? Good   Financial problems or insurance changes? No   How many doses of your specialty medications were missed in the last 4 weeks? 1   Why were doses missed? --  [Pt will inject on monday and adjust calendar-needed refill then PA]   Would patient like to speak to a pharmacist? No   When does the patient need to receive the medication? 10/24/22   Refill Delivery Questions    How will the patient receive the medication? MEDRx   When does the patient need to receive the medication? 10/24/22   Shipping Address Home   Address in [facility] confirmed and updated if neccessary? Yes   Expected Copay ($) 0   Is the patient able to afford the medication copay? Yes   Payment Method zero copay   Days supply of Refill 28   Supplies needed? No supplies needed   Refill activity completed? Yes   Refill activity plan Refill scheduled   Shipment/Pickup Date: 10/21/22            Current Outpatient Medications   Medication Sig    abatacept (ORENCIA CLICKJECT) 125 mg/mL AtIn Inject 125 mg into the skin every 7 days.    aloe vera 5,000 mg Cap Take by mouth.    aspirin (ECOTRIN) 81 MG EC tablet Take 81 mg by  mouth once daily.     aspirin 325 MG tablet Take 325 mg by mouth.    buPROPion (WELLBUTRIN SR) 150 MG TBSR 12 hr tablet Take 150 mg by mouth 2 (two) times daily.    calcium carbonate (OS-LUCIANO) 600 mg calcium (1,500 mg) Tab Take 600 mg by mouth once daily.    cetirizine (ZYRTEC) 10 MG tablet Take 10 mg by mouth daily as needed.    cholecalciferol, vitamin D3, 125 mcg (5,000 unit) Tab Take by mouth.    cyclobenzaprine (FLEXERIL) 10 MG tablet Take 1 tablet (10 mg total) by mouth 3 (three) times daily as needed for Muscle spasms.    cycloSPORINE (RESTASIS) 0.05 % ophthalmic emulsion     diclofenac sodium (VOLTAREN) 1 % Gel Apply 2 g topically.    dicyclomine (BENTYL) 20 mg tablet Take 1 tablet (20 mg total) by mouth 4 (four) times daily.    docusate sodium (COLACE) 100 MG capsule Take 100 mg by mouth.    fluconazole (DIFLUCAN) 150 MG Tab Take 150 mg by mouth once.    folic acid-vit B6-vit B12 (FOLBEE) 2.5-25-1 mg Tab Take 1 tablet by mouth once daily.    glipiZIDE (GLUCOTROL) 5 MG tablet Take 5 mg by mouth.    guaiFENesin (MUCINEX) 600 mg 12 hr tablet Take 1,200 mg by mouth.    hydrOXYchloroQUINE (PLAQUENIL) 200 mg tablet TAKE 1 TABLET BY MOUTH ONCE DAILY    lisinopril-hydrochlorothiazide (PRINZIDE,ZESTORETIC) 10-12.5 mg per tablet Take 1 tablet by mouth.    LORazepam (ATIVAN) 0.5 MG tablet Take 0.5 mg by mouth.    miscellaneous medical supply (C-TUB) Misc Glucometer and strips#100    multivitamin (THERAGRAN) per tablet Take 1 tablet by mouth.    nystatin (MYCOSTATIN) 100,000 unit/mL suspension     ondansetron (ZOFRAN-ODT) 4 MG TbDL Take 4 mg by mouth every 8 (eight) hours as needed.     pantoprazole (PROTONIX) 40 MG tablet TAKE 1 TABLET BY MOUTH ONCE DAILY    paroxetine (PAXIL) 10 MG tablet TAKE 1 TABLET BY MOUTH EVERY EVENING    polyethylene glycol (GLYCOLAX) 17 gram/dose powder Take 17 g by mouth.    pravastatin (PRAVACHOL) 40 MG tablet Take 40 mg by mouth.    predniSONE (DELTASONE) 1 MG tablet TAKE 3 TABLETS BY MOUTH  ONCE DAILY.    pregabalin (LYRICA) 25 MG capsule TAKE 1 CAPSULE BY MOUTH THREE TIMES DAILY    promethazine (PHENERGAN) 12.5 MG Tab     traZODone (DESYREL) 100 MG tablet TAKE 1 TABLET BY MOUTH NIGHTLY    triamcinolone acetonide 0.1% (KENALOG) 0.1 % ointment Apply topically 2 (two) times daily.    valACYclovir (VALTREX) 1000 MG tablet TAKE 1 TABLET BY MOUTH THREE TIMES DAILY    zinc gluconate 50 mg tablet Take 50 mg by mouth.   Last reviewed on 8/30/2022  1:09 PM by Janie Lockett MA    Review of patient's allergies indicates:   Allergen Reactions    Cymbalta [duloxetine] Other (See Comments)     Altered mental status    Last reviewed on  8/30/2022 1:09 PM by Janie Lockett      Tasks added this encounter   11/14/2022 - Refill Call (Auto Added)   Tasks due within next 3 months   12/30/2022 - Clinical - Follow Up Assesement (Annual)     Brandi Engle, PharmD  [facility]  [address] [place] [address]  Phone: [phone]  Fax: [phone]

## 2022-11-14 ENCOUNTER — SPECIALTY PHARMACY (OUTPATIENT)
Dept: PHARMACY | Facility: CLINIC | Age: 62
End: 2022-11-14
Payer: MEDICAID

## 2022-11-14 NOTE — TELEPHONE ENCOUNTER
Specialty Pharmacy - Refill Coordination    Specialty Medication Orders Linked to Encounter      Flowsheet Row Most Recent Value   Medication #1 abatacept (ORENCIA CLICKJECT) 125 mg/mL AtIn (Order#[id number], Rx#[id number])            Refill Questions - Documented Responses      Flowsheet Row Most Recent Value   Patient Availability and HIPAA Verification    Does patient want to proceed with activity? Yes   HIPAA/medical authority confirmed? Yes   Relationship to patient of person spoken to? Self   Refill Screening Questions    Changes to allergies? No   Changes to medications? No   New conditions since last clinic visit? No   Unplanned office visit, urgent care, ED, or hospital admission in the last 4 weeks? No   How does patient/caregiver feel medication is working? Good   Financial problems or insurance changes? No   How many doses of your specialty medications were missed in the last 4 weeks? 0   Would patient like to speak to a pharmacist? No   When does the patient need to receive the medication? 11/21/22   Refill Delivery Questions    How will the patient receive the medication? MEDRx   When does the patient need to receive the medication? 11/21/22   Shipping Address Home   Address in [facility] confirmed and updated if neccessary? Yes   Expected Copay ($) 0   Is the patient able to afford the medication copay? Yes   Payment Method zero copay   Days supply of Refill 28   Supplies needed? No supplies needed   Refill activity completed? Yes   Refill activity plan Refill scheduled   Shipment/Pickup Date: 11/16/22            Current Outpatient Medications   Medication Sig    abatacept (ORENCIA CLICKJECT) 125 mg/mL AtIn Inject 125 mg into the skin every 7 days.    aloe vera 5,000 mg Cap Take by mouth.    aspirin (ECOTRIN) 81 MG EC tablet Take 81 mg by mouth once daily.     aspirin 325 MG tablet Take 325 mg by mouth.    buPROPion (WELLBUTRIN SR) 150 MG TBSR 12 hr tablet Take 150 mg by mouth 2 (two) times  daily.    calcium carbonate (OS-LUCIANO) 600 mg calcium (1,500 mg) Tab Take 600 mg by mouth once daily.    cetirizine (ZYRTEC) 10 MG tablet Take 10 mg by mouth daily as needed.    cholecalciferol, vitamin D3, 125 mcg (5,000 unit) Tab Take by mouth.    cyclobenzaprine (FLEXERIL) 10 MG tablet Take 1 tablet (10 mg total) by mouth 3 (three) times daily as needed for Muscle spasms.    cycloSPORINE (RESTASIS) 0.05 % ophthalmic emulsion     diclofenac sodium (VOLTAREN) 1 % Gel Apply 2 g topically.    dicyclomine (BENTYL) 20 mg tablet Take 1 tablet (20 mg total) by mouth 4 (four) times daily.    docusate sodium (COLACE) 100 MG capsule Take 100 mg by mouth.    fluconazole (DIFLUCAN) 150 MG Tab Take 150 mg by mouth once.    folic acid-vit B6-vit B12 (FOLBEE) 2.5-25-1 mg Tab Take 1 tablet by mouth once daily.    glipiZIDE (GLUCOTROL) 5 MG tablet Take 5 mg by mouth.    guaiFENesin (MUCINEX) 600 mg 12 hr tablet Take 1,200 mg by mouth.    hydrOXYchloroQUINE (PLAQUENIL) 200 mg tablet TAKE 1 TABLET BY MOUTH ONCE DAILY    lisinopril-hydrochlorothiazide (PRINZIDE,ZESTORETIC) 10-12.5 mg per tablet Take 1 tablet by mouth.    LORazepam (ATIVAN) 0.5 MG tablet Take 0.5 mg by mouth.    miscellaneous medical supply (C-TUB) Misc Glucometer and strips#100    multivitamin (THERAGRAN) per tablet Take 1 tablet by mouth.    nystatin (MYCOSTATIN) 100,000 unit/mL suspension     ondansetron (ZOFRAN-ODT) 4 MG TbDL Take 4 mg by mouth every 8 (eight) hours as needed.     pantoprazole (PROTONIX) 40 MG tablet TAKE 1 TABLET BY MOUTH ONCE DAILY    paroxetine (PAXIL) 10 MG tablet TAKE 1 TABLET BY MOUTH EVERY EVENING    polyethylene glycol (GLYCOLAX) 17 gram/dose powder Take 17 g by mouth.    pravastatin (PRAVACHOL) 40 MG tablet Take 40 mg by mouth.    predniSONE (DELTASONE) 1 MG tablet TAKE 3 TABLETS BY MOUTH ONCE DAILY.    pregabalin (LYRICA) 25 MG capsule TAKE 1 CAPSULE BY MOUTH THREE TIMES DAILY    promethazine (PHENERGAN) 12.5 MG Tab     traZODone (DESYREL)  100 MG tablet TAKE 1 TABLET BY MOUTH NIGHTLY    triamcinolone acetonide 0.1% (KENALOG) 0.1 % ointment Apply topically 2 (two) times daily.    valACYclovir (VALTREX) 1000 MG tablet TAKE 1 TABLET BY MOUTH THREE TIMES DAILY    zinc gluconate 50 mg tablet Take 50 mg by mouth.   Last reviewed on 8/30/2022  1:09 PM by Janie Lockett MA    Review of patient's allergies indicates:   Allergen Reactions    Cymbalta [duloxetine] Other (See Comments)     Altered mental status    Last reviewed on  8/30/2022 1:09 PM by Janie Lockett      Tasks added this encounter   12/12/2022 - Refill Call (Auto Added)   Tasks due within next 3 months   12/30/2022 - Clinical - Follow Up Assesement (Annual)     [facility]  [address]  [facility] [address]  Phone: [phone]  Fax: [phone]

## 2022-11-15 NOTE — TELEPHONE ENCOUNTER
PA approved for [employer] 10/20/22-10/20/23.     Test Claim pays $0.00    [place] Medicaid BI[place] not [place].    May proceed with refill.        Quality 110: Preventive Care And Screening: Influenza Immunization: Influenza immunization was not ordered or administered, reason not given Quality 226: Preventive Care And Screening: Tobacco Use: Screening And Cessation Intervention: Patient screened for tobacco use and is an ex/non-smoker Quality 130: Documentation Of Current Medications In The Medical Record: Current Medications Documented Detail Level: Detailed Quality 431: Preventive Care And Screening: Unhealthy Alcohol Use - Screening: Patient not identified as an unhealthy alcohol user when screened for unhealthy alcohol use using a systematic screening method

## 2022-12-12 ENCOUNTER — SPECIALTY PHARMACY (OUTPATIENT)
Dept: PHARMACY | Facility: CLINIC | Age: 62
End: 2022-12-12
Payer: MEDICAID

## 2022-12-12 NOTE — TELEPHONE ENCOUNTER
Outgoing call regarding Orencia refill. Pt stated she had injected today and has 1 dose left on hand for 12/19/22 Pt stated she delayed her dose due to her running fever last week and injected today. Pt stated its okay to follow up with her on 12/19/22 for her next injection 12/26/22.

## 2022-12-19 NOTE — TELEPHONE ENCOUNTER
Specialty Pharmacy - Refill Coordination    Specialty Medication Orders Linked to Encounter      Flowsheet Row Most Recent Value   Medication #1 abatacept (ORENCIA CLICKJECT) 125 mg/mL AtIn (Order#[id number], Rx#[id number])            Refill Questions - Documented Responses      Flowsheet Row Most Recent Value   Patient Availability and HIPAA Verification    Does patient want to proceed with activity? Yes   HIPAA/medical authority confirmed? Yes   Relationship to patient of person spoken to? Self   Refill Screening Questions    Changes to allergies? No   Changes to medications? No   New conditions since last clinic visit? No   Unplanned office visit, urgent care, ED, or hospital admission in the last 4 weeks? No   How does patient/caregiver feel medication is working? Good   Financial problems or insurance changes? No   How many doses of your specialty medications were missed in the last 4 weeks? 0   Would patient like to speak to a pharmacist? No   When does the patient need to receive the medication? 12/26/22   Refill Delivery Questions    How will the patient receive the medication? MEDRx   When does the patient need to receive the medication? 12/26/22   Shipping Address Home   Expected Copay ($) 0   Is the patient able to afford the medication copay? Yes   Payment Method zero copay   Days supply of Refill 28   Supplies needed? No supplies needed   Refill activity completed? Yes   Refill activity plan Refill scheduled   Shipment/Pickup Date: 12/20/22            Current Outpatient Medications   Medication Sig    abatacept (ORENCIA CLICKJECT) 125 mg/mL AtIn Inject 125 mg into the skin every 7 days.    aloe vera 5,000 mg Cap Take by mouth.    aspirin (ECOTRIN) 81 MG EC tablet Take 81 mg by mouth once daily.     aspirin 325 MG tablet Take 325 mg by mouth.    buPROPion (WELLBUTRIN SR) 150 MG TBSR 12 hr tablet Take 150 mg by mouth 2 (two) times daily.    calcium carbonate (OS-LUCIANO) 600 mg calcium (1,500 mg) Tab Take 600  mg by mouth once daily.    cetirizine (ZYRTEC) 10 MG tablet Take 10 mg by mouth daily as needed.    cholecalciferol, vitamin D3, 125 mcg (5,000 unit) Tab Take by mouth.    cyclobenzaprine (FLEXERIL) 10 MG tablet Take 1 tablet (10 mg total) by mouth 3 (three) times daily as needed for Muscle spasms.    cycloSPORINE (RESTASIS) 0.05 % ophthalmic emulsion     diclofenac sodium (VOLTAREN) 1 % Gel Apply 2 g topically.    dicyclomine (BENTYL) 20 mg tablet Take 1 tablet (20 mg total) by mouth 4 (four) times daily.    docusate sodium (COLACE) 100 MG capsule Take 100 mg by mouth.    fluconazole (DIFLUCAN) 150 MG Tab Take 150 mg by mouth once.    folic acid-vit B6-vit B12 (FOLBEE) 2.5-25-1 mg Tab Take 1 tablet by mouth once daily.    glipiZIDE (GLUCOTROL) 5 MG tablet Take 5 mg by mouth.    guaiFENesin (MUCINEX) 600 mg 12 hr tablet Take 1,200 mg by mouth.    hydrOXYchloroQUINE (PLAQUENIL) 200 mg tablet TAKE 1 TABLET BY MOUTH ONCE DAILY    lisinopril-hydrochlorothiazide (PRINZIDE,ZESTORETIC) 10-12.5 mg per tablet Take 1 tablet by mouth.    LORazepam (ATIVAN) 0.5 MG tablet Take 0.5 mg by mouth.    miscellaneous medical supply (C-TUB) Misc Glucometer and strips#100    multivitamin (THERAGRAN) per tablet Take 1 tablet by mouth.    nystatin (MYCOSTATIN) 100,000 unit/mL suspension     ondansetron (ZOFRAN-ODT) 4 MG TbDL Take 4 mg by mouth every 8 (eight) hours as needed.     pantoprazole (PROTONIX) 40 MG tablet TAKE 1 TABLET BY MOUTH ONCE DAILY    paroxetine (PAXIL) 10 MG tablet TAKE 1 TABLET BY MOUTH EVERY EVENING    polyethylene glycol (GLYCOLAX) 17 gram/dose powder Take 17 g by mouth.    pravastatin (PRAVACHOL) 40 MG tablet Take 40 mg by mouth.    predniSONE (DELTASONE) 1 MG tablet TAKE 3 TABLETS BY MOUTH ONCE DAILY.    pregabalin (LYRICA) 25 MG capsule TAKE 1 CAPSULE BY MOUTH THREE TIMES DAILY    promethazine (PHENERGAN) 12.5 MG Tab     traZODone (DESYREL) 100 MG tablet TAKE 1 TABLET BY MOUTH NIGHTLY    triamcinolone acetonide  0.1% (KENALOG) 0.1 % ointment Apply topically 2 (two) times daily.    valACYclovir (VALTREX) 1000 MG tablet TAKE 1 TABLET BY MOUTH THREE TIMES DAILY    zinc gluconate 50 mg tablet Take 50 mg by mouth.   Last reviewed on 8/30/2022  1:09 PM by Janie Lockett MA    Review of patient's allergies indicates:   Allergen Reactions    Cymbalta [duloxetine] Other (See Comments)     Altered mental status    Last reviewed on  8/30/2022 1:09 PM by Janie Lockett      Tasks added this encounter   1/16/2023 - Refill Call (Auto Added)   Tasks due within next 3 months   12/30/2022 - Clinical - Follow Up Assesement (Annual)     [facility]  [address] [place] [id number]  Phone: [phone]  Fax: [phone]

## 2022-12-22 ENCOUNTER — PATIENT MESSAGE (OUTPATIENT)
Dept: RHEUMATOLOGY | Facility: CLINIC | Age: 62
End: 2022-12-22
Payer: MEDICAID

## 2022-12-23 RX ORDER — CYCLOBENZAPRINE HCL 10 MG
10 TABLET ORAL 3 TIMES DAILY PRN
Qty: 90 TABLET | Refills: 5 | Status: SHIPPED | OUTPATIENT
Start: 2022-12-23 | End: 2023-01-02

## 2022-12-30 ENCOUNTER — SPECIALTY PHARMACY (OUTPATIENT)
Dept: PHARMACY | Facility: CLINIC | Age: 62
End: 2022-12-30
Payer: MEDICAID

## 2022-12-30 DIAGNOSIS — M06.00 SERONEGATIVE RHEUMATOID ARTHRITIS: Primary | ICD-10-CM

## 2022-12-30 NOTE — TELEPHONE ENCOUNTER
Specialty Pharmacy - Clinical Reassessment    Specialty Medication Orders Linked to Encounter      Flowsheet Row Most Recent Value   Medication #1 abatacept (ORENCIA CLICKJECT) 125 mg/mL AtIn (Order#[id number], Rx#[id number])          Patient Diagnosis   M06.00 - Seronegative rheumatoid arthritis    Malu Holland is a 62 y.o. female, who is followed by the specialty pharmacy service for management and education of her Orencia.  She has been on therapy with Orencia for 9 months.  I have reviewed her electronic medical record and current medication list and determined that specialty medication adjustment Is not needed at this time.    Patient has not experienced adverse events.  She Is adherent reporting 2 missed doses since last review, accounted for and counseled by pharmacist.  Adherence has been encouraged with the following mechanism(s): proactive refill calls.  She is meeting goals of therapy and will continue treatment.        12/19/2022 11/14/2022 10/21/2022 9/8/2022 8/11/2022 7/14/2022 6/16/2022   Follow Up Review   # of missed doses 0 0 1 1 0 0 0   Reason   --        Pt will inject on monday and adjust calendar-needed refill then PA    --        needed refill and PA [place] ill or sick      New Medications? No No No No No No No   New Conditions? No No No No No No No   New Allergies? No No No No No No No   Med Effective? Good Good Good Very good Very good Good Good   Urgent Care? No No No No No No No   Requested Pharmacist? No No No No No No No       Multiple values from one day are sorted in reverse-chronological order         Therapy is appropriate to continue.    Therapy is effective: Yes  On scale of 1 to 10, how does patient rank quality of life? (10 - Best): Unable to Assess  Recommendations: none at this time.  Review Method: Chart Review    Tasks added this encounter   No tasks added.   Tasks due within next 3 months   12/30/2022 - Clinical - Follow Up Assesement (Annual)  1/16/2023 - Refill Call (Auto  Added)     Angelina Conteh, PharmD  [facility]  [address]  [facility] [address]  Phone: [phone]  Fax: [phone]

## 2023-01-04 DIAGNOSIS — M35.00 SJOGREN'S SYNDROME, WITH UNSPECIFIED ORGAN INVOLVEMENT: ICD-10-CM

## 2023-01-04 RX ORDER — CYCLOBENZAPRINE HCL 10 MG
10 TABLET ORAL 3 TIMES DAILY PRN
Qty: 90 TABLET | Refills: 5 | Status: SHIPPED | OUTPATIENT
Start: 2023-01-04 | End: 2024-01-22 | Stop reason: SDUPTHER

## 2023-01-17 ENCOUNTER — SPECIALTY PHARMACY (OUTPATIENT)
Dept: PHARMACY | Facility: CLINIC | Age: 63
End: 2023-01-17
Payer: MEDICAID

## 2023-01-17 NOTE — TELEPHONE ENCOUNTER
Specialty Pharmacy - Refill Coordination    Specialty Medication Orders Linked to Encounter      Flowsheet Row Most Recent Value   Medication #1 abatacept (ORENCIA CLICKJECT) 125 mg/mL AtIn (Order#[id number], Rx#[id number])            Refill Questions - Documented Responses      Flowsheet Row Most Recent Value   Patient Availability and HIPAA Verification    Does patient want to proceed with activity? Yes   HIPAA/medical authority confirmed? Yes   Relationship to patient of person spoken to? Self   Refill Screening Questions    Changes to allergies? No   Changes to medications? No   New conditions since last clinic visit? No   Unplanned office visit, urgent care, ED, or hospital admission in the last 4 weeks? No   How does patient/caregiver feel medication is working? Fair   Financial problems or insurance changes? No   How many doses of your specialty medications were missed in the last 4 weeks? 0   Would patient like to speak to a pharmacist? No   When does the patient need to receive the medication? 01/30/23   Refill Delivery Questions    How will the patient receive the medication? MEDRx   When does the patient need to receive the medication? 01/30/23   Shipping Address Home   Address in [facility] confirmed and updated if neccessary? Yes   Expected Copay ($) 0   Is the patient able to afford the medication copay? Yes   Payment Method zero copay   Days supply of Refill 28   Supplies needed? No supplies needed   Refill activity completed? No   Refill activity plan Refill scheduled   Shipment/Pickup Date: 01/24/23            Current Outpatient Medications   Medication Sig    abatacept (ORENCIA CLICKJECT) 125 mg/mL AtIn Inject 125 mg into the skin every 7 days.    aloe vera 5,000 mg Cap Take by mouth.    aspirin (ECOTRIN) 81 MG EC tablet Take 81 mg by mouth once daily.     aspirin 325 MG tablet Take 325 mg by mouth.    buPROPion (WELLBUTRIN SR) 150 MG TBSR 12 hr tablet Take 150 mg by mouth 2 (two) times  daily.    calcium carbonate (OS-LUCIANO) 600 mg calcium (1,500 mg) Tab Take 600 mg by mouth once daily.    cetirizine (ZYRTEC) 10 MG tablet Take 10 mg by mouth daily as needed.    cholecalciferol, vitamin D3, 125 mcg (5,000 unit) Tab Take by mouth.    cyclobenzaprine (FLEXERIL) 10 MG tablet Take 1 tablet (10 mg total) by mouth 3 (three) times daily as needed for Muscle spasms.    cycloSPORINE (RESTASIS) 0.05 % ophthalmic emulsion     diclofenac sodium (VOLTAREN) 1 % Gel Apply 2 g topically.    dicyclomine (BENTYL) 20 mg tablet Take 1 tablet (20 mg total) by mouth 4 (four) times daily.    docusate sodium (COLACE) 100 MG capsule Take 100 mg by mouth.    fluconazole (DIFLUCAN) 150 MG Tab Take 150 mg by mouth once.    folic acid-vit B6-vit B12 (FOLBEE) 2.5-25-1 mg Tab Take 1 tablet by mouth once daily.    glipiZIDE (GLUCOTROL) 5 MG tablet Take 5 mg by mouth.    guaiFENesin (MUCINEX) 600 mg 12 hr tablet Take 1,200 mg by mouth.    hydrOXYchloroQUINE (PLAQUENIL) 200 mg tablet TAKE 1 TABLET BY MOUTH ONCE DAILY    lisinopril-hydrochlorothiazide (PRINZIDE,ZESTORETIC) 10-12.5 mg per tablet Take 1 tablet by mouth.    LORazepam (ATIVAN) 0.5 MG tablet Take 0.5 mg by mouth.    miscellaneous medical supply (C-TUB) Misc Glucometer and strips#100    multivitamin (THERAGRAN) per tablet Take 1 tablet by mouth.    nystatin (MYCOSTATIN) 100,000 unit/mL suspension     ondansetron (ZOFRAN-ODT) 4 MG TbDL Take 4 mg by mouth every 8 (eight) hours as needed.     pantoprazole (PROTONIX) 40 MG tablet TAKE 1 TABLET BY MOUTH ONCE DAILY    paroxetine (PAXIL) 10 MG tablet TAKE 1 TABLET BY MOUTH EVERY EVENING    polyethylene glycol (GLYCOLAX) 17 gram/dose powder Take 17 g by mouth.    pravastatin (PRAVACHOL) 40 MG tablet Take 40 mg by mouth.    predniSONE (DELTASONE) 1 MG tablet TAKE 3 TABLETS BY MOUTH ONCE DAILY.    pregabalin (LYRICA) 25 MG capsule TAKE 1 CAPSULE BY MOUTH THREE TIMES DAILY    promethazine (PHENERGAN) 12.5 MG Tab     traZODone (DESYREL)  100 MG tablet TAKE 1 TABLET BY MOUTH NIGHTLY    triamcinolone acetonide 0.1% (KENALOG) 0.1 % ointment Apply topically 2 (two) times daily.    valACYclovir (VALTREX) 1000 MG tablet TAKE 1 TABLET BY MOUTH THREE TIMES DAILY    zinc gluconate 50 mg tablet Take 50 mg by mouth.   Last reviewed on 12/30/2022 11:34 AM by Angelina Conteh PharmD    Review of patient's allergies indicates:   Allergen Reactions    Cymbalta [duloxetine] Other (See Comments)     Altered mental status    Last reviewed on  1/4/2023 10:04 AM by Almaz Lehman      Tasks added this encounter   No tasks added.   Tasks due within next 3 months   1/16/2023 - Refill Call (Auto Added)     Angelina Conteh, PharmD  [facility]  [address] [place] [id number]  Phone: [phone]  Fax: [phone]

## 2023-01-18 DIAGNOSIS — R76.8 ANA POSITIVE: ICD-10-CM

## 2023-01-18 DIAGNOSIS — B37.0 THRUSH OF MOUTH AND ESOPHAGUS: ICD-10-CM

## 2023-01-18 DIAGNOSIS — D69.3 ACUTE ITP: ICD-10-CM

## 2023-01-18 DIAGNOSIS — M35.1 MCTD (MIXED CONNECTIVE TISSUE DISEASE): ICD-10-CM

## 2023-01-18 DIAGNOSIS — M45.9 ANKYLOSING SPONDYLITIS, UNSPECIFIED SITE OF SPINE: ICD-10-CM

## 2023-01-18 DIAGNOSIS — M47.12 CERVICAL ARTHRITIS WITH MYELOPATHY: ICD-10-CM

## 2023-01-18 DIAGNOSIS — M48.02 CERVICAL SPINAL STENOSIS: ICD-10-CM

## 2023-01-18 DIAGNOSIS — M35.00 SJOGREN'S SYNDROME, WITH UNSPECIFIED ORGAN INVOLVEMENT: ICD-10-CM

## 2023-01-18 DIAGNOSIS — M48.062 SPINAL STENOSIS OF LUMBAR REGION WITH NEUROGENIC CLAUDICATION: ICD-10-CM

## 2023-01-18 DIAGNOSIS — G89.4 CHRONIC PAIN SYNDROME: ICD-10-CM

## 2023-01-18 DIAGNOSIS — B37.81 THRUSH OF MOUTH AND ESOPHAGUS: ICD-10-CM

## 2023-01-18 DIAGNOSIS — E06.3 HASHIMOTO'S THYROIDITIS: ICD-10-CM

## 2023-01-18 DIAGNOSIS — K21.9 GERD WITHOUT ESOPHAGITIS: ICD-10-CM

## 2023-01-19 RX ORDER — HYDROXYCHLOROQUINE SULFATE 200 MG/1
TABLET, FILM COATED ORAL
Qty: 30 TABLET | Refills: 6 | Status: SHIPPED | OUTPATIENT
Start: 2023-01-19 | End: 2023-09-28 | Stop reason: SDUPTHER

## 2023-01-19 RX ORDER — PREGABALIN 25 MG/1
CAPSULE ORAL
Qty: 90 CAPSULE | Refills: 3 | Status: SHIPPED | OUTPATIENT
Start: 2023-01-19 | End: 2023-05-23

## 2023-02-20 ENCOUNTER — SPECIALTY PHARMACY (OUTPATIENT)
Dept: PHARMACY | Facility: CLINIC | Age: 63
End: 2023-02-20
Payer: MEDICAID

## 2023-02-20 NOTE — TELEPHONE ENCOUNTER
Specialty Pharmacy - Refill Coordination    Specialty Medication Orders Linked to Encounter      Flowsheet Row Most Recent Value   Medication #1 abatacept (ORENCIA CLICKJECT) 125 mg/mL AtIn (Order#[id number], Rx#[id number])            Refill Questions - Documented Responses      Flowsheet Row Most Recent Value   Patient Availability and HIPAA Verification    Does patient want to proceed with activity? Yes   HIPAA/medical authority confirmed? Yes   Relationship to patient of person spoken to? Self   Refill Screening Questions    Changes to allergies? No   Changes to medications? No   New conditions since last clinic visit? No   Unplanned office visit, urgent care, ED, or hospital admission in the last 4 weeks? No   How does patient/caregiver feel medication is working? Fair   Financial problems or insurance changes? No   How many doses of your specialty medications were missed in the last 4 weeks? 0   Would patient like to speak to a pharmacist? No   When does the patient need to receive the medication? 02/27/23   Refill Delivery Questions    How will the patient receive the medication? MEDRx   When does the patient need to receive the medication? 02/27/23   Shipping Address Home   Address in [place] confirmed and updated if neccessary? Yes   Expected Copay ($) 0   Is the patient able to afford the medication copay? Yes   Payment Method zero copay   Days supply of Refill 28   Supplies needed? No supplies needed   Refill activity completed? Yes   Refill activity plan Refill scheduled   Shipment/Pickup Date: 02/23/23            Current Outpatient Medications   Medication Sig    abatacept (ORENCIA CLICKJECT) 125 mg/mL AtIn Inject 125 mg into the skin every 7 days.    aloe vera 5,000 mg Cap Take by mouth.    aspirin (ECOTRIN) 81 MG EC tablet Take 81 mg by mouth once daily.     aspirin 325 MG tablet Take 325 mg by mouth.    buPROPion (WELLBUTRIN SR) 150 MG TBSR 12 hr tablet Take 150 mg by mouth 2 (two) times  daily.    calcium carbonate (OS-LUCIANO) 600 mg calcium (1,500 mg) Tab Take 600 mg by mouth once daily.    cetirizine (ZYRTEC) 10 MG tablet Take 10 mg by mouth daily as needed.    cholecalciferol, vitamin D3, 125 mcg (5,000 unit) Tab Take by mouth.    cyclobenzaprine (FLEXERIL) 10 MG tablet Take 1 tablet (10 mg total) by mouth 3 (three) times daily as needed for Muscle spasms.    cycloSPORINE (RESTASIS) 0.05 % ophthalmic emulsion     diclofenac sodium (VOLTAREN) 1 % Gel Apply 2 g topically.    dicyclomine (BENTYL) 20 mg tablet Take 1 tablet (20 mg total) by mouth 4 (four) times daily.    docusate sodium (COLACE) 100 MG capsule Take 100 mg by mouth.    fluconazole (DIFLUCAN) 150 MG Tab Take 150 mg by mouth once.    folic acid-vit B6-vit B12 (FOLBEE) 2.5-25-1 mg Tab Take 1 tablet by mouth once daily.    glipiZIDE (GLUCOTROL) 5 MG tablet Take 5 mg by mouth.    guaiFENesin (MUCINEX) 600 mg 12 hr tablet Take 1,200 mg by mouth.    hydrOXYchloroQUINE (PLAQUENIL) 200 mg tablet TAKE ONE TABLET BY MOUTH ONCE DAILY    lisinopril-hydrochlorothiazide (PRINZIDE,ZESTORETIC) 10-12.5 mg per tablet Take 1 tablet by mouth.    LORazepam (ATIVAN) 0.5 MG tablet Take 0.5 mg by mouth.    miscellaneous medical supply (C-TUB) Misc Glucometer and strips#100    multivitamin (THERAGRAN) per tablet Take 1 tablet by mouth.    nystatin (MYCOSTATIN) 100,000 unit/mL suspension     ondansetron (ZOFRAN-ODT) 4 MG TbDL Take 4 mg by mouth every 8 (eight) hours as needed.     pantoprazole (PROTONIX) 40 MG tablet Take 1 tablet (40 mg total) by mouth once daily.    paroxetine (PAXIL) 10 MG tablet Take 1 tablet (10 mg total) by mouth every evening.    polyethylene glycol (GLYCOLAX) 17 gram/dose powder Take 17 g by mouth.    pravastatin (PRAVACHOL) 40 MG tablet Take 40 mg by mouth.    predniSONE (DELTASONE) 1 MG tablet TAKE 3 TABLETS BY MOUTH ONCE DAILY.    pregabalin (LYRICA) 25 MG capsule TAKE ONE CAPSULE BY MOUTH THREE TIMES DAILY    promethazine (PHENERGAN)  12.5 MG Tab     traZODone (DESYREL) 100 MG tablet TAKE 1 TABLET BY MOUTH NIGHTLY    triamcinolone acetonide 0.1% (KENALOG) 0.1 % ointment Apply topically 2 (two) times daily.    valACYclovir (VALTREX) 1000 MG tablet TAKE 1 TABLET BY MOUTH THREE TIMES DAILY    zinc gluconate 50 mg tablet Take 50 mg by mouth.   Last reviewed on 12/30/2022 11:34 AM by Angelina Conteh, PharmD    Review of patient's allergies indicates:   Allergen Reactions    Cymbalta [duloxetine] Other (See Comments)     Altered mental status    Last reviewed on  1/18/2023 4:12 PM by Almaz Lehman      Tasks added this encounter   3/20/2023 - Refill Call (Auto Added)   Tasks due within next 3 months   No tasks due.     [facility]  [address] [facility] [address]  Phone: [phone]  Fax: [phone]

## 2023-02-28 ENCOUNTER — OFFICE VISIT (OUTPATIENT)
Dept: RHEUMATOLOGY | Facility: CLINIC | Age: 63
End: 2023-02-28
Payer: MEDICAID

## 2023-02-28 VITALS
WEIGHT: 112 LBS | DIASTOLIC BLOOD PRESSURE: 78 MMHG | HEART RATE: 79 BPM | BODY MASS INDEX: 21.99 KG/M2 | HEIGHT: 60 IN | SYSTOLIC BLOOD PRESSURE: 158 MMHG

## 2023-02-28 DIAGNOSIS — R42 DIZZINESS: ICD-10-CM

## 2023-02-28 DIAGNOSIS — R41.3 OTHER AMNESIA: ICD-10-CM

## 2023-02-28 DIAGNOSIS — M35.00 SJOGREN'S SYNDROME, WITH UNSPECIFIED ORGAN INVOLVEMENT: ICD-10-CM

## 2023-02-28 DIAGNOSIS — D69.6 THROMBOCYTOPENIA: ICD-10-CM

## 2023-02-28 DIAGNOSIS — M45.9 ANKYLOSING SPONDYLITIS, UNSPECIFIED SITE OF SPINE: Primary | ICD-10-CM

## 2023-02-28 DIAGNOSIS — D72.819 LEUKOPENIA, UNSPECIFIED TYPE: ICD-10-CM

## 2023-02-28 DIAGNOSIS — N93.9 VAGINAL BLEEDING: ICD-10-CM

## 2023-02-28 DIAGNOSIS — K59.00 CONSTIPATION, UNSPECIFIED CONSTIPATION TYPE: ICD-10-CM

## 2023-02-28 PROCEDURE — 1159F PR MEDICATION LIST DOCUMENTED IN MEDICAL RECORD: ICD-10-PCS | Mod: CPTII,,, | Performed by: PHYSICIAN ASSISTANT

## 2023-02-28 PROCEDURE — 1160F RVW MEDS BY RX/DR IN RCRD: CPT | Mod: CPTII,,, | Performed by: PHYSICIAN ASSISTANT

## 2023-02-28 PROCEDURE — 99215 PR OFFICE/OUTPT VISIT, EST, LEVL V, 40-54 MIN: ICD-10-PCS | Mod: 25,S$PBB,, | Performed by: PHYSICIAN ASSISTANT

## 2023-02-28 PROCEDURE — 3078F DIAST BP <80 MM HG: CPT | Mod: CPTII,,, | Performed by: PHYSICIAN ASSISTANT

## 2023-02-28 PROCEDURE — 4010F ACE/ARB THERAPY RXD/TAKEN: CPT | Mod: CPTII,,, | Performed by: PHYSICIAN ASSISTANT

## 2023-02-28 PROCEDURE — 99999 PR PBB SHADOW E&M-EST. PATIENT-LVL V: ICD-10-PCS | Mod: PBBFAC,,, | Performed by: PHYSICIAN ASSISTANT

## 2023-02-28 PROCEDURE — 3077F SYST BP >= 140 MM HG: CPT | Mod: CPTII,,, | Performed by: PHYSICIAN ASSISTANT

## 2023-02-28 PROCEDURE — 1160F PR REVIEW ALL MEDS BY PRESCRIBER/CLIN PHARMACIST DOCUMENTED: ICD-10-PCS | Mod: CPTII,,, | Performed by: PHYSICIAN ASSISTANT

## 2023-02-28 PROCEDURE — 3078F PR MOST RECENT DIASTOLIC BLOOD PRESSURE < 80 MM HG: ICD-10-PCS | Mod: CPTII,,, | Performed by: PHYSICIAN ASSISTANT

## 2023-02-28 PROCEDURE — 99215 OFFICE O/P EST HI 40 MIN: CPT | Mod: 25,S$PBB,, | Performed by: PHYSICIAN ASSISTANT

## 2023-02-28 PROCEDURE — 99999 PR PBB SHADOW E&M-EST. PATIENT-LVL V: CPT | Mod: PBBFAC,,, | Performed by: PHYSICIAN ASSISTANT

## 2023-02-28 PROCEDURE — 99215 OFFICE O/P EST HI 40 MIN: CPT | Mod: PBBFAC,PN | Performed by: PHYSICIAN ASSISTANT

## 2023-02-28 PROCEDURE — 4010F PR ACE/ARB THEARPY RXD/TAKEN: ICD-10-PCS | Mod: CPTII,,, | Performed by: PHYSICIAN ASSISTANT

## 2023-02-28 PROCEDURE — 96372 THER/PROPH/DIAG INJ SC/IM: CPT | Mod: PBBFAC,PN

## 2023-02-28 PROCEDURE — 1159F MED LIST DOCD IN RCRD: CPT | Mod: CPTII,,, | Performed by: PHYSICIAN ASSISTANT

## 2023-02-28 PROCEDURE — 3008F BODY MASS INDEX DOCD: CPT | Mod: CPTII,,, | Performed by: PHYSICIAN ASSISTANT

## 2023-02-28 PROCEDURE — 3077F PR MOST RECENT SYSTOLIC BLOOD PRESSURE >= 140 MM HG: ICD-10-PCS | Mod: CPTII,,, | Performed by: PHYSICIAN ASSISTANT

## 2023-02-28 PROCEDURE — 3008F PR BODY MASS INDEX (BMI) DOCUMENTED: ICD-10-PCS | Mod: CPTII,,, | Performed by: PHYSICIAN ASSISTANT

## 2023-02-28 RX ORDER — PREDNISONE 1 MG/1
3 TABLET ORAL DAILY PRN
Qty: 90 TABLET | Refills: 1 | Status: SHIPPED | OUTPATIENT
Start: 2023-02-28

## 2023-02-28 RX ORDER — METHYLPREDNISOLONE ACETATE 80 MG/ML
80 INJECTION, SUSPENSION INTRA-ARTICULAR; INTRALESIONAL; INTRAMUSCULAR; SOFT TISSUE
Status: COMPLETED | OUTPATIENT
Start: 2023-02-28 | End: 2023-02-28

## 2023-02-28 RX ORDER — KETOROLAC TROMETHAMINE 30 MG/ML
60 INJECTION, SOLUTION INTRAMUSCULAR; INTRAVENOUS
Status: COMPLETED | OUTPATIENT
Start: 2023-02-28 | End: 2023-02-28

## 2023-02-28 RX ORDER — HYDROCODONE BITARTRATE AND ACETAMINOPHEN 10; 325 MG/1; MG/1
1 TABLET ORAL 2 TIMES DAILY
COMMUNITY
End: 2023-09-28

## 2023-02-28 RX ORDER — CYANOCOBALAMIN 1000 UG/ML
1000 INJECTION, SOLUTION INTRAMUSCULAR; SUBCUTANEOUS
Status: COMPLETED | OUTPATIENT
Start: 2023-02-28 | End: 2023-02-28

## 2023-02-28 RX ORDER — POLYETHYLENE GLYCOL 3350 17 G/17G
17 POWDER, FOR SOLUTION ORAL DAILY
Qty: 100 EACH | Refills: 3 | Status: SHIPPED | OUTPATIENT
Start: 2023-02-28

## 2023-02-28 RX ADMIN — CYANOCOBALAMIN 1000 MCG: 1000 INJECTION, SOLUTION INTRAMUSCULAR at 04:02

## 2023-02-28 RX ADMIN — METHYLPREDNISOLONE ACETATE 80 MG: 80 INJECTION, SUSPENSION INTRA-ARTICULAR; INTRALESIONAL; INTRAMUSCULAR; SOFT TISSUE at 04:02

## 2023-02-28 RX ADMIN — KETOROLAC TROMETHAMINE 60 MG: 60 INJECTION, SOLUTION INTRAMUSCULAR at 04:02

## 2023-02-28 NOTE — PATIENT INSTRUCTIONS
Check labs soon, MRI brain ordered    Hematology referral, Gynecology referral    Increase lyrica 25 mg three times daily    Start prednisone 3 mg daily as needed for 4-5 days for arthritis flare    Continue Orencia for now

## 2023-02-28 NOTE — LETTER
[facility] - [facility]umatology  [address] [place] [address]  Phone: [phone]  Fax: [phone] February 28, 2023    Malu Holland  [address] [place] [address]      To Whom It May Concern:    Malu Holland is unable to participate in jury duty due to MCTD (mixed connective tissue disease),PSA (psoriatic arthritis),  Ankylosing Spondylitis. She is currently being treated by myself for these conditions. Mrs Holland is currently taking a medication that suppresses her immune system, she should not be in large crowds for extended period of time. Her health conditions cause severe joint pain and stiffness. It is recommended that she not sit or stand no longer than 10 minutes before lying supine. Please excuse her from current jury duty as well as any future subponea.    If you have any questions or concerns, please feel free to call my office.    Sincerely,        Kiki Wall PA-C

## 2023-02-28 NOTE — PROGRESS NOTES
2 pt identifiers used  Allergies reviewed      Administered 2 cc ( 30 mg/ml ) of toradol to the left upper outer gluteal. Patient tolerated injections well. Informed of s/s to report verbalized understanding. No adverse reactions noted. Left facility in stable condition.    Administered 1 cc ( 80 mg/ml ) of depomedrol to the right upper outer gluteal. Informed of s/s to report verbalized understanding. No adverse reactions noted.    Administered 1 cc ( 1000 mcg/ml ) of b12 to the right arm. Informed of s/s to report verbalized understanding. No adverse reactions noted.

## 2023-03-01 ENCOUNTER — PATIENT MESSAGE (OUTPATIENT)
Dept: RHEUMATOLOGY | Facility: CLINIC | Age: 63
End: 2023-03-01
Payer: MEDICAID

## 2023-03-01 ENCOUNTER — DOCUMENTATION ONLY (OUTPATIENT)
Dept: PHARMACY | Facility: CLINIC | Age: 63
End: 2023-03-01
Payer: MEDICAID

## 2023-03-01 DIAGNOSIS — G89.4 CHRONIC PAIN SYNDROME: ICD-10-CM

## 2023-03-01 DIAGNOSIS — M45.9 ANKYLOSING SPONDYLITIS, UNSPECIFIED SITE OF SPINE: Primary | ICD-10-CM

## 2023-03-01 RX ORDER — SECUKINUMAB 150 MG/ML
150 INJECTION SUBCUTANEOUS
Qty: 1 ML | Refills: 11 | Status: SHIPPED | OUTPATIENT
Start: 2023-03-01 | End: 2024-03-20 | Stop reason: SDUPTHER

## 2023-03-01 RX ORDER — SECUKINUMAB 150 MG/ML
150 INJECTION SUBCUTANEOUS WEEKLY
Qty: 4 ML | Refills: 0 | Status: SHIPPED | OUTPATIENT
Start: 2023-03-01 | End: 2023-03-13 | Stop reason: ALTCHOICE

## 2023-03-01 RX ORDER — HYDROCODONE BITARTRATE AND ACETAMINOPHEN 10; 325 MG/1; MG/1
1 TABLET ORAL EVERY 8 HOURS PRN
Qty: 90 TABLET | Refills: 0 | Status: SHIPPED | OUTPATIENT
Start: 2023-03-01 | End: 2023-09-28

## 2023-03-01 RX ORDER — HYDROCODONE BITARTRATE AND ACETAMINOPHEN 10; 325 MG/1; MG/1
1 TABLET ORAL EVERY 8 HOURS PRN
Qty: 90 TABLET | Refills: 0 | Status: SHIPPED | OUTPATIENT
Start: 2023-04-01 | End: 2023-09-28

## 2023-03-01 NOTE — PROGRESS NOTES
Subjective:       Patient ID: Malu Holland is a 62 y.o. female.    Chief Complaint: Disease Management    Mrs. Holland is a 62 year old female who presents to clinic for follow up on ankylosing spondylitis and sjgoren's syndrome. She is a new patient to me. She has been on Orencia weekly since 3/2022. She reports increased pain in her shoulders, ribs, clavicle, and sternum that is reproducible w/palpation. She does not feel Orencia is working well at this time she has experienced more intense pain. She bent forward and fell over and landed on her buttock and reports increased R low back pain. Hands are drawing up.     She complains of memory concerns and difficulty with word finding and short term memory loss. Her family members have noticed this change in her. MRI brain ordered by Dr. Prieto previously, but never completed.    She denies rash. Complains of fevers  x 5-1 years.     Review of Systems   Constitutional:  Positive for activity change and fatigue. Negative for appetite change, chills and fever.   Eyes:  Negative for visual disturbance.   Respiratory:  Negative for cough and shortness of breath.    Cardiovascular:  Positive for chest pain (atypical chest wall pain). Negative for palpitations and leg swelling.   Gastrointestinal:  Negative for abdominal pain, constipation, diarrhea, nausea and vomiting.   Musculoskeletal:  Positive for arthralgias, back pain, gait problem, joint swelling, myalgias, neck pain and neck stiffness.   Neurological:  Positive for weakness and headaches. Negative for dizziness and light-headedness.        Memory loss   Psychiatric/Behavioral:  Positive for dysphoric mood.        Objective:     Vitals:    23 1404   BP: (!) 158/78   Pulse: 79       Past Medical History:   Diagnosis Date    Acid reflux     Diabetes mellitus     Dry mouth     Hypertension      Past Surgical History:   Procedure Laterality Date    ADENOIDECTOMY       SECTION      CHOLECYSTECTOMY       COLON SURGERY      HYSTERECTOMY      LASIK      TONSILLECTOMY            Physical Exam   Constitutional: She is oriented to person, place, and time.   Eyes: Right conjunctiva is not injected. Left conjunctiva is not injected.   Neck: No JVD present. No thyromegaly present.   Cardiovascular: Normal rate.   Pulmonary/Chest: Effort normal.   Musculoskeletal:      Right shoulder: Normal.      Left shoulder: Normal.      Right elbow: Normal.      Left elbow: Normal.      Right wrist: Normal.      Left wrist: Normal.      Right knee: Normal.      Left knee: Normal.   Lymphadenopathy:     She has no cervical adenopathy.   Neurological: She is alert and oriented to person, place, and time. Gait normal.   Skin: No rash noted.   Psychiatric: Mood and affect normal.       Right Side Rheumatological Exam     Examination finds the shoulder, elbow, wrist, knee, 1st MCP, 2nd MCP, 3rd MCP, 4th MCP and 5th MCP normal.    The patient is tender to palpation of the 1st PIP, 2nd PIP, 3rd PIP, 4th PIP and 5th PIP    The patient has an enlarged 1st PIP, 2nd PIP, 3rd PIP, 4th PIP and 5th PIP    Left Side Rheumatological Exam     Examination finds the shoulder, elbow, wrist, knee, 1st MCP, 2nd MCP, 3rd MCP, 4th MCP and 5th MCP normal.    The patient is tender to palpation of the 1st PIP, 2nd PIP, 3rd PIP, 4th PIP and 5th PIP.    The patient has an enlarged 1st PIP, 2nd PIP, 3rd PIP, 4th PIP and 5th PIP.      Back/Neck Exam   Tenderness Right paramedian tenderness of the Lower C-Spine, Upper T-Spine, Lower L-Spine, Upper L-Spine and Lower T-Spine.Left paramedian tenderness of the Lower C-Spine, Upper T-Spine, Lower T-Spine, Upper L-Spine and Lower L-Spine.        Assessment:       1. Ankylosing spondylitis, unspecified site of spine    2. Sjogren's syndrome, with unspecified organ involvement    3. Other amnesia    4. Vaginal bleeding    5. Dizziness    6. Leukopenia, unspecified type    7. Thrombocytopenia    8. Constipation, unspecified  constipation type              Plan:       Ankylosing spondylitis, unspecified site of spine  -     CBC Auto Differential; Future; Expected date: 02/28/2023  -     Comprehensive Metabolic Panel; Future; Expected date: 02/28/2023  -     C-Reactive Protein; Future; Expected date: 02/28/2023  -     Sedimentation rate; Future; Expected date: 02/28/2023  -     ketorolac injection 60 mg  -     methylPREDNISolone acetate injection 80 mg  -     cyanocobalamin injection 1,000 mcg  -     predniSONE (DELTASONE) 1 MG tablet; Take 3 tablets (3 mg total) by mouth daily as needed (arthritis flare).  Dispense: 90 tablet; Refill: 1  -     secukinumab (COSENTYX PEN) 150 mg/mL PnIj; Inject 150 mg into the skin once a week.  Dispense: 4 mL; Refill: 0  -     secukinumab (COSENTYX PEN) 150 mg/mL PnIj; Inject 150 mg into the skin every 28 days.  Dispense: 1 mL; Refill: 11    Sjogren's syndrome, with unspecified organ involvement  -     CBC Auto Differential; Future; Expected date: 02/28/2023  -     Comprehensive Metabolic Panel; Future; Expected date: 02/28/2023  -     C-Reactive Protein; Future; Expected date: 02/28/2023  -     Sedimentation rate; Future; Expected date: 02/28/2023  -     predniSONE (DELTASONE) 1 MG tablet; Take 3 tablets (3 mg total) by mouth daily as needed (arthritis flare).  Dispense: 90 tablet; Refill: 1    Other amnesia  -     Vitamin B12; Future; Expected date: 02/28/2023  -     MRI Brain Without Contrast; Future; Expected date: 02/28/2023    Vaginal bleeding  -     Ambulatory referral/consult to Gynecology; Future; Expected date: 03/07/2023    Dizziness  -     Vitamin B12; Future; Expected date: 02/28/2023  -     MRI Brain Without Contrast; Future; Expected date: 02/28/2023    Leukopenia, unspecified type  -     Ambulatory referral/consult to Hematology / Oncology; Future; Expected date: 03/07/2023    Thrombocytopenia  -     Ambulatory referral/consult to Hematology / Oncology; Future; Expected date:  03/07/2023    Constipation, unspecified constipation type  -     polyethylene glycol (GLYCOLAX) 17 gram PwPk; Take 17 g by mouth once daily.  Dispense: 100 each; Refill: 3        Assessment:  62 year old female with  Ankylosing spondylitis, RA, PSA, Sjogren's  --leukopenia, thrombocytopenia  --controlled type 2 diabetes  --chronic pain syndrome  --depression on paxil    Plan:  Toradol 60, depo 80, b12  Labs  MRI brain  Hematology referral  GYN referral  D/C orencia. Start Cosentyx 150 mg weekly x 5 doses then monthly. Cont plaquenil  Start prednisone 3 mg prn for flares  Increase lyrica 25 mg TID  Cont norco per MD.  I have checked [place] prescription monitoring program site and no unusual or abnormal behavior has occurred pt understand the risk and benefits of taking opioid medications and has decided to continue the medication.  10. Monitor BP at home and keep a log. Notify clinic if BP is persistently >140/90.      Follow up:  4-6 months Dr. Prieto

## 2023-03-02 ENCOUNTER — PATIENT MESSAGE (OUTPATIENT)
Dept: RHEUMATOLOGY | Facility: CLINIC | Age: 63
End: 2023-03-02
Payer: MEDICAID

## 2023-03-02 RX ORDER — HYDROCODONE BITARTRATE AND ACETAMINOPHEN 10; 325 MG/1; MG/1
1 TABLET ORAL 2 TIMES DAILY
Qty: 90 TABLET | Refills: 0 | Status: CANCELLED | OUTPATIENT
Start: 2023-03-02

## 2023-03-02 NOTE — PROGRESS NOTES
PA SUBMITTED BY FAX FOR PATIENTS HYDROCODONE-ACETAMINOPHEN  MG    AWAITING DECISION      CHRISTY MENA CPHT

## 2023-03-02 NOTE — TELEPHONE ENCOUNTER
----- Message from Jina Mercer sent at 3/2/2023  4:35 PM CST -----  Regarding: pa needed  Contact: pharmacy  Type: Needs Medical Advice  Who Called:  [facility]  Symptoms (please be specific):  HYDROcodone-acetaminophen (NORCO)  mg per tablet  How long has patient had these symptoms:    Pharmacy name and phone #:    Best Call Back Number: [phone]     Additional Information: Please notify once sent over. [phone]

## 2023-03-03 ENCOUNTER — SPECIALTY PHARMACY (OUTPATIENT)
Dept: PHARMACY | Facility: CLINIC | Age: 63
End: 2023-03-03
Payer: MEDICAID

## 2023-03-03 NOTE — TELEPHONE ENCOUNTER
Patrice, this is Angelina Conteh with [facility].  We are working on your prescription that your doctor has sent us. We will be working with your insurance to get this approved for you. We will be calling you along the way with updates on your medication.  If you have any questions, you can reach us at [phone].    Welcome call outcome: Patient/caregiver reached    Patient switching from Orencia to Cosentyx.   PA submitted via CMM  (Key: [id number]).

## 2023-03-06 NOTE — TELEPHONE ENCOUNTER
Cosentyx 150 mg loading dose: quantity up to 5 per 28 days approved from 03/03/2023 to 09/03/2023.    Cosentyx 150 mg maint dose: quantity up to 1 per 28 days from 04/01/2023 to 09/03/2023    Case ID:[id number]    Medicaid, FA not required. Forwarding to initial consult.

## 2023-03-07 ENCOUNTER — PATIENT MESSAGE (OUTPATIENT)
Dept: RHEUMATOLOGY | Facility: CLINIC | Age: 63
End: 2023-03-07
Payer: MEDICAID

## 2023-03-07 ENCOUNTER — SPECIALTY PHARMACY (OUTPATIENT)
Dept: PHARMACY | Facility: CLINIC | Age: 63
End: 2023-03-07
Payer: MEDICAID

## 2023-03-07 DIAGNOSIS — M45.9 ANKYLOSING SPONDYLITIS, UNSPECIFIED SITE OF SPINE: Primary | ICD-10-CM

## 2023-03-07 NOTE — TELEPHONE ENCOUNTER
Specialty Pharmacy - Initial Clinical Assessment    Specialty Medication Orders Linked to Encounter      Flowsheet Row Most Recent Value   Medication #1 secukinumab (COSENTYX PEN) 150 mg/mL PnIj (Order#[id number], Rx#[id number])          Patient Diagnosis   M45.9 - Ankylosing spondylitis, unspecified site of spine    Subjective    Malu Holland is a 62 y.o. female, who is followed by the specialty pharmacy service for management and education.    Recent Encounters       Date Type Provider Description    03/07/2023 Specialty Pharmacy Angelina Conteh PharmD Initial Clinical Assessment    03/03/2023 Specialty Pharmacy Angelina Conteh PharmD Referral Authorization    02/20/2023 Specialty Pharmacy [facility] Refill Coordination    01/17/2023 Specialty Pharmacy Angelina Conteh PharmD Refill Coordination    12/30/2022 Specialty Pharmacy Angelina Conteh PharmD Follow-up Clinical Reassessment          Clinical call attempts since last clinical assessment   No call attempts found.     Current Outpatient Medications   Medication Sig    aloe vera 5,000 mg Cap Take by mouth.    aspirin (ECOTRIN) 81 MG EC tablet Take 81 mg by mouth once daily.     aspirin 325 MG tablet Take 325 mg by mouth.    calcium carbonate (OS-LUCIANO) 600 mg calcium (1,500 mg) Tab Take 600 mg by mouth once daily.    cetirizine (ZYRTEC) 10 MG tablet Take 10 mg by mouth daily as needed.    cholecalciferol, vitamin D3, 125 mcg (5,000 unit) Tab Take by mouth.    cyclobenzaprine (FLEXERIL) 10 MG tablet Take 1 tablet (10 mg total) by mouth 3 (three) times daily as needed for Muscle spasms.    cycloSPORINE (RESTASIS) 0.05 % ophthalmic emulsion     diclofenac sodium (VOLTAREN) 1 % Gel Apply 2 g topically.    dicyclomine (BENTYL) 20 mg tablet Take 1 tablet (20 mg total) by mouth 4 (four) times daily.    docusate sodium (COLACE) 100 MG capsule Take 100 mg by mouth.    fluconazole (DIFLUCAN) 150 MG Tab Take 150 mg by mouth once.    folic acid-vit B6-vit B12 (FOLBEE)  2.5-25-1 mg Tab Take 1 tablet by mouth once daily.    glipiZIDE (GLUCOTROL) 5 MG tablet Take 5 mg by mouth.    guaiFENesin (MUCINEX) 600 mg 12 hr tablet Take 1,200 mg by mouth.    HYDROcodone-acetaminophen (NORCO)  mg per tablet Take 1 tablet by mouth 2 (two) times a day.    [START ON 4/1/2023] HYDROcodone-acetaminophen (NORCO)  mg per tablet Take 1 tablet by mouth every 8 (eight) hours as needed for Pain.    HYDROcodone-acetaminophen (NORCO)  mg per tablet Take 1 tablet by mouth every 8 (eight) hours as needed for Pain.    hydrOXYchloroQUINE (PLAQUENIL) 200 mg tablet TAKE ONE TABLET BY MOUTH ONCE DAILY    lisinopril-hydrochlorothiazide (PRINZIDE,ZESTORETIC) 10-12.5 mg per tablet Take 1 tablet by mouth.    LORazepam (ATIVAN) 0.5 MG tablet Take 0.5 mg by mouth.    miscellaneous medical supply (C-TUB) Misc Glucometer and strips#100    multivitamin (THERAGRAN) per tablet Take 1 tablet by mouth.    nystatin (MYCOSTATIN) 100,000 unit/mL suspension     ondansetron (ZOFRAN-ODT) 4 MG TbDL Take 4 mg by mouth every 8 (eight) hours as needed.     pantoprazole (PROTONIX) 40 MG tablet Take 1 tablet (40 mg total) by mouth once daily.    paroxetine (PAXIL) 10 MG tablet Take 1 tablet (10 mg total) by mouth every evening.    polyethylene glycol (GLYCOLAX) 17 gram PwPk Take 17 g by mouth once daily.    pravastatin (PRAVACHOL) 40 MG tablet Take 40 mg by mouth.    predniSONE (DELTASONE) 1 MG tablet TAKE 3 TABLETS BY MOUTH ONCE DAILY.    predniSONE (DELTASONE) 1 MG tablet Take 3 tablets (3 mg total) by mouth daily as needed (arthritis flare).    pregabalin (LYRICA) 25 MG capsule TAKE ONE CAPSULE BY MOUTH THREE TIMES DAILY    secukinumab (COSENTYX PEN) 150 mg/mL PnIj Inject 150 mg into the skin once a week.    secukinumab (COSENTYX PEN) 150 mg/mL PnIj Inject 150 mg into the skin every 28 days.    traZODone (DESYREL) 100 MG tablet TAKE 1 TABLET BY MOUTH NIGHTLY    triamcinolone acetonide 0.1% (KENALOG) 0.1 % ointment  Apply topically 2 (two) times daily.    valACYclovir (VALTREX) 1000 MG tablet TAKE 1 TABLET BY MOUTH THREE TIMES DAILY    zinc gluconate 50 mg tablet Take 50 mg by mouth.   Last reviewed on 3/7/2023 10:23 AM by Angelina Conteh, PharmD    Review of patient's allergies indicates:   Allergen Reactions    Cymbalta [duloxetine] Other (See Comments)     Altered mental status   Last reviewed on  3/7/2023 10:23 AM by Angelina Conteh    Drug Interactions    Clinically relevant drug interactions identified: no         Adverse Effects    Arthralgias: Pos  Back pain: Pos  Joint swelling: Pos       Assessment Questions - Documented Responses      Flowsheet Row Most Recent Value   Assessment    Medication Reconciliation completed for patient Yes   During the past 4 weeks, has patient missed any activities due to condition or medication? No   During the past 4 weeks, did patient have any of the following urgent care visits? None   Goals of Therapy Status Discussed (new start)   Status of the patients ability to self-administer: Is Able   All education points have been covered with patient? Yes, supplemental printed education provided   Welcome packet contents reviewed and discussed with patient? Yes   Assesment completed? Yes   Plan Therapy being initiated   Do you need to open a clinical intervention (i-vent)? No   Do you want to schedule first shipment? Yes   Medication #1 Assessment Info    Patient status New medication, Exisiting to [facility]   Is this medication appropriate for the patient? Yes   Is this medication effective? Not yet started          Refill Questions - Documented Responses      Flowsheet Row Most Recent Value   Patient Availability and HIPAA Verification    Does patient want to proceed with activity? Yes   HIPAA/medical authority confirmed? Yes   Relationship to patient of person spoken to? Self   Refill Screening Questions    When does the patient need to receive the medication? 03/13/23   Refill Delivery Questions  "   How will the patient receive the medication? MEDRx   When does the patient need to receive the medication? 03/13/23   Shipping Address Home   Address in [facility] confirmed and updated if neccessary? Yes   Expected Copay ($) 0   Is the patient able to afford the medication copay? Yes   Payment Method zero copay   Days supply of Refill 28   Supplies needed? No supplies needed   Refill activity completed? Yes   Refill activity plan Refill scheduled   Shipment/Pickup Date: 03/10/23            Objective    She has a past medical history of Acid reflux, Diabetes mellitus, Dry mouth, and Hypertension.    Tried/failed medications: Orencia, plaquenil    BP Readings from Last 4 Encounters:   02/28/23 (!) 158/78   08/30/22 (!) 143/70   02/14/22 135/76   07/12/21 131/77     Ht Readings from Last 4 Encounters:   02/28/23 5' (1.524 m)   08/30/22 5' 0.98" (1.549 m)   02/14/22 5' 0.98" (1.549 m)   07/12/21 5' 1" (1.549 m)     Wt Readings from Last 4 Encounters:   02/28/23 50.8 kg (112 lb)   08/30/22 52.4 kg (115 lb 10.1 oz)   02/14/22 50.2 kg (110 lb 10.7 oz)   07/12/21 51.7 kg (114 lb)     No results for input(s): CREATININE, ALT, AST, HEPBSAG, HEPBSAB, HEPBCAB in the last 2160 hours.  The goals of prescribed drug therapy management include:  Supporting patient to meet the prescriber's medical treatment objectives  Improving or maintaining quality of life  Maintaining optimal therapy adherence  Minimizing and managing side effects      Goals of Therapy Status: Discussed (new start)    Assessment/Plan  Patient plans to start therapy on 03/13/23      Indication, dosage, appropriateness, effectiveness, safety and convenience of her specialty medication(s) were reviewed today.     Patient Education   Patient received education on the following:   Expectations and possible outcomes of therapy  Proper use, timely administration, and missed dose management  Duration of therapy  Side effects, including prevention, " minimization, and management  Contraindications and safety precautions  New or changed medications, including prescribe and over the counter medications and supplements  Reviews recommended vaccinations, as appropriate  Storage, safe handling, and disposal        Tasks added this encounter   4/3/2023 - Refill Call (Auto Added)  12/7/2023 - Clinical - Follow Up Assesement (Annual)   Tasks due within next 3 months   No tasks due.     Angelina Conteh, PharmD  [facility]  [address] [facility] [address]  Phone: [phone]  Fax: [phone]

## 2023-03-21 ENCOUNTER — TELEPHONE (OUTPATIENT)
Dept: RHEUMATOLOGY | Facility: CLINIC | Age: 63
End: 2023-03-21
Payer: MEDICAID

## 2023-03-21 DIAGNOSIS — R41.3 MEMORY DIFFICULTY: Primary | ICD-10-CM

## 2023-03-21 NOTE — TELEPHONE ENCOUNTER
Notify pt that MRI brain will need to be cancelled because it is not covered by insurance. I put in a referral to Neurology for further evaluation of memory concerns and they will order imaging if necessary. Please cancel MRI brain for tomorrow.

## 2023-03-28 ENCOUNTER — TELEPHONE (OUTPATIENT)
Dept: NEUROLOGY | Facility: CLINIC | Age: 63
End: 2023-03-28
Payer: MEDICAID

## 2023-04-04 ENCOUNTER — SPECIALTY PHARMACY (OUTPATIENT)
Dept: PHARMACY | Facility: CLINIC | Age: 63
End: 2023-04-04
Payer: MEDICAID

## 2023-04-04 NOTE — TELEPHONE ENCOUNTER
Specialty Pharmacy - Refill Coordination    Specialty Medication Orders Linked to Encounter      Flowsheet Row Most Recent Value   Medication #1 secukinumab (COSENTYX PEN) 150 mg/mL PnIj (Order#[id number], Rx#[id number])            Refill Questions - Documented Responses      Flowsheet Row Most Recent Value   Patient Availability and HIPAA Verification    Does patient want to proceed with activity? Yes   HIPAA/medical authority confirmed? Yes   Relationship to patient of person spoken to? Self   Refill Screening Questions    Changes to allergies? No   Changes to medications? No   New conditions since last clinic visit? No   Unplanned office visit, urgent care, ED, or hospital admission in the last 4 weeks? No   How does patient/caregiver feel medication is working? Good   Financial problems or insurance changes? No   How many doses of your specialty medications were missed in the last 4 weeks? 0   Would patient like to speak to a pharmacist? No   When does the patient need to receive the medication? 04/10/23   Refill Delivery Questions    How will the patient receive the medication? MEDRx   When does the patient need to receive the medication? 04/10/23   Shipping Address Home   Address in [facility] confirmed and updated if neccessary? Yes   Expected Copay ($) 0   Is the patient able to afford the medication copay? Yes   Payment Method zero copay   Days supply of Refill 28   Supplies needed? No supplies needed   Refill activity completed? Yes   Refill activity plan Refill scheduled   Shipment/Pickup Date: 04/05/23            Current Outpatient Medications   Medication Sig    aloe vera 5,000 mg Cap Take by mouth.    aspirin (ECOTRIN) 81 MG EC tablet Take 81 mg by mouth once daily.     aspirin 325 MG tablet Take 325 mg by mouth.    calcium carbonate (OS-LUCIANO) 600 mg calcium (1,500 mg) Tab Take 600 mg by mouth once daily.    cetirizine (ZYRTEC) 10 MG tablet Take 10 mg by mouth daily as needed.    cholecalciferol,  vitamin D3, 125 mcg (5,000 unit) Tab Take by mouth.    cyclobenzaprine (FLEXERIL) 10 MG tablet Take 1 tablet (10 mg total) by mouth 3 (three) times daily as needed for Muscle spasms.    cycloSPORINE (RESTASIS) 0.05 % ophthalmic emulsion     diclofenac sodium (VOLTAREN) 1 % Gel Apply 2 g topically.    dicyclomine (BENTYL) 20 mg tablet Take 1 tablet (20 mg total) by mouth 4 (four) times daily.    docusate sodium (COLACE) 100 MG capsule Take 100 mg by mouth.    fluconazole (DIFLUCAN) 150 MG Tab Take 150 mg by mouth once.    folic acid-vit B6-vit B12 (FOLBEE) 2.5-25-1 mg Tab Take 1 tablet by mouth once daily.    glipiZIDE (GLUCOTROL) 5 MG tablet Take 5 mg by mouth.    guaiFENesin (MUCINEX) 600 mg 12 hr tablet Take 1,200 mg by mouth.    HYDROcodone-acetaminophen (NORCO)  mg per tablet Take 1 tablet by mouth 2 (two) times a day.    HYDROcodone-acetaminophen (NORCO)  mg per tablet Take 1 tablet by mouth every 8 (eight) hours as needed for Pain.    HYDROcodone-acetaminophen (NORCO)  mg per tablet Take 1 tablet by mouth every 8 (eight) hours as needed for Pain.    hydrOXYchloroQUINE (PLAQUENIL) 200 mg tablet TAKE ONE TABLET BY MOUTH ONCE DAILY    lisinopril-hydrochlorothiazide (PRINZIDE,ZESTORETIC) 10-12.5 mg per tablet Take 1 tablet by mouth.    LORazepam (ATIVAN) 0.5 MG tablet Take 0.5 mg by mouth.    miscellaneous medical supply (C-TUB) Misc Glucometer and strips#100    multivitamin (THERAGRAN) per tablet Take 1 tablet by mouth.    nystatin (MYCOSTATIN) 100,000 unit/mL suspension     ondansetron (ZOFRAN-ODT) 4 MG TbDL Take 4 mg by mouth every 8 (eight) hours as needed.     pantoprazole (PROTONIX) 40 MG tablet Take 1 tablet (40 mg total) by mouth once daily.    paroxetine (PAXIL) 10 MG tablet Take 1 tablet (10 mg total) by mouth every evening.    polyethylene glycol (GLYCOLAX) 17 gram PwPk Take 17 g by mouth once daily.    pravastatin (PRAVACHOL) 40 MG tablet Take 40 mg by mouth.    predniSONE (DELTASONE)  1 MG tablet TAKE 3 TABLETS BY MOUTH ONCE DAILY.    predniSONE (DELTASONE) 1 MG tablet Take 3 tablets (3 mg total) by mouth daily as needed (arthritis flare).    pregabalin (LYRICA) 25 MG capsule TAKE ONE CAPSULE BY MOUTH THREE TIMES DAILY    secukinumab (COSENTYX PEN) 150 mg/mL PnIj Inject 150 mg into the skin every 28 days.    traZODone (DESYREL) 100 MG tablet TAKE 1 TABLET BY MOUTH NIGHTLY    triamcinolone acetonide 0.1% (KENALOG) 0.1 % ointment Apply topically 2 (two) times daily.    valACYclovir (VALTREX) 1000 MG tablet TAKE 1 TABLET BY MOUTH THREE TIMES DAILY    zinc gluconate 50 mg tablet Take 50 mg by mouth.   Last reviewed on 3/7/2023 10:23 AM by Angelina Conteh, PharmADRI    Review of patient's allergies indicates:   Allergen Reactions    Cymbalta [duloxetine] Other (See Comments)     Altered mental status    Last reviewed on  3/7/2023 10:23 AM by Angelina Conteh      Tasks added this encounter   5/1/2023 - Refill Call (Auto Added)   Tasks due within next 3 months   No tasks due.     Maritza Alvarado, PharmD  [facility]  [address] [facility] [address]  Phone: [phone]  Fax: [phone]

## 2023-04-19 DIAGNOSIS — F41.9 ANXIETY: ICD-10-CM

## 2023-04-25 RX ORDER — PAROXETINE 10 MG/1
10 TABLET, FILM COATED ORAL NIGHTLY
Qty: 30 TABLET | Refills: 5 | Status: SHIPPED | OUTPATIENT
Start: 2023-04-25 | End: 2023-10-17 | Stop reason: SDUPTHER

## 2023-05-11 ENCOUNTER — TELEPHONE (OUTPATIENT)
Dept: HEMATOLOGY/ONCOLOGY | Facility: CLINIC | Age: 63
End: 2023-05-11
Payer: MEDICAID

## 2023-05-15 ENCOUNTER — TELEPHONE (OUTPATIENT)
Dept: HEMATOLOGY/ONCOLOGY | Facility: CLINIC | Age: 63
End: 2023-05-15
Payer: MEDICAID

## 2023-05-15 NOTE — TELEPHONE ENCOUNTER
Reviewing outstanding heme referrals.   Pt has referral for Leukopenia from Kiki Wall PA-C, Rheum created on Feb 28th, 2023.  Please advise if pt is still in need for hematology appt and please provide updated labs.

## 2023-05-17 DIAGNOSIS — M47.12 CERVICAL ARTHRITIS WITH MYELOPATHY: ICD-10-CM

## 2023-05-17 DIAGNOSIS — M35.1 MCTD (MIXED CONNECTIVE TISSUE DISEASE): ICD-10-CM

## 2023-05-17 DIAGNOSIS — G89.4 CHRONIC PAIN SYNDROME: ICD-10-CM

## 2023-05-17 DIAGNOSIS — B37.81 THRUSH OF MOUTH AND ESOPHAGUS: ICD-10-CM

## 2023-05-17 DIAGNOSIS — R76.8 ANA POSITIVE: ICD-10-CM

## 2023-05-17 DIAGNOSIS — M35.00 SJOGREN'S SYNDROME, WITH UNSPECIFIED ORGAN INVOLVEMENT: ICD-10-CM

## 2023-05-17 DIAGNOSIS — E06.3 HASHIMOTO'S THYROIDITIS: ICD-10-CM

## 2023-05-17 DIAGNOSIS — M45.9 ANKYLOSING SPONDYLITIS, UNSPECIFIED SITE OF SPINE: ICD-10-CM

## 2023-05-17 DIAGNOSIS — D69.3 ACUTE ITP: ICD-10-CM

## 2023-05-17 DIAGNOSIS — K21.9 GERD WITHOUT ESOPHAGITIS: ICD-10-CM

## 2023-05-17 DIAGNOSIS — M48.02 CERVICAL SPINAL STENOSIS: ICD-10-CM

## 2023-05-17 DIAGNOSIS — M48.062 SPINAL STENOSIS OF LUMBAR REGION WITH NEUROGENIC CLAUDICATION: ICD-10-CM

## 2023-05-17 DIAGNOSIS — B37.0 THRUSH OF MOUTH AND ESOPHAGUS: ICD-10-CM

## 2023-05-23 ENCOUNTER — SPECIALTY PHARMACY (OUTPATIENT)
Dept: PHARMACY | Facility: CLINIC | Age: 63
End: 2023-05-23
Payer: MEDICAID

## 2023-05-23 RX ORDER — PREGABALIN 25 MG/1
CAPSULE ORAL
Qty: 90 CAPSULE | Refills: 3 | Status: SHIPPED | OUTPATIENT
Start: 2023-05-23 | End: 2023-12-19

## 2023-06-14 ENCOUNTER — SPECIALTY PHARMACY (OUTPATIENT)
Dept: PHARMACY | Facility: CLINIC | Age: 63
End: 2023-06-14
Payer: MEDICAID

## 2023-06-14 NOTE — TELEPHONE ENCOUNTER
Outgoing call regarding cosentyx refill; per pt, she's due to inject on 6/22; informed her that it's too soon until 6/16, and [facility] will follow up on that date to schedule delivery

## 2023-06-16 ENCOUNTER — PATIENT MESSAGE (OUTPATIENT)
Dept: PHARMACY | Facility: CLINIC | Age: 63
End: 2023-06-16
Payer: MEDICAID

## 2023-06-16 NOTE — TELEPHONE ENCOUNTER
Specialty Pharmacy - Refill Coordination    Specialty Medication Orders Linked to Encounter      Flowsheet Row Most Recent Value   Medication #1 secukinumab (COSENTYX PEN) 150 mg/mL PnIj (Order#[id number], Rx#[id number])            Refill Questions - Documented Responses      Flowsheet Row Most Recent Value   Patient Availability and HIPAA Verification    Does patient want to proceed with activity? Yes   HIPAA/medical authority confirmed? Yes   Relationship to patient of person spoken to? Self   Refill Screening Questions    Changes to allergies? No   Changes to medications? No   New conditions since last clinic visit? No   Unplanned office visit, urgent care, ED, or hospital admission in the last 4 weeks? No   How does patient/caregiver feel medication is working? Very good   Financial problems or insurance changes? No   How many doses of your specialty medications were missed in the last 4 weeks? 0   Would patient like to speak to a pharmacist? No   When does the patient need to receive the medication? 06/22/23   Refill Delivery Questions    How will the patient receive the medication? MEDRx   When does the patient need to receive the medication? 06/22/23   Shipping Address Home   Address in [facility] confirmed and updated if neccessary? Yes   Expected Copay ($) 3   Is the patient able to afford the medication copay? Yes   Payment Method CC on file   Days supply of Refill 28   Supplies needed? No supplies needed   Refill activity completed? Yes   Refill activity plan Refill scheduled   Shipment/Pickup Date: 06/20/23            Current Outpatient Medications   Medication Sig    aloe vera 5,000 mg Cap Take by mouth.    aspirin (ECOTRIN) 81 MG EC tablet Take 81 mg by mouth once daily.     aspirin 325 MG tablet Take 325 mg by mouth.    calcium carbonate (OS-LUCIANO) 600 mg calcium (1,500 mg) Tab Take 600 mg by mouth once daily.    cetirizine (ZYRTEC) 10 MG tablet Take 10 mg by mouth daily as needed.     cholecalciferol, vitamin D3, 125 mcg (5,000 unit) Tab Take by mouth.    cyclobenzaprine (FLEXERIL) 10 MG tablet Take 1 tablet (10 mg total) by mouth 3 (three) times daily as needed for Muscle spasms.    cycloSPORINE (RESTASIS) 0.05 % ophthalmic emulsion     diclofenac sodium (VOLTAREN) 1 % Gel Apply 2 g topically.    dicyclomine (BENTYL) 20 mg tablet Take 1 tablet (20 mg total) by mouth 4 (four) times daily.    docusate sodium (COLACE) 100 MG capsule Take 100 mg by mouth.    fluconazole (DIFLUCAN) 150 MG Tab Take 150 mg by mouth once.    folic acid-vit B6-vit B12 (FOLBEE) 2.5-25-1 mg Tab Take 1 tablet by mouth once daily.    glipiZIDE (GLUCOTROL) 5 MG tablet Take 5 mg by mouth.    guaiFENesin (MUCINEX) 600 mg 12 hr tablet Take 1,200 mg by mouth.    HYDROcodone-acetaminophen (NORCO)  mg per tablet Take 1 tablet by mouth 2 (two) times a day.    HYDROcodone-acetaminophen (NORCO)  mg per tablet Take 1 tablet by mouth every 8 (eight) hours as needed for Pain.    HYDROcodone-acetaminophen (NORCO)  mg per tablet Take 1 tablet by mouth every 8 (eight) hours as needed for Pain.    hydrOXYchloroQUINE (PLAQUENIL) 200 mg tablet TAKE ONE TABLET BY MOUTH ONCE DAILY    lisinopril-hydrochlorothiazide (PRINZIDE,ZESTORETIC) 10-12.5 mg per tablet Take 1 tablet by mouth.    LORazepam (ATIVAN) 0.5 MG tablet Take 0.5 mg by mouth.    miscellaneous medical supply (C-TUB) Misc Glucometer and strips#100    multivitamin (THERAGRAN) per tablet Take 1 tablet by mouth.    nystatin (MYCOSTATIN) 100,000 unit/mL suspension     ondansetron (ZOFRAN-ODT) 4 MG TbDL Take 4 mg by mouth every 8 (eight) hours as needed.     pantoprazole (PROTONIX) 40 MG tablet Take 1 tablet (40 mg total) by mouth once daily.    paroxetine (PAXIL) 10 MG tablet Take 1 tablet (10 mg total) by mouth every evening.    polyethylene glycol (GLYCOLAX) 17 gram PwPk Take 17 g by mouth once daily.    pravastatin (PRAVACHOL) 40 MG tablet Take 40 mg by mouth.     predniSONE (DELTASONE) 1 MG tablet TAKE 3 TABLETS BY MOUTH ONCE DAILY.    predniSONE (DELTASONE) 1 MG tablet Take 3 tablets (3 mg total) by mouth daily as needed (arthritis flare).    pregabalin (LYRICA) 25 MG capsule TAKE ONE CAPSULE BY MOUTH THREE TIMES DAILY    secukinumab (COSENTYX PEN) 150 mg/mL PnIj Inject 150 mg into the skin every 28 days.    traZODone (DESYREL) 100 MG tablet TAKE 1 TABLET BY MOUTH NIGHTLY    triamcinolone acetonide 0.1% (KENALOG) 0.1 % ointment Apply topically 2 (two) times daily.    valACYclovir (VALTREX) 1000 MG tablet TAKE 1 TABLET BY MOUTH THREE TIMES DAILY    zinc gluconate 50 mg tablet Take 50 mg by mouth.   Last reviewed on 3/7/2023 10:23 AM by Angelina Conteh, Igor    Review of patient's allergies indicates:   Allergen Reactions    Cymbalta [duloxetine] Other (See Comments)     Altered mental status    Last reviewed on  5/22/2023 2:50 PM by Almaz Carmen      Tasks added this encounter   No tasks added.   Tasks due within next 3 months   6/19/2023 - Refill Coordination Outreach (1 time occurrence)     Emeli Ritter, PharmD  [facility]  [address] [place] [address]  Phone: [phone]  Fax: [phone]

## 2023-06-27 DIAGNOSIS — K21.9 GERD WITHOUT ESOPHAGITIS: ICD-10-CM

## 2023-06-29 RX ORDER — PANTOPRAZOLE SODIUM 40 MG/1
40 TABLET, DELAYED RELEASE ORAL DAILY
Qty: 30 TABLET | Refills: 4 | Status: SHIPPED | OUTPATIENT
Start: 2023-06-29 | End: 2023-11-14 | Stop reason: SDUPTHER

## 2023-07-11 ENCOUNTER — SPECIALTY PHARMACY (OUTPATIENT)
Dept: PHARMACY | Facility: CLINIC | Age: 63
End: 2023-07-11
Payer: MEDICAID

## 2023-07-11 NOTE — TELEPHONE ENCOUNTER
Specialty Pharmacy - Refill Coordination    Specialty Medication Orders Linked to Encounter      Flowsheet Row Most Recent Value   Medication #1 secukinumab (COSENTYX PEN) 150 mg/mL PnIj (Order#[id number], Rx#[id number])            Refill Questions - Documented Responses      Flowsheet Row Most Recent Value   Patient Availability and HIPAA Verification    Does patient want to proceed with activity? Yes   HIPAA/medical authority confirmed? Yes   Relationship to patient of person spoken to? Self   Refill Screening Questions    Changes to allergies? No   Changes to medications? No   New conditions since last clinic visit? No   Unplanned office visit, urgent care, ED, or hospital admission in the last 4 weeks? No   How does patient/caregiver feel medication is working? Good   Financial problems or insurance changes? No   How many doses of your specialty medications were missed in the last 4 weeks? 0   Would patient like to speak to a pharmacist? No   When does the patient need to receive the medication? 07/16/23   Refill Delivery Questions    How will the patient receive the medication? MEDRx   When does the patient need to receive the medication? 07/16/23   Shipping Address Home   Address in [facility] confirmed and updated if neccessary? Yes   Expected Copay ($) 0   Is the patient able to afford the medication copay? Yes   Payment Method zero copay   Days supply of Refill 28   Supplies needed? No supplies needed   Refill activity completed? Yes   Refill activity plan Refill scheduled   Shipment/Pickup Date: 07/13/23            Current Outpatient Medications   Medication Sig    aloe vera 5,000 mg Cap Take by mouth.    aspirin (ECOTRIN) 81 MG EC tablet Take 81 mg by mouth once daily.     aspirin 325 MG tablet Take 325 mg by mouth.    calcium carbonate (OS-LUCIANO) 600 mg calcium (1,500 mg) Tab Take 600 mg by mouth once daily.    cetirizine (ZYRTEC) 10 MG tablet Take 10 mg by mouth daily as needed.    cholecalciferol,  vitamin D3, 125 mcg (5,000 unit) Tab Take by mouth.    cyclobenzaprine (FLEXERIL) 10 MG tablet Take 1 tablet (10 mg total) by mouth 3 (three) times daily as needed for Muscle spasms.    cycloSPORINE (RESTASIS) 0.05 % ophthalmic emulsion     diclofenac sodium (VOLTAREN) 1 % Gel Apply 2 g topically.    dicyclomine (BENTYL) 20 mg tablet Take 1 tablet (20 mg total) by mouth 4 (four) times daily.    docusate sodium (COLACE) 100 MG capsule Take 100 mg by mouth.    fluconazole (DIFLUCAN) 150 MG Tab Take 150 mg by mouth once.    folic acid-vit B6-vit B12 (FOLBEE) 2.5-25-1 mg Tab Take 1 tablet by mouth once daily.    glipiZIDE (GLUCOTROL) 5 MG tablet Take 5 mg by mouth.    guaiFENesin (MUCINEX) 600 mg 12 hr tablet Take 1,200 mg by mouth.    HYDROcodone-acetaminophen (NORCO)  mg per tablet Take 1 tablet by mouth 2 (two) times a day.    HYDROcodone-acetaminophen (NORCO)  mg per tablet Take 1 tablet by mouth every 8 (eight) hours as needed for Pain.    HYDROcodone-acetaminophen (NORCO)  mg per tablet Take 1 tablet by mouth every 8 (eight) hours as needed for Pain.    hydrOXYchloroQUINE (PLAQUENIL) 200 mg tablet TAKE ONE TABLET BY MOUTH ONCE DAILY    lisinopril-hydrochlorothiazide (PRINZIDE,ZESTORETIC) 10-12.5 mg per tablet Take 1 tablet by mouth.    LORazepam (ATIVAN) 0.5 MG tablet Take 0.5 mg by mouth.    miscellaneous medical supply (C-TUB) Misc Glucometer and strips#100    multivitamin (THERAGRAN) per tablet Take 1 tablet by mouth.    nystatin (MYCOSTATIN) 100,000 unit/mL suspension     ondansetron (ZOFRAN-ODT) 4 MG TbDL Take 4 mg by mouth every 8 (eight) hours as needed.     pantoprazole (PROTONIX) 40 MG tablet Take 1 tablet (40 mg total) by mouth once daily.    paroxetine (PAXIL) 10 MG tablet Take 1 tablet (10 mg total) by mouth every evening.    polyethylene glycol (GLYCOLAX) 17 gram PwPk Take 17 g by mouth once daily.    pravastatin (PRAVACHOL) 40 MG tablet Take 40 mg by mouth.    predniSONE (DELTASONE)  1 MG tablet TAKE 3 TABLETS BY MOUTH ONCE DAILY.    predniSONE (DELTASONE) 1 MG tablet Take 3 tablets (3 mg total) by mouth daily as needed (arthritis flare).    pregabalin (LYRICA) 25 MG capsule TAKE ONE CAPSULE BY MOUTH THREE TIMES DAILY    secukinumab (COSENTYX PEN) 150 mg/mL PnIj Inject 150 mg into the skin every 28 days.    traZODone (DESYREL) 100 MG tablet TAKE 1 TABLET BY MOUTH NIGHTLY    triamcinolone acetonide 0.1% (KENALOG) 0.1 % ointment Apply topically 2 (two) times daily.    valACYclovir (VALTREX) 1000 MG tablet TAKE 1 TABLET BY MOUTH THREE TIMES DAILY    zinc gluconate 50 mg tablet Take 50 mg by mouth.   Last reviewed on 3/7/2023 10:23 AM by Angelina Conteh, Igor    Review of patient's allergies indicates:   Allergen Reactions    Cymbalta [duloxetine] Other (See Comments)     Altered mental status    Last reviewed on  6/27/2023 1:39 PM by Sharyn Hassan      Tasks added this encounter   No tasks added.   Tasks due within next 3 months   7/11/2023 - Refill Coordination Outreach (1 time occurrence)     [facility]  [address]  [facility] [address]  Phone: [phone]  Fax: [phone]

## 2023-07-13 DIAGNOSIS — K21.9 GERD WITHOUT ESOPHAGITIS: ICD-10-CM

## 2023-07-16 RX ORDER — DICYCLOMINE HYDROCHLORIDE 10 MG/1
CAPSULE ORAL
Qty: 240 CAPSULE | Refills: 3 | Status: SHIPPED | OUTPATIENT
Start: 2023-07-16 | End: 2023-11-11

## 2023-08-03 ENCOUNTER — SPECIALTY PHARMACY (OUTPATIENT)
Dept: PHARMACY | Facility: CLINIC | Age: 63
End: 2023-08-03
Payer: MEDICAID

## 2023-08-10 NOTE — TELEPHONE ENCOUNTER
Specialty Pharmacy - Refill Coordination    Specialty Medication Orders Linked to Encounter      Flowsheet Row Most Recent Value   Medication #1 secukinumab (COSENTYX PEN) 150 mg/mL PnIj (Order#[id number], Rx#[id number])            Refill Questions - Documented Responses      Flowsheet Row Most Recent Value   Patient Availability and HIPAA Verification    Does patient want to proceed with activity? Yes   HIPAA/medical authority confirmed? Yes   Relationship to patient of person spoken to? Self   Refill Screening Questions    Changes to allergies? No   Changes to medications? No   New conditions since last clinic visit? No   Unplanned office visit, urgent care, ED, or hospital admission in the last 4 weeks? No   How does patient/caregiver feel medication is working? Very good   Financial problems or insurance changes? No   How many doses of your specialty medications were missed in the last 4 weeks? 0   Would patient like to speak to a pharmacist? No   When does the patient need to receive the medication? 08/17/23   Refill Delivery Questions    How will the patient receive the medication? MEDRx   When does the patient need to receive the medication? 08/17/23   Shipping Address Home   Address in [facility] confirmed and updated if neccessary? Yes   Expected Copay ($) 3   Is the patient able to afford the medication copay? Yes   Payment Method CC on file   Days supply of Refill 28   Supplies needed? No supplies needed   Refill activity completed? Yes   Refill activity plan Refill scheduled   Shipment/Pickup Date: 08/15/23            Current Outpatient Medications   Medication Sig    aloe vera 5,000 mg Cap Take by mouth.    aspirin (ECOTRIN) 81 MG EC tablet Take 81 mg by mouth once daily.     aspirin 325 MG tablet Take 325 mg by mouth.    calcium carbonate (OS-LUCIANO) 600 mg calcium (1,500 mg) Tab Take 600 mg by mouth once daily.    cetirizine (ZYRTEC) 10 MG tablet Take 10 mg by mouth daily as needed.     cholecalciferol, vitamin D3, 125 mcg (5,000 unit) Tab Take by mouth.    cyclobenzaprine (FLEXERIL) 10 MG tablet Take 1 tablet (10 mg total) by mouth 3 (three) times daily as needed for Muscle spasms.    cycloSPORINE (RESTASIS) 0.05 % ophthalmic emulsion     diclofenac sodium (VOLTAREN) 1 % Gel Apply 2 g topically.    dicyclomine (BENTYL) 10 MG capsule TAKE TWO CAPSULES (20MG) BY MOUTH FOUR TIMES DAILY    docusate sodium (COLACE) 100 MG capsule Take 100 mg by mouth.    fluconazole (DIFLUCAN) 150 MG Tab Take 150 mg by mouth once.    folic acid-vit B6-vit B12 (FOLBEE) 2.5-25-1 mg Tab Take 1 tablet by mouth once daily.    glipiZIDE (GLUCOTROL) 5 MG tablet Take 5 mg by mouth.    guaiFENesin (MUCINEX) 600 mg 12 hr tablet Take 1,200 mg by mouth.    HYDROcodone-acetaminophen (NORCO)  mg per tablet Take 1 tablet by mouth 2 (two) times a day.    HYDROcodone-acetaminophen (NORCO)  mg per tablet Take 1 tablet by mouth every 8 (eight) hours as needed for Pain.    HYDROcodone-acetaminophen (NORCO)  mg per tablet Take 1 tablet by mouth every 8 (eight) hours as needed for Pain.    hydrOXYchloroQUINE (PLAQUENIL) 200 mg tablet TAKE ONE TABLET BY MOUTH ONCE DAILY    lisinopril-hydrochlorothiazide (PRINZIDE,ZESTORETIC) 10-12.5 mg per tablet Take 1 tablet by mouth.    LORazepam (ATIVAN) 0.5 MG tablet Take 0.5 mg by mouth.    miscellaneous medical supply (C-TUB) Misc Glucometer and strips#100    multivitamin (THERAGRAN) per tablet Take 1 tablet by mouth.    nystatin (MYCOSTATIN) 100,000 unit/mL suspension     ondansetron (ZOFRAN-ODT) 4 MG TbDL Take 4 mg by mouth every 8 (eight) hours as needed.     pantoprazole (PROTONIX) 40 MG tablet Take 1 tablet (40 mg total) by mouth once daily.    paroxetine (PAXIL) 10 MG tablet Take 1 tablet (10 mg total) by mouth every evening.    polyethylene glycol (GLYCOLAX) 17 gram PwPk Take 17 g by mouth once daily.    pravastatin (PRAVACHOL) 40 MG tablet Take 40 mg by mouth.    predniSONE  (DELTASONE) 1 MG tablet TAKE 3 TABLETS BY MOUTH ONCE DAILY.    predniSONE (DELTASONE) 1 MG tablet Take 3 tablets (3 mg total) by mouth daily as needed (arthritis flare).    pregabalin (LYRICA) 25 MG capsule TAKE ONE CAPSULE BY MOUTH THREE TIMES DAILY    secukinumab (COSENTYX PEN) 150 mg/mL PnIj Inject 150 mg into the skin every 28 days.    traZODone (DESYREL) 100 MG tablet TAKE 1 TABLET BY MOUTH NIGHTLY    triamcinolone acetonide 0.1% (KENALOG) 0.1 % ointment Apply topically 2 (two) times daily.    valACYclovir (VALTREX) 1000 MG tablet TAKE 1 TABLET BY MOUTH THREE TIMES DAILY    zinc gluconate 50 mg tablet Take 50 mg by mouth.   Last reviewed on 3/7/2023 10:23 AM by Angelina Conteh, PharmD    Review of patient's allergies indicates:   Allergen Reactions    Cymbalta [duloxetine] Other (See Comments)     Altered mental status    Last reviewed on  7/13/2023 1:49 PM by Almaz Carmen      Tasks added this encounter   No tasks added.   Tasks due within next 3 months   No tasks due.     Day Espinoza, PharmD  [facility]  [address] [place] [address]  Phone: [phone]  Fax: [phone]

## 2023-09-25 ENCOUNTER — LAB VISIT (OUTPATIENT)
Dept: LAB | Facility: HOSPITAL | Age: 63
End: 2023-09-25
Attending: PHYSICIAN ASSISTANT
Payer: MEDICAID

## 2023-09-25 DIAGNOSIS — R42 DIZZINESS: ICD-10-CM

## 2023-09-25 DIAGNOSIS — M45.9 ANKYLOSING SPONDYLITIS, UNSPECIFIED SITE OF SPINE: ICD-10-CM

## 2023-09-25 DIAGNOSIS — R41.3 OTHER AMNESIA: ICD-10-CM

## 2023-09-25 DIAGNOSIS — M35.00 SJOGREN'S SYNDROME, WITH UNSPECIFIED ORGAN INVOLVEMENT: ICD-10-CM

## 2023-09-25 LAB
ALBUMIN SERPL BCP-MCNC: 4.4 G/DL (ref 3.5–5.2)
ALP SERPL-CCNC: 52 U/L (ref 55–135)
ALT SERPL W/O P-5'-P-CCNC: 36 U/L (ref 10–44)
ANION GAP SERPL CALC-SCNC: 10 MMOL/L (ref 8–16)
AST SERPL-CCNC: 35 U/L (ref 10–40)
BASOPHILS # BLD AUTO: 0.01 K/UL (ref 0–0.2)
BASOPHILS NFR BLD: 0.3 % (ref 0–1.9)
BILIRUB SERPL-MCNC: 0.5 MG/DL (ref 0.1–1)
BUN SERPL-MCNC: 7 MG/DL (ref 8–23)
CALCIUM SERPL-MCNC: 9.9 MG/DL (ref 8.7–10.5)
CHLORIDE SERPL-SCNC: 100 MMOL/L (ref 95–110)
CO2 SERPL-SCNC: 30 MMOL/L (ref 23–29)
CREAT SERPL-MCNC: 0.6 MG/DL (ref 0.5–1.4)
CRP SERPL-MCNC: 0.4 MG/L (ref 0–8.2)
DIFFERENTIAL METHOD: ABNORMAL
EOSINOPHIL # BLD AUTO: 0.1 K/UL (ref 0–0.5)
EOSINOPHIL NFR BLD: 1.8 % (ref 0–8)
ERYTHROCYTE [DISTWIDTH] IN BLOOD BY AUTOMATED COUNT: 13.5 % (ref 11.5–14.5)
ERYTHROCYTE [SEDIMENTATION RATE] IN BLOOD BY WESTERGREN METHOD: 17 MM/HR (ref 0–20)
EST. GFR  (NO RACE VARIABLE): >60 ML/MIN/1.73 M^2
GLUCOSE SERPL-MCNC: 129 MG/DL (ref 70–110)
HCT VFR BLD AUTO: 43.1 % (ref 37–48.5)
HGB BLD-MCNC: 15 G/DL (ref 12–16)
IMM GRANULOCYTES # BLD AUTO: 0 K/UL (ref 0–0.04)
IMM GRANULOCYTES NFR BLD AUTO: 0 % (ref 0–0.5)
LYMPHOCYTES # BLD AUTO: 1.4 K/UL (ref 1–4.8)
LYMPHOCYTES NFR BLD: 36.3 % (ref 18–48)
MCH RBC QN AUTO: 31.8 PG (ref 27–31)
MCHC RBC AUTO-ENTMCNC: 34.8 G/DL (ref 32–36)
MCV RBC AUTO: 91 FL (ref 82–98)
MONOCYTES # BLD AUTO: 0.3 K/UL (ref 0.3–1)
MONOCYTES NFR BLD: 8.8 % (ref 4–15)
NEUTROPHILS # BLD AUTO: 2 K/UL (ref 1.8–7.7)
NEUTROPHILS NFR BLD: 52.8 % (ref 38–73)
NRBC BLD-RTO: 0 /100 WBC
PLATELET # BLD AUTO: 137 K/UL (ref 150–450)
PMV BLD AUTO: 11.1 FL (ref 9.2–12.9)
POTASSIUM SERPL-SCNC: 3.8 MMOL/L (ref 3.5–5.1)
PROT SERPL-MCNC: 7.7 G/DL (ref 6–8.4)
RBC # BLD AUTO: 4.72 M/UL (ref 4–5.4)
SODIUM SERPL-SCNC: 140 MMOL/L (ref 136–145)
VIT B12 SERPL-MCNC: >2000 PG/ML (ref 210–950)
WBC # BLD AUTO: 3.86 K/UL (ref 3.9–12.7)

## 2023-09-25 PROCEDURE — 80053 COMPREHEN METABOLIC PANEL: CPT | Performed by: PHYSICIAN ASSISTANT

## 2023-09-25 PROCEDURE — 36415 COLL VENOUS BLD VENIPUNCTURE: CPT | Mod: PO | Performed by: PHYSICIAN ASSISTANT

## 2023-09-25 PROCEDURE — 82607 VITAMIN B-12: CPT | Performed by: PHYSICIAN ASSISTANT

## 2023-09-25 PROCEDURE — 85025 COMPLETE CBC W/AUTO DIFF WBC: CPT | Performed by: PHYSICIAN ASSISTANT

## 2023-09-25 PROCEDURE — 86140 C-REACTIVE PROTEIN: CPT | Performed by: PHYSICIAN ASSISTANT

## 2023-09-25 PROCEDURE — 85651 RBC SED RATE NONAUTOMATED: CPT | Mod: PO | Performed by: PHYSICIAN ASSISTANT

## 2023-09-28 ENCOUNTER — OFFICE VISIT (OUTPATIENT)
Dept: RHEUMATOLOGY | Facility: CLINIC | Age: 63
End: 2023-09-28
Payer: MEDICAID

## 2023-09-28 VITALS
BODY MASS INDEX: 21.47 KG/M2 | HEART RATE: 74 BPM | WEIGHT: 109.38 LBS | SYSTOLIC BLOOD PRESSURE: 184 MMHG | HEIGHT: 60 IN | DIASTOLIC BLOOD PRESSURE: 89 MMHG

## 2023-09-28 DIAGNOSIS — D69.6 THROMBOCYTOPENIA: ICD-10-CM

## 2023-09-28 DIAGNOSIS — I10 HYPERTENSION, UNSPECIFIED TYPE: ICD-10-CM

## 2023-09-28 DIAGNOSIS — R41.3 MEMORY DIFFICULTY: ICD-10-CM

## 2023-09-28 DIAGNOSIS — G89.4 CHRONIC PAIN SYNDROME: ICD-10-CM

## 2023-09-28 DIAGNOSIS — D72.819 LEUKOPENIA, UNSPECIFIED TYPE: ICD-10-CM

## 2023-09-28 DIAGNOSIS — M48.02 CERVICAL SPINAL STENOSIS: ICD-10-CM

## 2023-09-28 DIAGNOSIS — M45.9 ANKYLOSING SPONDYLITIS, UNSPECIFIED SITE OF SPINE: ICD-10-CM

## 2023-09-28 DIAGNOSIS — B02.8 HERPES ZOSTER WITH OTHER COMPLICATION: Primary | ICD-10-CM

## 2023-09-28 DIAGNOSIS — R11.2 NAUSEA AND VOMITING, UNSPECIFIED VOMITING TYPE: ICD-10-CM

## 2023-09-28 DIAGNOSIS — M35.00 SJOGREN'S SYNDROME, WITH UNSPECIFIED ORGAN INVOLVEMENT: ICD-10-CM

## 2023-09-28 PROCEDURE — 1159F MED LIST DOCD IN RCRD: CPT | Mod: CPTII,,, | Performed by: INTERNAL MEDICINE

## 2023-09-28 PROCEDURE — 99999 PR PBB SHADOW E&M-EST. PATIENT-LVL V: ICD-10-PCS | Mod: PBBFAC,,, | Performed by: INTERNAL MEDICINE

## 2023-09-28 PROCEDURE — 3079F DIAST BP 80-89 MM HG: CPT | Mod: CPTII,,, | Performed by: INTERNAL MEDICINE

## 2023-09-28 PROCEDURE — 1160F PR REVIEW ALL MEDS BY PRESCRIBER/CLIN PHARMACIST DOCUMENTED: ICD-10-PCS | Mod: CPTII,,, | Performed by: INTERNAL MEDICINE

## 2023-09-28 PROCEDURE — 99999PBSHW PR PBB SHADOW TECHNICAL ONLY FILED TO HB: ICD-10-PCS | Mod: PBBFAC,,,

## 2023-09-28 PROCEDURE — 99215 PR OFFICE/OUTPT VISIT, EST, LEVL V, 40-54 MIN: ICD-10-PCS | Mod: 25,S$PBB,, | Performed by: INTERNAL MEDICINE

## 2023-09-28 PROCEDURE — 1159F PR MEDICATION LIST DOCUMENTED IN MEDICAL RECORD: ICD-10-PCS | Mod: CPTII,,, | Performed by: INTERNAL MEDICINE

## 2023-09-28 PROCEDURE — 99999PBSHW PR PBB SHADOW TECHNICAL ONLY FILED TO HB: Mod: PBBFAC,,,

## 2023-09-28 PROCEDURE — 99999 PR PBB SHADOW E&M-EST. PATIENT-LVL V: CPT | Mod: PBBFAC,,, | Performed by: INTERNAL MEDICINE

## 2023-09-28 PROCEDURE — 3008F PR BODY MASS INDEX (BMI) DOCUMENTED: ICD-10-PCS | Mod: CPTII,,, | Performed by: INTERNAL MEDICINE

## 2023-09-28 PROCEDURE — 3077F PR MOST RECENT SYSTOLIC BLOOD PRESSURE >= 140 MM HG: ICD-10-PCS | Mod: CPTII,,, | Performed by: INTERNAL MEDICINE

## 2023-09-28 PROCEDURE — 99215 OFFICE O/P EST HI 40 MIN: CPT | Mod: 25,S$PBB,, | Performed by: INTERNAL MEDICINE

## 2023-09-28 PROCEDURE — 3008F BODY MASS INDEX DOCD: CPT | Mod: CPTII,,, | Performed by: INTERNAL MEDICINE

## 2023-09-28 PROCEDURE — 1160F RVW MEDS BY RX/DR IN RCRD: CPT | Mod: CPTII,,, | Performed by: INTERNAL MEDICINE

## 2023-09-28 PROCEDURE — 4010F PR ACE/ARB THEARPY RXD/TAKEN: ICD-10-PCS | Mod: CPTII,,, | Performed by: INTERNAL MEDICINE

## 2023-09-28 PROCEDURE — 99215 OFFICE O/P EST HI 40 MIN: CPT | Mod: PBBFAC,PN | Performed by: INTERNAL MEDICINE

## 2023-09-28 PROCEDURE — 3077F SYST BP >= 140 MM HG: CPT | Mod: CPTII,,, | Performed by: INTERNAL MEDICINE

## 2023-09-28 PROCEDURE — 3079F PR MOST RECENT DIASTOLIC BLOOD PRESSURE 80-89 MM HG: ICD-10-PCS | Mod: CPTII,,, | Performed by: INTERNAL MEDICINE

## 2023-09-28 PROCEDURE — 4010F ACE/ARB THERAPY RXD/TAKEN: CPT | Mod: CPTII,,, | Performed by: INTERNAL MEDICINE

## 2023-09-28 PROCEDURE — 96372 THER/PROPH/DIAG INJ SC/IM: CPT | Mod: PBBFAC,PN

## 2023-09-28 RX ORDER — LISINOPRIL 5 MG/1
5 TABLET ORAL DAILY
Qty: 90 TABLET | Refills: 3 | Status: SHIPPED | OUTPATIENT
Start: 2023-09-28 | End: 2024-09-27

## 2023-09-28 RX ORDER — KETOROLAC TROMETHAMINE 30 MG/ML
60 INJECTION, SOLUTION INTRAMUSCULAR; INTRAVENOUS
Status: COMPLETED | OUTPATIENT
Start: 2023-09-28 | End: 2023-09-28

## 2023-09-28 RX ORDER — METHYLPREDNISOLONE ACETATE 80 MG/ML
80 INJECTION, SUSPENSION INTRA-ARTICULAR; INTRALESIONAL; INTRAMUSCULAR; SOFT TISSUE
Status: COMPLETED | OUTPATIENT
Start: 2023-09-28 | End: 2023-09-28

## 2023-09-28 RX ORDER — CYANOCOBALAMIN 1000 UG/ML
1000 INJECTION, SOLUTION INTRAMUSCULAR; SUBCUTANEOUS
Status: COMPLETED | OUTPATIENT
Start: 2023-09-28 | End: 2023-09-28

## 2023-09-28 RX ORDER — HYDROXYCHLOROQUINE SULFATE 200 MG/1
200 TABLET, FILM COATED ORAL DAILY
Qty: 30 TABLET | Refills: 6 | Status: SHIPPED | OUTPATIENT
Start: 2023-09-28

## 2023-09-28 RX ORDER — HYDROCODONE BITARTRATE AND ACETAMINOPHEN 10; 325 MG/1; MG/1
1 TABLET ORAL EVERY 8 HOURS PRN
Qty: 90 TABLET | Refills: 0 | Status: SHIPPED | OUTPATIENT
Start: 2023-10-20 | End: 2023-11-19

## 2023-09-28 RX ORDER — VALACYCLOVIR HYDROCHLORIDE 1 G/1
1000 TABLET, FILM COATED ORAL 3 TIMES DAILY
Qty: 42 TABLET | Refills: 2 | Status: SHIPPED | OUTPATIENT
Start: 2023-09-28 | End: 2023-11-09

## 2023-09-28 RX ORDER — HYDROCODONE BITARTRATE AND ACETAMINOPHEN 10; 325 MG/1; MG/1
1 TABLET ORAL EVERY 8 HOURS PRN
Qty: 90 TABLET | Refills: 0 | Status: SHIPPED | OUTPATIENT
Start: 2023-09-28 | End: 2023-10-28

## 2023-09-28 RX ORDER — ONDANSETRON 4 MG/1
4 TABLET, ORALLY DISINTEGRATING ORAL EVERY 8 HOURS PRN
Qty: 45 TABLET | Refills: 0 | Status: SHIPPED | OUTPATIENT
Start: 2023-09-28 | End: 2023-10-13

## 2023-09-28 RX ADMIN — METHYLPREDNISOLONE ACETATE 80 MG: 80 INJECTION, SUSPENSION INTRA-ARTICULAR; INTRALESIONAL; INTRAMUSCULAR; SOFT TISSUE at 04:09

## 2023-09-28 RX ADMIN — CYANOCOBALAMIN 1000 MCG: 1000 INJECTION INTRAMUSCULAR; SUBCUTANEOUS at 04:09

## 2023-09-28 RX ADMIN — KETOROLAC TROMETHAMINE 60 MG: 60 INJECTION, SOLUTION INTRAMUSCULAR at 04:09

## 2023-09-28 ASSESSMENT — ROUTINE ASSESSMENT OF PATIENT INDEX DATA (RAPID3)
PAIN SCORE: 7
TOTAL RAPID3 SCORE: 7.22
FATIGUE SCORE: 1.1
MDHAQ FUNCTION SCORE: 2.3
PSYCHOLOGICAL DISTRESS SCORE: 2.2
PATIENT GLOBAL ASSESSMENT SCORE: 7

## 2023-09-28 NOTE — PROGRESS NOTES
Subjective:       Patient ID: Malu Holland is a 63 y.o. female.    Chief Complaint: Disease Management    Follow up:  62 year old female who presents to clinic for follow up on ankylosing spondylitis and sjgoren's syndrome. She is on cosentyx 150/ she had a colonscopy  and post had severe nausea and vomiting.She reports increased pain in her shoulders, ribs, clavicle, and sternum that is reproducible w/palpation.  She bent forward and fell over and landed on her buttock and reports increased R low back pain. She complains of memory concerns and difficulty with word finding and short term memory loss. Her family members have noticed this change in her.   She denies rash. Complains of fevers  x 5-1 years.     Review of Systems   Constitutional:  Positive for activity change. Negative for appetite change and chills.   Eyes:  Negative for visual disturbance.   Respiratory:  Negative for cough and shortness of breath.    Cardiovascular:  Positive for chest pain (atypical chest wall pain). Negative for palpitations and leg swelling.   Gastrointestinal:  Negative for abdominal pain, constipation, diarrhea, nausea and vomiting.   Musculoskeletal:  Positive for arthralgias, back pain, gait problem, neck pain and neck stiffness.   Neurological:  Positive for weakness. Negative for dizziness and light-headedness.        Memory loss   Psychiatric/Behavioral:  Positive for dysphoric mood.          Objective:     Vitals:    23 1410   BP: (!) 184/89   Pulse: 74       Past Medical History:   Diagnosis Date    Acid reflux     Diabetes mellitus     Dry mouth     Hypertension      Past Surgical History:   Procedure Laterality Date    ADENOIDECTOMY       SECTION      CHOLECYSTECTOMY      COLON SURGERY      HYSTERECTOMY      LASIK      TONSILLECTOMY            Physical Exam   Constitutional: She is oriented to person, place, and time.   Eyes: Right conjunctiva is not injected. Left conjunctiva is not injected.   Neck:  No JVD present. No thyromegaly present.   Cardiovascular: Normal rate.   Pulmonary/Chest: Effort normal.   Musculoskeletal:      Right shoulder: Normal.      Left shoulder: Normal.      Right elbow: Normal.      Left elbow: Normal.      Right wrist: Normal.      Left wrist: Normal.      Right knee: Normal.      Left knee: Normal.   Lymphadenopathy:     She has no cervical adenopathy.   Neurological: She is alert and oriented to person, place, and time. Gait normal.   Skin: No rash noted.   Psychiatric: Mood and affect normal.       Right Side Rheumatological Exam     Examination finds the shoulder, elbow, wrist, knee, 1st MCP, 2nd MCP, 3rd MCP, 4th MCP and 5th MCP normal.    The patient is tender to palpation of the 1st PIP, 2nd PIP, 3rd PIP, 4th PIP and 5th PIP    The patient has an enlarged 1st PIP, 2nd PIP, 3rd PIP, 4th PIP and 5th PIP    Left Side Rheumatological Exam     Examination finds the shoulder, elbow, wrist, knee, 1st MCP, 2nd MCP, 3rd MCP, 4th MCP and 5th MCP normal.    The patient is tender to palpation of the 1st PIP, 2nd PIP, 3rd PIP, 4th PIP and 5th PIP.    The patient has an enlarged 1st PIP, 2nd PIP, 3rd PIP, 4th PIP and 5th PIP.      Back/Neck Exam   Tenderness Right paramedian tenderness of the Lower C-Spine, Upper T-Spine, Lower L-Spine, Upper L-Spine and Lower T-Spine.Left paramedian tenderness of the Lower C-Spine, Upper T-Spine, Lower T-Spine, Upper L-Spine and Lower L-Spine.        Anatomical Region Laterality Modality   Abdomen -- Computed Tomography   Pelvis -- --   Hip -- --     Narrative    REASON FOR EXAM: Abdominal pain, acute, nonlocalized / [COMMENTS]-Colonoscopy yesterday, abdominal pain with nausea vomiting today     TECHNICAL FACTORS: Multiple contiguous axial CT images were obtained of the abdomen and pelvis after administration of intravenous contrast. 2D reformatted images were performed. Automated exposure control was utilized for radiation dose reduction.     COMPARISON:  None     FINDINGS:   Soft tissues/Musculature: Low midline abdominal incision.   Osseous Structures: Degenerative changes of the visualized spine.     Lung bases: Unremarkable   Aorta/Vasculature: Moderate to severe atherosclerotic disease.   Lymph nodes/Mesentery: Unremarkable   Liver: Unremarkable   Gallbladder/Biliary System: Prior cholecystectomy.   Pancreas: Unremarkable   Spleen: Unremarkable   Adrenal glands: Unremarkable   Kidneys/Ureters: Unremarkable   Urinary bladder: Unremarkable   Reproductive organs: Uterus is not well visualized, either due to prior hysterectomy or uterine atrophy. Left ovarian cyst.   Peritoneum: Unremarkable   Bowel: Colonic anastomotic suture. Normal caliber large and small bowel. No wall thickening.     IMPRESSION:   No acute abnormalities of the abdomen or pelvis.         Electronically signed by Anuj Lawton MD on 9/22/2023 11:52 AM  Procedure Note    Anuj Lawton MD - 09/22/2023   Formatting of this note might be different from the original.   REASON FOR EXAM: Abdominal pain, acute, nonlocalized / [COMMENTS]-Colonoscopy yesterday, abdominal pain with nausea vomiting today     TECHNICAL FACTORS: Multiple contiguous axial CT images were obtained of the abdomen and pelvis after administration of intravenous contrast. 2D reformatted images were performed. Automated exposure control was utilized for radiation dose reduction.     COMPARISON: None     FINDINGS:   Soft tissues/Musculature: Low midline abdominal incision.   Osseous Structures: Degenerative changes of the visualized spine.     Lung bases: Unremarkable   Aorta/Vasculature: Moderate to severe atherosclerotic disease.   Lymph nodes/Mesentery: Unremarkable   Liver: Unremarkable   Gallbladder/Biliary System: Prior cholecystectomy.   Pancreas: Unremarkable   Spleen: Unremarkable   Adrenal glands: Unremarkable   Kidneys/Ureters: Unremarkable   Urinary bladder: Unremarkable   Reproductive organs: Uterus is not well  visualized, either due to prior hysterectomy or uterine atrophy. Left ovarian cyst.   Peritoneum: Unremarkable   Bowel: Colonic anastomotic suture. Normal caliber large and small bowel. No wall thickening.     IMPRESSION:   No acute abnormalities of the abdomen or pelvis.         Electronically signed by Anuj Lawton MD on 9/22/2023 11:52 AM  Exam End: 09/22/23 11:18    Specimen Collected: 09/22/23 11:45 Last Resulted: 09/22/23 11:52   Received From: [facility]  Result Received: 09/28/23 12:54     Results for orders placed or performed in visit on 09/25/23   CBC Auto Differential   Result Value Ref Range    WBC 3.86 (L) 3.90 - 12.70 K/uL    RBC 4.72 4.00 - 5.40 M/uL    Hemoglobin 15.0 12.0 - 16.0 g/dL    Hematocrit 43.1 37.0 - 48.5 %    MCV 91 82 - 98 fL    MCH 31.8 (H) 27.0 - 31.0 pg    MCHC 34.8 32.0 - 36.0 g/dL    RDW 13.5 11.5 - 14.5 %    Platelets 137 (L) 150 - 450 K/uL    MPV 11.1 9.2 - 12.9 fL    Immature Granulocytes 0.0 0.0 - 0.5 %    Gran # (ANC) 2.0 1.8 - 7.7 K/uL    Immature Grans (Abs) 0.00 0.00 - 0.04 K/uL    Lymph # 1.4 1.0 - 4.8 K/uL    Mono # 0.3 0.3 - 1.0 K/uL    Eos # 0.1 0.0 - 0.5 K/uL    Baso # 0.01 0.00 - 0.20 K/uL    nRBC 0 0 /100 WBC    Gran % 52.8 38.0 - 73.0 %    Lymph % 36.3 18.0 - 48.0 %    Mono % 8.8 4.0 - 15.0 %    Eosinophil % 1.8 0.0 - 8.0 %    Basophil % 0.3 0.0 - 1.9 %    Differential Method Automated    Comprehensive Metabolic Panel   Result Value Ref Range    Sodium 140 136 - 145 mmol/L    Potassium 3.8 3.5 - 5.1 mmol/L    Chloride 100 95 - 110 mmol/L    CO2 30 (H) 23 - 29 mmol/L    Glucose 129 (H) 70 - 110 mg/dL    BUN 7 (L) 8 - 23 mg/dL    Creatinine 0.6 0.5 - 1.4 mg/dL    Calcium 9.9 8.7 - 10.5 mg/dL    Total Protein 7.7 6.0 - 8.4 g/dL    Albumin 4.4 3.5 - 5.2 g/dL    Total Bilirubin 0.5 0.1 - 1.0 mg/dL    Alkaline Phosphatase 52 (L) 55 - 135 U/L    AST 35 10 - 40 U/L    ALT 36 10 - 44 U/L    eGFR >60.0 >60 mL/min/1.73 m^2    Anion Gap 10 8 - 16 mmol/L    C-Reactive Protein   Result Value Ref Range    CRP 0.4 0.0 - 8.2 mg/L   Sedimentation rate   Result Value Ref Range    Sed Rate 17 0 - 20 mm/Hr   Vitamin B12   Result Value Ref Range    Vitamin B-12 >2000 (H) 210 - 950 pg/mL     reviewed labs with patient during this visit           Assessment:       1. Herpes zoster with other complication    2. Hypertension, unspecified type    3. Memory difficulty    4. Ankylosing spondylitis, unspecified site of spine    5. Sjogren's syndrome, with unspecified organ involvement    6. Leukopenia, unspecified type    7. Thrombocytopenia    8. Cervical spinal stenosis    9. Chronic pain syndrome    10. Nausea and vomiting, unspecified vomiting type                Plan:       Herpes zoster with other complication  -     valACYclovir (VALTREX) 1000 MG tablet; Take 1 tablet (1,000 mg total) by mouth 3 (three) times daily.  Dispense: 42 tablet; Refill: 2  -     HYDROcodone-acetaminophen (NORCO)  mg per tablet; Take 1 tablet by mouth every 8 (eight) hours as needed for Pain.  Dispense: 90 tablet; Refill: 0  -     HYDROcodone-acetaminophen (NORCO)  mg per tablet; Take 1 tablet by mouth every 8 (eight) hours as needed for Pain.  Dispense: 90 tablet; Refill: 0  -     ondansetron (ZOFRAN-ODT) 4 MG TbDL; Take 1 tablet (4 mg total) by mouth every 8 (eight) hours as needed (n/v).  Dispense: 45 tablet; Refill: 0  -     methylPREDNISolone acetate injection 80 mg  -     ketorolac injection 60 mg  -     cyanocobalamin injection 1,000 mcg  -     Sedimentation rate; Future; Expected date: 09/28/2023  -     C-Reactive Protein; Future; Expected date: 09/28/2023  -     Comprehensive Metabolic Panel; Future; Expected date: 09/28/2023  -     CBC Auto Differential; Future; Expected date: 09/28/2023    Hypertension, unspecified type  -     lisinopriL (PRINIVIL,ZESTRIL) 5 MG tablet; Take 1 tablet (5 mg total) by mouth once daily.  Dispense: 90 tablet; Refill: 3  -     HYDROcodone-acetaminophen  (NORCO)  mg per tablet; Take 1 tablet by mouth every 8 (eight) hours as needed for Pain.  Dispense: 90 tablet; Refill: 0  -     HYDROcodone-acetaminophen (NORCO)  mg per tablet; Take 1 tablet by mouth every 8 (eight) hours as needed for Pain.  Dispense: 90 tablet; Refill: 0  -     ondansetron (ZOFRAN-ODT) 4 MG TbDL; Take 1 tablet (4 mg total) by mouth every 8 (eight) hours as needed (n/v).  Dispense: 45 tablet; Refill: 0  -     methylPREDNISolone acetate injection 80 mg  -     ketorolac injection 60 mg  -     cyanocobalamin injection 1,000 mcg  -     Sedimentation rate; Future; Expected date: 09/28/2023  -     C-Reactive Protein; Future; Expected date: 09/28/2023  -     Comprehensive Metabolic Panel; Future; Expected date: 09/28/2023  -     CBC Auto Differential; Future; Expected date: 09/28/2023    Memory difficulty  -     HYDROcodone-acetaminophen (NORCO)  mg per tablet; Take 1 tablet by mouth every 8 (eight) hours as needed for Pain.  Dispense: 90 tablet; Refill: 0  -     HYDROcodone-acetaminophen (NORCO)  mg per tablet; Take 1 tablet by mouth every 8 (eight) hours as needed for Pain.  Dispense: 90 tablet; Refill: 0  -     ondansetron (ZOFRAN-ODT) 4 MG TbDL; Take 1 tablet (4 mg total) by mouth every 8 (eight) hours as needed (n/v).  Dispense: 45 tablet; Refill: 0  -     methylPREDNISolone acetate injection 80 mg  -     ketorolac injection 60 mg  -     cyanocobalamin injection 1,000 mcg  -     Sedimentation rate; Future; Expected date: 09/28/2023  -     C-Reactive Protein; Future; Expected date: 09/28/2023  -     Comprehensive Metabolic Panel; Future; Expected date: 09/28/2023  -     CBC Auto Differential; Future; Expected date: 09/28/2023    Ankylosing spondylitis, unspecified site of spine  -     HYDROcodone-acetaminophen (NORCO)  mg per tablet; Take 1 tablet by mouth every 8 (eight) hours as needed for Pain.  Dispense: 90 tablet; Refill: 0  -     HYDROcodone-acetaminophen (NORCO)   mg per tablet; Take 1 tablet by mouth every 8 (eight) hours as needed for Pain.  Dispense: 90 tablet; Refill: 0  -     ondansetron (ZOFRAN-ODT) 4 MG TbDL; Take 1 tablet (4 mg total) by mouth every 8 (eight) hours as needed (n/v).  Dispense: 45 tablet; Refill: 0  -     methylPREDNISolone acetate injection 80 mg  -     ketorolac injection 60 mg  -     cyanocobalamin injection 1,000 mcg  -     Sedimentation rate; Future; Expected date: 09/28/2023  -     C-Reactive Protein; Future; Expected date: 09/28/2023  -     Comprehensive Metabolic Panel; Future; Expected date: 09/28/2023  -     CBC Auto Differential; Future; Expected date: 09/28/2023  -     hydroxychloroquine (PLAQUENIL) 200 mg tablet; Take 1 tablet (200 mg total) by mouth once daily.  Dispense: 30 tablet; Refill: 6    Sjogren's syndrome, with unspecified organ involvement  -     HYDROcodone-acetaminophen (NORCO)  mg per tablet; Take 1 tablet by mouth every 8 (eight) hours as needed for Pain.  Dispense: 90 tablet; Refill: 0  -     HYDROcodone-acetaminophen (NORCO)  mg per tablet; Take 1 tablet by mouth every 8 (eight) hours as needed for Pain.  Dispense: 90 tablet; Refill: 0  -     ondansetron (ZOFRAN-ODT) 4 MG TbDL; Take 1 tablet (4 mg total) by mouth every 8 (eight) hours as needed (n/v).  Dispense: 45 tablet; Refill: 0  -     methylPREDNISolone acetate injection 80 mg  -     ketorolac injection 60 mg  -     cyanocobalamin injection 1,000 mcg  -     Sedimentation rate; Future; Expected date: 09/28/2023  -     C-Reactive Protein; Future; Expected date: 09/28/2023  -     Comprehensive Metabolic Panel; Future; Expected date: 09/28/2023  -     CBC Auto Differential; Future; Expected date: 09/28/2023  -     hydroxychloroquine (PLAQUENIL) 200 mg tablet; Take 1 tablet (200 mg total) by mouth once daily.  Dispense: 30 tablet; Refill: 6    Leukopenia, unspecified type  -     methylPREDNISolone acetate injection 80 mg  -     ketorolac injection 60  mg  -     cyanocobalamin injection 1,000 mcg  -     Sedimentation rate; Future; Expected date: 09/28/2023  -     C-Reactive Protein; Future; Expected date: 09/28/2023  -     Comprehensive Metabolic Panel; Future; Expected date: 09/28/2023  -     CBC Auto Differential; Future; Expected date: 09/28/2023  -     hydroxychloroquine (PLAQUENIL) 200 mg tablet; Take 1 tablet (200 mg total) by mouth once daily.  Dispense: 30 tablet; Refill: 6    Thrombocytopenia  -     methylPREDNISolone acetate injection 80 mg  -     ketorolac injection 60 mg  -     cyanocobalamin injection 1,000 mcg  -     Sedimentation rate; Future; Expected date: 09/28/2023  -     C-Reactive Protein; Future; Expected date: 09/28/2023  -     Comprehensive Metabolic Panel; Future; Expected date: 09/28/2023  -     CBC Auto Differential; Future; Expected date: 09/28/2023    Cervical spinal stenosis  -     methylPREDNISolone acetate injection 80 mg  -     ketorolac injection 60 mg  -     cyanocobalamin injection 1,000 mcg  -     hydroxychloroquine (PLAQUENIL) 200 mg tablet; Take 1 tablet (200 mg total) by mouth once daily.  Dispense: 30 tablet; Refill: 6    Chronic pain syndrome  -     HYDROcodone-acetaminophen (NORCO)  mg per tablet; Take 1 tablet by mouth every 8 (eight) hours as needed for Pain.  Dispense: 90 tablet; Refill: 0  -     HYDROcodone-acetaminophen (NORCO)  mg per tablet; Take 1 tablet by mouth every 8 (eight) hours as needed for Pain.  Dispense: 90 tablet; Refill: 0  -     ondansetron (ZOFRAN-ODT) 4 MG TbDL; Take 1 tablet (4 mg total) by mouth every 8 (eight) hours as needed (n/v).  Dispense: 45 tablet; Refill: 0  -     methylPREDNISolone acetate injection 80 mg  -     ketorolac injection 60 mg  -     cyanocobalamin injection 1,000 mcg    Nausea and vomiting, unspecified vomiting type  -     HYDROcodone-acetaminophen (NORCO)  mg per tablet; Take 1 tablet by mouth every 8 (eight) hours as needed for Pain.  Dispense: 90 tablet;  Refill: 0  -     HYDROcodone-acetaminophen (NORCO)  mg per tablet; Take 1 tablet by mouth every 8 (eight) hours as needed for Pain.  Dispense: 90 tablet; Refill: 0  -     ondansetron (ZOFRAN-ODT) 4 MG TbDL; Take 1 tablet (4 mg total) by mouth every 8 (eight) hours as needed (n/v).  Dispense: 45 tablet; Refill: 0  -     methylPREDNISolone acetate injection 80 mg  -     ketorolac injection 60 mg  -     cyanocobalamin injection 1,000 mcg          Assessment:  62 year old female with  Ankylosing spondylitis, RA, PSA, Sjogren's  --leukopenia, thrombocytopenia  --controlled type 2 diabetes  --chronic pain syndrome  --depression on paxil    Plan:  Toradol 60, depo 80, b12  Labs  Hematology referral  GYN referral  Start Cosentyx 150 mg weekly x 5 doses then monthly. Cont plaquenil  Start prednisone 3 mg prn for flares  Increase lyrica 25 mg TID  Cont norco  I have checked [place] prescription monitoring program site and no unusual or abnormal behavior has occurred pt understand the risk and benefits of taking opioid medications and has decided to continue the medication.    More than 50% of the  40 minute encounter was spent face to face counseling the patient regarding current status and future plan of care as well as side effects  of the medications. All questions were answered to patient's satisfaction also includes  non-face to face time preparing to see the patient (eg, review of tests), Obtaining and/or reviewing separately obtained history, Documenting clinical information in the electronic or other health record, Independently interpreting results

## 2023-10-17 DIAGNOSIS — F41.9 ANXIETY: ICD-10-CM

## 2023-10-17 RX ORDER — PAROXETINE 10 MG/1
10 TABLET, FILM COATED ORAL NIGHTLY
Qty: 30 TABLET | Refills: 5 | Status: SHIPPED | OUTPATIENT
Start: 2023-10-17

## 2023-10-17 NOTE — TELEPHONE ENCOUNTER
Pharmacy requesting refill on Paroxetine 10mg  Pt's LOV 09/28/2023  Pt's NOV 04/12/2024  Medication pending

## 2023-11-10 DIAGNOSIS — K21.9 GERD WITHOUT ESOPHAGITIS: ICD-10-CM

## 2023-11-11 RX ORDER — DICYCLOMINE HYDROCHLORIDE 10 MG/1
20 CAPSULE ORAL 4 TIMES DAILY
Qty: 240 CAPSULE | Refills: 3 | Status: SHIPPED | OUTPATIENT
Start: 2023-11-11

## 2023-11-14 DIAGNOSIS — K21.9 GERD WITHOUT ESOPHAGITIS: ICD-10-CM

## 2023-11-14 RX ORDER — PANTOPRAZOLE SODIUM 40 MG/1
40 TABLET, DELAYED RELEASE ORAL DAILY
Qty: 30 TABLET | Refills: 5 | Status: SHIPPED | OUTPATIENT
Start: 2023-11-14

## 2023-11-14 NOTE — TELEPHONE ENCOUNTER
Pharmacy requesting refill on Pantoprazole 40mg  Pt's LOV 09/28/2023  Pt's NOV 04/12/2024  Medication pending

## 2023-12-19 DIAGNOSIS — M48.062 SPINAL STENOSIS OF LUMBAR REGION WITH NEUROGENIC CLAUDICATION: ICD-10-CM

## 2023-12-19 DIAGNOSIS — E06.3 HASHIMOTO'S THYROIDITIS: ICD-10-CM

## 2023-12-19 DIAGNOSIS — M35.00 SJOGREN'S SYNDROME, WITH UNSPECIFIED ORGAN INVOLVEMENT: ICD-10-CM

## 2023-12-19 DIAGNOSIS — M48.02 CERVICAL SPINAL STENOSIS: ICD-10-CM

## 2023-12-19 DIAGNOSIS — B37.0 THRUSH OF MOUTH AND ESOPHAGUS: ICD-10-CM

## 2023-12-19 DIAGNOSIS — K21.9 GERD WITHOUT ESOPHAGITIS: ICD-10-CM

## 2023-12-19 DIAGNOSIS — B37.81 THRUSH OF MOUTH AND ESOPHAGUS: ICD-10-CM

## 2023-12-19 DIAGNOSIS — D69.3 ACUTE ITP: ICD-10-CM

## 2023-12-19 DIAGNOSIS — M35.1 MCTD (MIXED CONNECTIVE TISSUE DISEASE): ICD-10-CM

## 2023-12-19 DIAGNOSIS — M45.9 ANKYLOSING SPONDYLITIS, UNSPECIFIED SITE OF SPINE: ICD-10-CM

## 2023-12-19 DIAGNOSIS — R76.8 ANA POSITIVE: ICD-10-CM

## 2023-12-19 DIAGNOSIS — G89.4 CHRONIC PAIN SYNDROME: ICD-10-CM

## 2023-12-19 DIAGNOSIS — M47.12 CERVICAL ARTHRITIS WITH MYELOPATHY: ICD-10-CM

## 2023-12-19 RX ORDER — PREGABALIN 25 MG/1
CAPSULE ORAL
Qty: 90 CAPSULE | Refills: 3 | Status: SHIPPED | OUTPATIENT
Start: 2023-12-19

## 2024-01-22 DIAGNOSIS — M35.00 SJOGREN'S SYNDROME, WITH UNSPECIFIED ORGAN INVOLVEMENT: ICD-10-CM

## 2024-01-22 RX ORDER — CYCLOBENZAPRINE HCL 10 MG
10 TABLET ORAL 3 TIMES DAILY PRN
Qty: 90 TABLET | Refills: 5 | Status: SHIPPED | OUTPATIENT
Start: 2024-01-22

## 2024-01-22 NOTE — TELEPHONE ENCOUNTER
Pharmacy requesting refill on Cyclobenzaprine 10mg  Pt's LOV 09/28/2023  Pt's NOV 04/12/2024  Medication pending

## 2024-03-20 DIAGNOSIS — M45.9 ANKYLOSING SPONDYLITIS, UNSPECIFIED SITE OF SPINE: ICD-10-CM

## 2024-03-21 RX ORDER — SECUKINUMAB 150 MG/ML
150 INJECTION SUBCUTANEOUS
Qty: 1 ML | Refills: 11 | Status: ACTIVE | OUTPATIENT
Start: 2024-03-21 | End: 2024-05-01 | Stop reason: SDUPTHER

## 2024-03-25 DIAGNOSIS — M35.1 MCTD (MIXED CONNECTIVE TISSUE DISEASE): ICD-10-CM

## 2024-03-25 NOTE — TELEPHONE ENCOUNTER
Pharmacy requesting refill on Folbee   Pt's LOV 09/28/2023  Pt's NOV 04/12/2024  Medication pending

## 2024-04-04 PROBLEM — M45.9 ANKYLOSING SPONDYLITIS: Status: ACTIVE | Noted: 2024-04-04

## 2024-04-05 ENCOUNTER — LAB VISIT (OUTPATIENT)
Dept: LAB | Facility: HOSPITAL | Age: 64
End: 2024-04-05
Attending: INTERNAL MEDICINE
Payer: MEDICARE

## 2024-04-05 DIAGNOSIS — M45.9 ANKYLOSING SPONDYLITIS, UNSPECIFIED SITE OF SPINE: ICD-10-CM

## 2024-04-05 DIAGNOSIS — B02.8 HERPES ZOSTER WITH OTHER COMPLICATION: ICD-10-CM

## 2024-04-05 DIAGNOSIS — M35.00 SJOGREN'S SYNDROME, WITH UNSPECIFIED ORGAN INVOLVEMENT: ICD-10-CM

## 2024-04-05 DIAGNOSIS — R41.3 MEMORY DIFFICULTY: ICD-10-CM

## 2024-04-05 DIAGNOSIS — I10 HYPERTENSION, UNSPECIFIED TYPE: ICD-10-CM

## 2024-04-05 DIAGNOSIS — D72.819 LEUKOPENIA, UNSPECIFIED TYPE: ICD-10-CM

## 2024-04-05 DIAGNOSIS — D69.6 THROMBOCYTOPENIA: ICD-10-CM

## 2024-04-05 LAB
ALBUMIN SERPL BCP-MCNC: 3.8 G/DL (ref 3.5–5.2)
ALP SERPL-CCNC: 61 U/L (ref 55–135)
ALT SERPL W/O P-5'-P-CCNC: 58 U/L (ref 10–44)
ANION GAP SERPL CALC-SCNC: 9 MMOL/L (ref 8–16)
AST SERPL-CCNC: 48 U/L (ref 10–40)
BASOPHILS # BLD AUTO: 0.03 K/UL (ref 0–0.2)
BASOPHILS NFR BLD: 0.4 % (ref 0–1.9)
BILIRUB SERPL-MCNC: 0.2 MG/DL (ref 0.1–1)
BUN SERPL-MCNC: 24 MG/DL (ref 8–23)
CALCIUM SERPL-MCNC: 9.6 MG/DL (ref 8.7–10.5)
CHLORIDE SERPL-SCNC: 100 MMOL/L (ref 95–110)
CO2 SERPL-SCNC: 31 MMOL/L (ref 23–29)
CREAT SERPL-MCNC: 0.7 MG/DL (ref 0.5–1.4)
CRP SERPL-MCNC: 4.1 MG/L (ref 0–8.2)
DIFFERENTIAL METHOD BLD: NORMAL
EOSINOPHIL # BLD AUTO: 0.1 K/UL (ref 0–0.5)
EOSINOPHIL NFR BLD: 1.6 % (ref 0–8)
ERYTHROCYTE [DISTWIDTH] IN BLOOD BY AUTOMATED COUNT: 13.8 % (ref 11.5–14.5)
ERYTHROCYTE [SEDIMENTATION RATE] IN BLOOD BY WESTERGREN METHOD: 31 MM/HR (ref 0–20)
EST. GFR  (NO RACE VARIABLE): >60 ML/MIN/1.73 M^2
GLUCOSE SERPL-MCNC: 78 MG/DL (ref 70–110)
HCT VFR BLD AUTO: 41.8 % (ref 37–48.5)
HGB BLD-MCNC: 13.5 G/DL (ref 12–16)
IMM GRANULOCYTES # BLD AUTO: 0.02 K/UL (ref 0–0.04)
IMM GRANULOCYTES NFR BLD AUTO: 0.3 % (ref 0–0.5)
LYMPHOCYTES # BLD AUTO: 1.4 K/UL (ref 1–4.8)
LYMPHOCYTES NFR BLD: 20.9 % (ref 18–48)
MCH RBC QN AUTO: 30.5 PG (ref 27–31)
MCHC RBC AUTO-ENTMCNC: 32.3 G/DL (ref 32–36)
MCV RBC AUTO: 94 FL (ref 82–98)
MONOCYTES # BLD AUTO: 0.5 K/UL (ref 0.3–1)
MONOCYTES NFR BLD: 6.7 % (ref 4–15)
NEUTROPHILS # BLD AUTO: 4.7 K/UL (ref 1.8–7.7)
NEUTROPHILS NFR BLD: 70.1 % (ref 38–73)
NRBC BLD-RTO: 0 /100 WBC
PLATELET # BLD AUTO: 164 K/UL (ref 150–450)
PMV BLD AUTO: 10.5 FL (ref 9.2–12.9)
POTASSIUM SERPL-SCNC: 4 MMOL/L (ref 3.5–5.1)
PROT SERPL-MCNC: 7.2 G/DL (ref 6–8.4)
RBC # BLD AUTO: 4.43 M/UL (ref 4–5.4)
SODIUM SERPL-SCNC: 140 MMOL/L (ref 136–145)
WBC # BLD AUTO: 6.69 K/UL (ref 3.9–12.7)

## 2024-04-05 PROCEDURE — 85651 RBC SED RATE NONAUTOMATED: CPT | Mod: PO | Performed by: INTERNAL MEDICINE

## 2024-04-05 PROCEDURE — 80053 COMPREHEN METABOLIC PANEL: CPT | Performed by: INTERNAL MEDICINE

## 2024-04-05 PROCEDURE — 86140 C-REACTIVE PROTEIN: CPT | Performed by: INTERNAL MEDICINE

## 2024-04-05 PROCEDURE — 36415 COLL VENOUS BLD VENIPUNCTURE: CPT | Mod: PO | Performed by: INTERNAL MEDICINE

## 2024-04-05 PROCEDURE — 85025 COMPLETE CBC W/AUTO DIFF WBC: CPT | Performed by: INTERNAL MEDICINE

## 2024-04-12 ENCOUNTER — OFFICE VISIT (OUTPATIENT)
Dept: RHEUMATOLOGY | Facility: CLINIC | Age: 64
End: 2024-04-12
Payer: MEDICARE

## 2024-04-12 VITALS
DIASTOLIC BLOOD PRESSURE: 62 MMHG | SYSTOLIC BLOOD PRESSURE: 143 MMHG | BODY MASS INDEX: 22.29 KG/M2 | WEIGHT: 113.56 LBS | HEART RATE: 82 BPM | HEIGHT: 60 IN

## 2024-04-12 DIAGNOSIS — I10 HYPERTENSION, UNSPECIFIED TYPE: Primary | ICD-10-CM

## 2024-04-12 DIAGNOSIS — E06.3 HASHIMOTO'S THYROIDITIS: ICD-10-CM

## 2024-04-12 DIAGNOSIS — E55.9 VITAMIN D DEFICIENCY, UNSPECIFIED: ICD-10-CM

## 2024-04-12 DIAGNOSIS — M48.02 CERVICAL SPINAL STENOSIS: ICD-10-CM

## 2024-04-12 DIAGNOSIS — D69.3 ACUTE ITP: ICD-10-CM

## 2024-04-12 DIAGNOSIS — B37.81 THRUSH OF MOUTH AND ESOPHAGUS: ICD-10-CM

## 2024-04-12 DIAGNOSIS — M45.9 ANKYLOSING SPONDYLITIS, UNSPECIFIED SITE OF SPINE: ICD-10-CM

## 2024-04-12 DIAGNOSIS — M47.12 CERVICAL ARTHRITIS WITH MYELOPATHY: ICD-10-CM

## 2024-04-12 DIAGNOSIS — B37.0 THRUSH OF MOUTH AND ESOPHAGUS: ICD-10-CM

## 2024-04-12 DIAGNOSIS — M48.062 SPINAL STENOSIS OF LUMBAR REGION WITH NEUROGENIC CLAUDICATION: ICD-10-CM

## 2024-04-12 DIAGNOSIS — M35.1 MCTD (MIXED CONNECTIVE TISSUE DISEASE): ICD-10-CM

## 2024-04-12 DIAGNOSIS — F41.9 ANXIETY: ICD-10-CM

## 2024-04-12 DIAGNOSIS — M35.00 SJOGREN'S SYNDROME, WITH UNSPECIFIED ORGAN INVOLVEMENT: ICD-10-CM

## 2024-04-12 DIAGNOSIS — R76.8 ANA POSITIVE: ICD-10-CM

## 2024-04-12 DIAGNOSIS — E11.69 TYPE 2 DIABETES MELLITUS WITH OTHER SPECIFIED COMPLICATION, UNSPECIFIED WHETHER LONG TERM INSULIN USE: ICD-10-CM

## 2024-04-12 DIAGNOSIS — D72.819 LEUKOPENIA, UNSPECIFIED TYPE: ICD-10-CM

## 2024-04-12 DIAGNOSIS — K21.9 GERD WITHOUT ESOPHAGITIS: ICD-10-CM

## 2024-04-12 DIAGNOSIS — N93.9 VAGINAL BLEEDING: ICD-10-CM

## 2024-04-12 DIAGNOSIS — G89.4 CHRONIC PAIN SYNDROME: ICD-10-CM

## 2024-04-12 PROCEDURE — 1101F PT FALLS ASSESS-DOCD LE1/YR: CPT | Mod: CPTII,S$GLB,, | Performed by: INTERNAL MEDICINE

## 2024-04-12 PROCEDURE — 3008F BODY MASS INDEX DOCD: CPT | Mod: CPTII,S$GLB,, | Performed by: INTERNAL MEDICINE

## 2024-04-12 PROCEDURE — 4010F ACE/ARB THERAPY RXD/TAKEN: CPT | Mod: CPTII,S$GLB,, | Performed by: INTERNAL MEDICINE

## 2024-04-12 PROCEDURE — 3288F FALL RISK ASSESSMENT DOCD: CPT | Mod: CPTII,S$GLB,, | Performed by: INTERNAL MEDICINE

## 2024-04-12 PROCEDURE — 96372 THER/PROPH/DIAG INJ SC/IM: CPT | Mod: S$GLB,,, | Performed by: INTERNAL MEDICINE

## 2024-04-12 PROCEDURE — 1160F RVW MEDS BY RX/DR IN RCRD: CPT | Mod: CPTII,S$GLB,, | Performed by: INTERNAL MEDICINE

## 2024-04-12 PROCEDURE — 99215 OFFICE O/P EST HI 40 MIN: CPT | Mod: 25,S$GLB,, | Performed by: INTERNAL MEDICINE

## 2024-04-12 PROCEDURE — 3077F SYST BP >= 140 MM HG: CPT | Mod: CPTII,S$GLB,, | Performed by: INTERNAL MEDICINE

## 2024-04-12 PROCEDURE — 3078F DIAST BP <80 MM HG: CPT | Mod: CPTII,S$GLB,, | Performed by: INTERNAL MEDICINE

## 2024-04-12 PROCEDURE — 99999 PR PBB SHADOW E&M-EST. PATIENT-LVL V: CPT | Mod: PBBFAC,,, | Performed by: INTERNAL MEDICINE

## 2024-04-12 PROCEDURE — 1159F MED LIST DOCD IN RCRD: CPT | Mod: CPTII,S$GLB,, | Performed by: INTERNAL MEDICINE

## 2024-04-12 PROCEDURE — 3044F HG A1C LEVEL LT 7.0%: CPT | Mod: CPTII,S$GLB,, | Performed by: INTERNAL MEDICINE

## 2024-04-12 RX ORDER — TRAZODONE HYDROCHLORIDE 100 MG/1
100 TABLET ORAL NIGHTLY
Qty: 90 TABLET | Refills: 3 | Status: SHIPPED | OUTPATIENT
Start: 2024-04-12 | End: 2025-04-12

## 2024-04-12 RX ORDER — PAROXETINE 10 MG/1
10 TABLET, FILM COATED ORAL NIGHTLY
Qty: 90 TABLET | Refills: 3 | Status: SHIPPED | OUTPATIENT
Start: 2024-04-12 | End: 2025-04-12

## 2024-04-12 RX ORDER — HYDROCODONE BITARTRATE AND ACETAMINOPHEN 10; 325 MG/1; MG/1
1 TABLET ORAL EVERY 8 HOURS PRN
Qty: 90 TABLET | Refills: 0 | Status: SHIPPED | OUTPATIENT
Start: 2024-04-12 | End: 2024-05-12

## 2024-04-12 RX ORDER — DICYCLOMINE HYDROCHLORIDE 10 MG/1
20 CAPSULE ORAL 4 TIMES DAILY PRN
Qty: 240 CAPSULE | Refills: 3
Start: 2024-04-12

## 2024-04-12 RX ORDER — PREGABALIN 25 MG/1
25 CAPSULE ORAL 3 TIMES DAILY
Qty: 90 CAPSULE | Refills: 3 | Status: SHIPPED | OUTPATIENT
Start: 2024-04-12

## 2024-04-12 RX ORDER — HYDROXYCHLOROQUINE SULFATE 200 MG/1
200 TABLET, FILM COATED ORAL DAILY
Qty: 90 TABLET | Refills: 3 | Status: SHIPPED | OUTPATIENT
Start: 2024-04-12 | End: 2025-04-12

## 2024-04-12 RX ORDER — CYANOCOBALAMIN 1000 UG/ML
1000 INJECTION, SOLUTION INTRAMUSCULAR; SUBCUTANEOUS
Status: COMPLETED | OUTPATIENT
Start: 2024-04-12 | End: 2024-04-12

## 2024-04-12 RX ORDER — KETOROLAC TROMETHAMINE 30 MG/ML
60 INJECTION, SOLUTION INTRAMUSCULAR; INTRAVENOUS
Status: COMPLETED | OUTPATIENT
Start: 2024-04-12 | End: 2024-04-12

## 2024-04-12 RX ORDER — METFORMIN HYDROCHLORIDE 500 MG/1
500 TABLET, EXTENDED RELEASE ORAL
Qty: 90 TABLET | Refills: 3 | Status: SHIPPED | OUTPATIENT
Start: 2024-04-12 | End: 2025-04-12

## 2024-04-12 RX ORDER — METRONIDAZOLE 500 MG/1
TABLET ORAL
COMMUNITY
Start: 2024-03-27

## 2024-04-12 RX ORDER — METHYLPREDNISOLONE ACETATE 80 MG/ML
80 INJECTION, SUSPENSION INTRA-ARTICULAR; INTRALESIONAL; INTRAMUSCULAR; SOFT TISSUE
Status: COMPLETED | OUTPATIENT
Start: 2024-04-12 | End: 2024-04-12

## 2024-04-12 RX ADMIN — CYANOCOBALAMIN 1000 MCG: 1000 INJECTION, SOLUTION INTRAMUSCULAR; SUBCUTANEOUS at 12:04

## 2024-04-12 RX ADMIN — METHYLPREDNISOLONE ACETATE 80 MG: 80 INJECTION, SUSPENSION INTRA-ARTICULAR; INTRALESIONAL; INTRAMUSCULAR; SOFT TISSUE at 12:04

## 2024-04-12 RX ADMIN — KETOROLAC TROMETHAMINE 60 MG: 30 INJECTION, SOLUTION INTRAMUSCULAR; INTRAVENOUS at 12:04

## 2024-04-12 ASSESSMENT — ROUTINE ASSESSMENT OF PATIENT INDEX DATA (RAPID3)
MDHAQ FUNCTION SCORE: 1.9
FATIGUE SCORE: 2.2
TOTAL RAPID3 SCORE: 5.94
PSYCHOLOGICAL DISTRESS SCORE: 4.4
PAIN SCORE: 5.5
PATIENT GLOBAL ASSESSMENT SCORE: 6

## 2024-04-12 NOTE — PROGRESS NOTES
Subjective:     Patient ID:  Malu Holland    Chief Complaint:  Disease Management     History of Present Illness:  Follow up:  62 year old female who presents to clinic for follow up on ankylosing spondylitis and sjgoren's syndrome. She is on cosentyx 150/ she had a colonscopy  and post had severe nausea and vomiting.She reports increased pain in her shoulders, ribs, clavicle, and sternum that is reproducible w/palpation.  She bent forward and fell over and landed on her buttock and reports increased R low back pain. She complains of memory concerns and difficulty with word finding and short term memory loss. Her family members have noticed this change in her.   She denies rash. Complains of fevers  x 5-1 years.        Rheumatologic History:   - Diagnosis/es:  - Positive serologies:  - Infectious screening labs:  - Previous Treatments:  - Current Treatments:     Interval History:   Hospitalization since last office visit: No    Patient Active Problem List    Diagnosis Date Noted    Ankylosing spondylitis 2024     Past Surgical History:   Procedure Laterality Date    ADENOIDECTOMY       SECTION      CHOLECYSTECTOMY      COLON SURGERY      HYSTERECTOMY      LASIK      TONSILLECTOMY       Social History     Tobacco Use    Smoking status: Former     Types: Vaping with nicotine     Quit date: 2012     Years since quittin.3    Smokeless tobacco: Never   Substance Use Topics    Alcohol use: Not Currently     Comment: sober for 4 years    Drug use: Never     Family History   Problem Relation Age of Onset    Lupus Mother     Osteoarthritis Mother     Heart disease Mother     Heart disease Father     Cancer Father     Lupus Sister      Review of patient's allergies indicates:   Allergen Reactions    Cymbalta [duloxetine] Other (See Comments)     Altered mental status       Review of Systems   Review of Systems     Current Medications:  Current Outpatient Medications   Medication Instructions     aloe vera 5,000 mg Cap Oral    aspirin (ECOTRIN) 81 mg, Daily    aspirin 325 mg, Oral    calcium carbonate (OS-LUCIANO) 600 mg, Oral, Daily    cetirizine (ZYRTEC) 10 mg, Oral, Daily PRN    cholecalciferol, vitamin D3, 125 mcg (5,000 unit) Tab Oral    COSENTYX  mg, Subcutaneous, Every 28 days    cyclobenzaprine (FLEXERIL) 10 mg, Oral, 3 times daily PRN    cycloSPORINE (RESTASIS) 0.05 % ophthalmic emulsion No dose, route, or frequency recorded.    diclofenac sodium (VOLTAREN) 2 g, Topical    dicyclomine (BENTYL) 20 mg, Oral, 4 times daily    docusate sodium (COLACE) 100 mg, Oral    fluconazole (DIFLUCAN) 150 mg, Oral, Once    folic acid-vit B6-vit B12 (FOLBEE) 2.5-25-1 mg Tab 1 tablet, Oral, Daily    glipiZIDE (GLUCOTROL) 5 mg, Oral    guaiFENesin (MUCINEX) 1,200 mg, Oral    hydroxychloroquine (PLAQUENIL) 200 mg, Oral, Daily    lisinopriL (PRINIVIL,ZESTRIL) 5 mg, Oral, Daily    lisinopril-hydrochlorothiazide (PRINZIDE,ZESTORETIC) 10-12.5 mg per tablet 1 tablet, Oral    LORazepam (ATIVAN) 0.5 mg, Oral    metroNIDAZOLE (FLAGYL) 500 MG tablet Take 1 tablet by mouth in the morning and 1 tablet in the evening and 1 tablet before bedtime. Do all this for 10 days.    [facility] ([facility] Misc Glucometer and strips#100    multivitamin (THERAGRAN) per tablet 1 tablet, Oral    nystatin (MYCOSTATIN) 100,000 unit/mL suspension No dose, route, or frequency recorded.    ondansetron (ZOFRAN-ODT) 4 mg, Oral, Every 8 hours PRN    pantoprazole (PROTONIX) 40 mg, Oral, Daily    paroxetine (PAXIL) 10 mg, Oral, Nightly    polyethylene glycol (GLYCOLAX) 17 g, Oral, Daily    pravastatin (PRAVACHOL) 40 mg, Oral    predniSONE (DELTASONE) 1 MG tablet TAKE 3 TABLETS BY MOUTH ONCE DAILY.    predniSONE (DELTASONE) 3 mg, Oral, Daily PRN    pregabalin (LYRICA) 25 MG capsule TAKE ONE CAPSULE BY MOUTH THREE TIMES DAILY    traZODone (DESYREL) 100 MG tablet TAKE 1 TABLET BY MOUTH NIGHTLY    triamcinolone acetonide 0.1% (KENALOG) 0.1 %  ointment Topical (Top), 2 times daily    valACYclovir (VALTREX) 1,000 mg, Oral, 3 times daily    zinc gluconate 50 mg, Oral         Objective:     Vitals:    04/12/24 1346   BP: (!) 143/62   Pulse: 82   Weight: 51.5 kg (113 lb 8.6 oz)   Height: 5' (1.524 m)   PainSc:   4   PainLoc: Generalized      Body mass index is 22.17 kg/m².     Physical Examinations:  Physical Exam     Disease Assessment Scores:  Patient's Global Assessment of arthritis (0-10): 6  Physician's Global Assessment of arthritis (0-10): 6  Number of Tender Joints (0-28): 5  Number of Swollen Joints (0-28): 5    There is currently no information documented on the homunculus. Go to the Rheumatology activity and complete the homunculus joint exam.          No data to display                Monitoring Lab Results:  Lab Results   Component Value Date    WBC 6.69 04/05/2024    RBC 4.43 04/05/2024    HGB 13.5 04/05/2024    HCT 41.8 04/05/2024    MCV 94 04/05/2024    MCH 30.5 04/05/2024    MCHC 32.3 04/05/2024    RDW 13.8 04/05/2024     04/05/2024        Lab Results   Component Value Date     04/05/2024    K 4.0 04/05/2024     04/05/2024    CO2 31 (H) 04/05/2024    GLU 78 04/05/2024    BUN 24 (H) 04/05/2024    CREATININE 0.7 04/05/2024    CALCIUM 9.6 04/05/2024    PROT 7.2 04/05/2024    ALBUMIN 3.8 04/05/2024    BILITOT 0.2 04/05/2024    ALKPHOS 61 04/05/2024    AST 48 (H) 04/05/2024    ALT 58 (H) 04/05/2024    ANIONGAP 9 04/05/2024    EGFRNORACEVR >60.0 04/05/2024       Lab Results   Component Value Date    SEDRATE 31 (H) 04/05/2024    CRP 4.1 04/05/2024        Lab Results   Component Value Date    BWWOWPSK65ZK 49 11/21/2019    KPKKQXDE83 >2000 (H) 09/25/2023        Lab Results   Component Value Date    CHOL 161 07/14/2023    HDL 61 (H) 07/14/2023    LDLCALC 87 07/14/2023    TRIG 65 07/14/2023       Lab Results   Component Value Date    RF <10.0 11/21/2019    CCPANTIBODIE <0.5 11/21/2019     Lab Results   Component Value Date     "ANASCREEN Negative <1:80 02/18/2020    DSDNA Negative 1:10 02/18/2020     Lab Results   Component Value Date    HLABB27 Negative 02/08/2022       Infectious Disease Screening:  Lab Results   Component Value Date    HEPBSAG Negative 02/08/2022    HEPBSAB Negative 02/08/2022    HEPBIGM Negative 02/08/2022     Lab Results   Component Value Date    HEPCAB Negative 02/08/2022     Lab Results   Component Value Date    TBGOLDPLUS Negative 02/08/2022     No results found for: "QUANTIFERON", "SVCMT", "QUANTAGVALUE", "QUANTNILVALU", "QUANTMITOGEN", "QFTTBAG", "QINT"     Imaging: DEXA, Xrays, MRIs, CTs, etc    Old & Outside Medical Records:  Reviewed old and all outside medical records available in Care Everywhere    Current Medication Changes:  Medication List with Changes/Refills   Current Medications    ALOE VERA 5,000 MG CAP    Take by mouth.    ASPIRIN (ECOTRIN) 81 MG EC TABLET    Take 81 mg by mouth once daily.     ASPIRIN 325 MG TABLET    Take 325 mg by mouth.    CALCIUM CARBONATE (OS-LUCIANO) 600 MG CALCIUM (1,500 MG) TAB    Take 600 mg by mouth once daily.    CETIRIZINE (ZYRTEC) 10 MG TABLET    Take 10 mg by mouth daily as needed.    CHOLECALCIFEROL, VITAMIN D3, 125 MCG (5,000 UNIT) TAB    Take by mouth.    CYCLOBENZAPRINE (FLEXERIL) 10 MG TABLET    Take 1 tablet (10 mg total) by mouth 3 (three) times daily as needed for Muscle spasms.    CYCLOSPORINE (RESTASIS) 0.05 % OPHTHALMIC EMULSION        DICLOFENAC SODIUM (VOLTAREN) 1 % GEL    Apply 2 g topically.    DICYCLOMINE (BENTYL) 10 MG CAPSULE    TAKE TWO CAPSULES BY MOUTH FOUR TIMES DAILY    DOCUSATE SODIUM (COLACE) 100 MG CAPSULE    Take 100 mg by mouth.    FLUCONAZOLE (DIFLUCAN) 150 MG TAB    Take 150 mg by mouth once.    FOLIC ACID-VIT B6-VIT B12 (FOLBEE) 2.5-25-1 MG TAB    Take 1 tablet by mouth once daily.    GLIPIZIDE (GLUCOTROL) 5 MG TABLET    Take 5 mg by mouth.    GUAIFENESIN (MUCINEX) 600 MG 12 HR TABLET    Take 1,200 mg by mouth.    HYDROXYCHLOROQUINE " (PLAQUENIL) 200 MG TABLET    Take 1 tablet (200 mg total) by mouth once daily.    LISINOPRIL (PRINIVIL,ZESTRIL) 5 MG TABLET    Take 1 tablet (5 mg total) by mouth once daily.    LISINOPRIL-HYDROCHLOROTHIAZIDE (PRINZIDE,ZESTORETIC) 10-12.5 MG PER TABLET    Take 1 tablet by mouth.    LORAZEPAM (ATIVAN) 0.5 MG TABLET    Take 0.5 mg by mouth.    METRONIDAZOLE (FLAGYL) 500 MG TABLET    Take 1 tablet by mouth in the morning and 1 tablet in the evening and 1 tablet before bedtime. Do all this for 10 days.    [employer] MEDICAL SUPPLY (C-TUB) MISC    Glucometer and strips#100    MULTIVITAMIN (THERAGRAN) PER TABLET    Take 1 tablet by mouth.    NYSTATIN (MYCOSTATIN) 100,000 UNIT/ML SUSPENSION        ONDANSETRON (ZOFRAN-ODT) 4 MG TBDL    Take 4 mg by mouth every 8 (eight) hours as needed.     PANTOPRAZOLE (PROTONIX) 40 MG TABLET    Take 1 tablet (40 mg total) by mouth once daily.    PAROXETINE (PAXIL) 10 MG TABLET    Take 1 tablet (10 mg total) by mouth every evening.    POLYETHYLENE GLYCOL (GLYCOLAX) 17 GRAM PWPK    Take 17 g by mouth once daily.    PRAVASTATIN (PRAVACHOL) 40 MG TABLET    Take 40 mg by mouth.    PREDNISONE (DELTASONE) 1 MG TABLET    TAKE 3 TABLETS BY MOUTH ONCE DAILY.    PREDNISONE (DELTASONE) 1 MG TABLET    Take 3 tablets (3 mg total) by mouth daily as needed (arthritis flare).    PREGABALIN (LYRICA) 25 MG CAPSULE    TAKE ONE CAPSULE BY MOUTH THREE TIMES DAILY    SECUKINUMAB (COSENTYX PEN) 150 MG/ML PNIJ    Inject 150 mg into the skin every 28 days.    TRAZODONE (DESYREL) 100 MG TABLET    TAKE 1 TABLET BY MOUTH NIGHTLY    TRIAMCINOLONE ACETONIDE 0.1% (KENALOG) 0.1 % OINTMENT    Apply topically 2 (two) times daily.    VALACYCLOVIR (VALTREX) 1000 MG TABLET    Take 1 tablet (1,000 mg total) by mouth 3 (three) times daily.    ZINC GLUCONATE 50 MG TABLET    Take 50 mg by mouth.        Assessment:     Encounter Diagnoses   Name Primary?    Hypertension, unspecified type Yes    Ankylosing spondylitis,  unspecified site of spine     Chronic pain syndrome     Vaginal bleeding     GERD without esophagitis     Sjogren's syndrome, with unspecified organ involvement     Leukopenia, unspecified type     Cervical spinal stenosis     Anxiety     Cervical arthritis with myelopathy     Hashimoto's thyroiditis     Spinal stenosis of lumbar region with neurogenic claudication     MCTD (mixed connective tissue disease)     Thrush of mouth and esophagus     BRENNON positive     Acute ITP          Plan:      Malu was seen today for disease management.    Diagnoses and all orders for this visit:    Hypertension, unspecified type  -     Ambulatory referral/consult to Internal Medicine; Future  -     methylPREDNISolone acetate injection 80 mg  -     ketorolac injection 60 mg  -     cyanocobalamin injection 1,000 mcg    Ankylosing spondylitis, unspecified site of spine  -     Ambulatory referral/consult to Internal Medicine; Future  -     hydroxychloroquine (PLAQUENIL) 200 mg tablet; Take 1 tablet (200 mg total) by mouth once daily.  -     paroxetine (PAXIL) 10 MG tablet; Take 1 tablet (10 mg total) by mouth every evening.  -     pregabalin (LYRICA) 25 MG capsule; Take 1 capsule (25 mg total) by mouth 3 (three) times daily.  -     traZODone (DESYREL) 100 MG tablet; Take 1 tablet (100 mg total) by mouth every evening.  -     HYDROcodone-acetaminophen (NORCO)  mg per tablet; Take 1 tablet by mouth every 8 (eight) hours as needed for Pain.  -     methylPREDNISolone acetate injection 80 mg  -     ketorolac injection 60 mg  -     cyanocobalamin injection 1,000 mcg  -     T4, Free; Future  -     TSH; Future  -     Vitamin D; Future  -     Cyclic Citrullinated Peptide Antibody, IgG; Future  -     Rheumatoid Factor; Future  -     Sedimentation rate; Future  -     C-Reactive Protein; Future  -     Comprehensive Metabolic Panel; Future  -     CBC Auto Differential; Future    Chronic pain syndrome  -     Ambulatory referral/consult to  Internal Medicine; Future  -     hydroxychloroquine (PLAQUENIL) 200 mg tablet; Take 1 tablet (200 mg total) by mouth once daily.  -     paroxetine (PAXIL) 10 MG tablet; Take 1 tablet (10 mg total) by mouth every evening.  -     pregabalin (LYRICA) 25 MG capsule; Take 1 capsule (25 mg total) by mouth 3 (three) times daily.  -     traZODone (DESYREL) 100 MG tablet; Take 1 tablet (100 mg total) by mouth every evening.  -     HYDROcodone-acetaminophen (NORCO)  mg per tablet; Take 1 tablet by mouth every 8 (eight) hours as needed for Pain.  -     methylPREDNISolone acetate injection 80 mg  -     ketorolac injection 60 mg  -     cyanocobalamin injection 1,000 mcg  -     T4, Free; Future  -     TSH; Future  -     Vitamin D; Future  -     Cyclic Citrullinated Peptide Antibody, IgG; Future  -     Rheumatoid Factor; Future  -     Sedimentation rate; Future  -     C-Reactive Protein; Future  -     Comprehensive Metabolic Panel; Future  -     CBC Auto Differential; Future    Vaginal bleeding  -     Ambulatory referral/consult to Obstetrics / Gynecology; Future    GERD without esophagitis  -     dicyclomine (BENTYL) 10 MG capsule; Take 2 capsules (20 mg total) by mouth 4 (four) times daily as needed (gi spasm).  -     pregabalin (LYRICA) 25 MG capsule; Take 1 capsule (25 mg total) by mouth 3 (three) times daily.    Sjogren's syndrome, with unspecified organ involvement  -     hydroxychloroquine (PLAQUENIL) 200 mg tablet; Take 1 tablet (200 mg total) by mouth once daily.  -     paroxetine (PAXIL) 10 MG tablet; Take 1 tablet (10 mg total) by mouth every evening.  -     pregabalin (LYRICA) 25 MG capsule; Take 1 capsule (25 mg total) by mouth 3 (three) times daily.  -     traZODone (DESYREL) 100 MG tablet; Take 1 tablet (100 mg total) by mouth every evening.  -     HYDROcodone-acetaminophen (NORCO)  mg per tablet; Take 1 tablet by mouth every 8 (eight) hours as needed for Pain.  -     T4, Free; Future  -     TSH;  Future  -     Vitamin D; Future  -     Cyclic Citrullinated Peptide Antibody, IgG; Future  -     Rheumatoid Factor; Future  -     Sedimentation rate; Future  -     C-Reactive Protein; Future  -     Comprehensive Metabolic Panel; Future  -     CBC Auto Differential; Future    Leukopenia, unspecified type  -     hydroxychloroquine (PLAQUENIL) 200 mg tablet; Take 1 tablet (200 mg total) by mouth once daily.  -     paroxetine (PAXIL) 10 MG tablet; Take 1 tablet (10 mg total) by mouth every evening.  -     pregabalin (LYRICA) 25 MG capsule; Take 1 capsule (25 mg total) by mouth 3 (three) times daily.  -     traZODone (DESYREL) 100 MG tablet; Take 1 tablet (100 mg total) by mouth every evening.  -     HYDROcodone-acetaminophen (NORCO)  mg per tablet; Take 1 tablet by mouth every 8 (eight) hours as needed for Pain.  -     T4, Free; Future  -     TSH; Future  -     Vitamin D; Future  -     Cyclic Citrullinated Peptide Antibody, IgG; Future  -     Rheumatoid Factor; Future  -     Sedimentation rate; Future  -     C-Reactive Protein; Future  -     Comprehensive Metabolic Panel; Future  -     CBC Auto Differential; Future    Cervical spinal stenosis  -     hydroxychloroquine (PLAQUENIL) 200 mg tablet; Take 1 tablet (200 mg total) by mouth once daily.  -     paroxetine (PAXIL) 10 MG tablet; Take 1 tablet (10 mg total) by mouth every evening.  -     pregabalin (LYRICA) 25 MG capsule; Take 1 capsule (25 mg total) by mouth 3 (three) times daily.  -     traZODone (DESYREL) 100 MG tablet; Take 1 tablet (100 mg total) by mouth every evening.  -     HYDROcodone-acetaminophen (NORCO)  mg per tablet; Take 1 tablet by mouth every 8 (eight) hours as needed for Pain.  -     methylPREDNISolone acetate injection 80 mg  -     ketorolac injection 60 mg  -     cyanocobalamin injection 1,000 mcg  -     T4, Free; Future  -     TSH; Future  -     Vitamin D; Future  -     Cyclic Citrullinated Peptide Antibody, IgG; Future  -      Rheumatoid Factor; Future  -     Sedimentation rate; Future  -     C-Reactive Protein; Future  -     Comprehensive Metabolic Panel; Future  -     CBC Auto Differential; Future    Anxiety  -     hydroxychloroquine (PLAQUENIL) 200 mg tablet; Take 1 tablet (200 mg total) by mouth once daily.  -     paroxetine (PAXIL) 10 MG tablet; Take 1 tablet (10 mg total) by mouth every evening.  -     pregabalin (LYRICA) 25 MG capsule; Take 1 capsule (25 mg total) by mouth 3 (three) times daily.  -     traZODone (DESYREL) 100 MG tablet; Take 1 tablet (100 mg total) by mouth every evening.  -     HYDROcodone-acetaminophen (NORCO)  mg per tablet; Take 1 tablet by mouth every 8 (eight) hours as needed for Pain.    Cervical arthritis with myelopathy  -     pregabalin (LYRICA) 25 MG capsule; Take 1 capsule (25 mg total) by mouth 3 (three) times daily.    Hashimoto's thyroiditis  -     hydroxychloroquine (PLAQUENIL) 200 mg tablet; Take 1 tablet (200 mg total) by mouth once daily.  -     paroxetine (PAXIL) 10 MG tablet; Take 1 tablet (10 mg total) by mouth every evening.  -     pregabalin (LYRICA) 25 MG capsule; Take 1 capsule (25 mg total) by mouth 3 (three) times daily.  -     traZODone (DESYREL) 100 MG tablet; Take 1 tablet (100 mg total) by mouth every evening.  -     HYDROcodone-acetaminophen (NORCO)  mg per tablet; Take 1 tablet by mouth every 8 (eight) hours as needed for Pain.  -     T4, Free; Future  -     TSH; Future  -     Vitamin D; Future  -     Cyclic Citrullinated Peptide Antibody, IgG; Future  -     Rheumatoid Factor; Future  -     Sedimentation rate; Future  -     C-Reactive Protein; Future  -     Comprehensive Metabolic Panel; Future  -     CBC Auto Differential; Future    Spinal stenosis of lumbar region with neurogenic claudication  -     hydroxychloroquine (PLAQUENIL) 200 mg tablet; Take 1 tablet (200 mg total) by mouth once daily.  -     paroxetine (PAXIL) 10 MG tablet; Take 1 tablet (10 mg total) by  mouth every evening.  -     pregabalin (LYRICA) 25 MG capsule; Take 1 capsule (25 mg total) by mouth 3 (three) times daily.  -     traZODone (DESYREL) 100 MG tablet; Take 1 tablet (100 mg total) by mouth every evening.  -     HYDROcodone-acetaminophen (NORCO)  mg per tablet; Take 1 tablet by mouth every 8 (eight) hours as needed for Pain.  -     T4, Free; Future  -     TSH; Future  -     Vitamin D; Future  -     Cyclic Citrullinated Peptide Antibody, IgG; Future  -     Rheumatoid Factor; Future  -     Sedimentation rate; Future  -     C-Reactive Protein; Future  -     Comprehensive Metabolic Panel; Future  -     CBC Auto Differential; Future    MCTD (mixed connective tissue disease)  -     hydroxychloroquine (PLAQUENIL) 200 mg tablet; Take 1 tablet (200 mg total) by mouth once daily.  -     paroxetine (PAXIL) 10 MG tablet; Take 1 tablet (10 mg total) by mouth every evening.  -     pregabalin (LYRICA) 25 MG capsule; Take 1 capsule (25 mg total) by mouth 3 (three) times daily.  -     traZODone (DESYREL) 100 MG tablet; Take 1 tablet (100 mg total) by mouth every evening.  -     HYDROcodone-acetaminophen (NORCO)  mg per tablet; Take 1 tablet by mouth every 8 (eight) hours as needed for Pain.  -     T4, Free; Future  -     TSH; Future  -     Vitamin D; Future  -     Cyclic Citrullinated Peptide Antibody, IgG; Future  -     Rheumatoid Factor; Future  -     Sedimentation rate; Future  -     C-Reactive Protein; Future  -     Comprehensive Metabolic Panel; Future  -     CBC Auto Differential; Future    Thrush of mouth and esophagus  -     pregabalin (LYRICA) 25 MG capsule; Take 1 capsule (25 mg total) by mouth 3 (three) times daily.    BRENNON positive  -     pregabalin (LYRICA) 25 MG capsule; Take 1 capsule (25 mg total) by mouth 3 (three) times daily.    Acute ITP  -     pregabalin (LYRICA) 25 MG capsule; Take 1 capsule (25 mg total) by mouth 3 (three) times daily.    Type 2 diabetes mellitus with other specified  complication, unspecified whether long term insulin use  -     metFORMIN (GLUCOPHAGE-XR) 500 MG ER 24hr tablet; Take 1 tablet (500 mg total) by mouth daily with breakfast.    Vitamin D deficiency, unspecified  -     Vitamin D; Future      More than 50% of the  60 minute encounter was spent face to face counseling the patient regarding current status and future plan of care as well as side effects  of the medications. All questions were answered to patient's satisfaction also includes  non-face to face time preparing to see the patient (eg, review of tests), Obtaining and/or reviewing separately obtained history, Documenting clinical information in the electronic or other health record, Independently interpreting results

## 2024-04-17 ENCOUNTER — TELEPHONE (OUTPATIENT)
Dept: RHEUMATOLOGY | Facility: CLINIC | Age: 64
End: 2024-04-17
Payer: MEDICARE

## 2024-04-17 NOTE — TELEPHONE ENCOUNTER
----- Message from Jose Alfredo Jung sent at 4/17/2024 10:10 AM CDT -----  Regarding: advice  Contact: patient  Type: Needs Medical Advice  Who Called:  Patient   Symptoms (please be specific):    How long has patient had these symptoms:    Pharmacy name and phone #:    [facility] - [place] [address] - [id number]  [id number]  [place] [address]  Phone: [phone] Fax: [phone]    Best Call Back Number: [phone]  Additional Information: Pt stated that she was seen last Friday and her visit wasn't complete and she would like her after visit summary and meds refilled. Please call to advice. Thanks

## 2024-04-19 DIAGNOSIS — F41.9 ANXIETY: ICD-10-CM

## 2024-04-19 DIAGNOSIS — M48.02 CERVICAL SPINAL STENOSIS: ICD-10-CM

## 2024-04-19 DIAGNOSIS — M48.062 SPINAL STENOSIS OF LUMBAR REGION WITH NEUROGENIC CLAUDICATION: ICD-10-CM

## 2024-04-19 DIAGNOSIS — E11.69 TYPE 2 DIABETES MELLITUS WITH OTHER SPECIFIED COMPLICATION, UNSPECIFIED WHETHER LONG TERM INSULIN USE: ICD-10-CM

## 2024-04-19 DIAGNOSIS — M35.00 SJOGREN'S SYNDROME, WITH UNSPECIFIED ORGAN INVOLVEMENT: ICD-10-CM

## 2024-04-19 DIAGNOSIS — D72.819 LEUKOPENIA, UNSPECIFIED TYPE: ICD-10-CM

## 2024-04-19 DIAGNOSIS — G89.4 CHRONIC PAIN SYNDROME: ICD-10-CM

## 2024-04-19 DIAGNOSIS — M35.1 MCTD (MIXED CONNECTIVE TISSUE DISEASE): ICD-10-CM

## 2024-04-19 DIAGNOSIS — E06.3 HASHIMOTO'S THYROIDITIS: ICD-10-CM

## 2024-04-19 DIAGNOSIS — M45.9 ANKYLOSING SPONDYLITIS, UNSPECIFIED SITE OF SPINE: ICD-10-CM

## 2024-04-19 RX ORDER — METFORMIN HYDROCHLORIDE 500 MG/1
500 TABLET, EXTENDED RELEASE ORAL
Qty: 90 TABLET | Refills: 3 | Status: CANCELLED | OUTPATIENT
Start: 2024-04-19 | End: 2025-04-19

## 2024-04-19 RX ORDER — HYDROCODONE BITARTRATE AND ACETAMINOPHEN 10; 325 MG/1; MG/1
1 TABLET ORAL EVERY 8 HOURS PRN
Qty: 90 TABLET | Refills: 0 | Status: CANCELLED | OUTPATIENT
Start: 2024-04-19 | End: 2024-05-19

## 2024-04-21 DIAGNOSIS — K21.9 GERD WITHOUT ESOPHAGITIS: ICD-10-CM

## 2024-04-22 ENCOUNTER — TELEPHONE (OUTPATIENT)
Dept: RHEUMATOLOGY | Facility: CLINIC | Age: 64
End: 2024-04-22
Payer: MEDICARE

## 2024-04-22 RX ORDER — DICYCLOMINE HYDROCHLORIDE 10 MG/1
20 CAPSULE ORAL 4 TIMES DAILY
Qty: 240 CAPSULE | Refills: 3 | OUTPATIENT
Start: 2024-04-22

## 2024-04-22 NOTE — TELEPHONE ENCOUNTER
----- Message from Isrrael Lozano sent at 4/22/2024  3:11 PM CDT -----  Contact: self  Type:  RX Refill Request    Who Called:  Patient  Refill or New Rx:  Refill  RX Name and Strength:  HYDROcodone-acetaminophen (NORCO)  mg per tablet  metFORMIN (GLUCOPHAGE-XR) 500 MG ER 24hr tablet    How is the patient currently taking it? (ex. 1XDay):  as directed  Is this a 30 day or 90 day RX:  as directed    Preferred Pharmacy with phone number:    [place], [place] - [address] [address]  [address]  [facility] [address]  Phone: [phone] Fax: [phone]    Local or Mail Order:  Local  Ordering Provider:  Conner Pickering Call Back Number:  [phone]    Additional Information:  Pt need a refill is out of medication .Please advise

## 2024-04-22 NOTE — TELEPHONE ENCOUNTER
Returned patient call and informed both medications were sent to the pharmacy on 4-12. Patient stated will call the pharmacy

## 2024-04-24 ENCOUNTER — PATIENT MESSAGE (OUTPATIENT)
Dept: OBSTETRICS AND GYNECOLOGY | Facility: CLINIC | Age: 64
End: 2024-04-24
Payer: MEDICARE

## 2024-05-01 DIAGNOSIS — M45.9 ANKYLOSING SPONDYLITIS, UNSPECIFIED SITE OF SPINE: ICD-10-CM

## 2024-05-01 NOTE — TELEPHONE ENCOUNTER
----- Message from Maritza Alvarado PharmD sent at 5/1/2024 10:46 AM CDT -----  Malu Holland is continuing on Cosentyx therapy.    Due to recent contract restrictions imposed by [employer], we are no longer able to ship medications directly to your patient. We understand the inconvenience this may cause, especially for patients who rely on home delivery for their prescriptions.    To ensure that your patient continues to receive their necessary medications without interruption, we kindly request that you issue a new prescription to our pharmacy ([facility]). For convenience, we will send you a refill request for the new prescription soon. Once approved, our team will forward the prescription to an in-network pharmacy that can ship to your patient.     Please rest assured that our team remains committed to supporting your patient during this transition. We will continue to handle any required prior authorizations and will assist with financial aid applications to help manage medication costs effectively.

## 2024-05-02 RX ORDER — SECUKINUMAB 150 MG/ML
150 INJECTION SUBCUTANEOUS
Qty: 1 ML | Refills: 11 | Status: ACTIVE | OUTPATIENT
Start: 2024-05-02

## 2024-05-27 DIAGNOSIS — K21.9 GERD WITHOUT ESOPHAGITIS: ICD-10-CM

## 2024-05-27 NOTE — TELEPHONE ENCOUNTER
Pharmacy requesting refill on Pantoprazole 40mg  Pt's LOV 04/12/2024  Pt's NOV none scheduled  Medication pending

## 2024-05-31 RX ORDER — PANTOPRAZOLE SODIUM 40 MG/1
40 TABLET, DELAYED RELEASE ORAL DAILY
Qty: 30 TABLET | Refills: 5 | Status: SHIPPED | OUTPATIENT
Start: 2024-05-31

## 2024-08-08 ENCOUNTER — LAB VISIT (OUTPATIENT)
Dept: LAB | Facility: HOSPITAL | Age: 64
End: 2024-08-08
Payer: MEDICARE

## 2024-08-08 DIAGNOSIS — G89.4 CHRONIC PAIN SYNDROME: ICD-10-CM

## 2024-08-08 DIAGNOSIS — M35.00 SJOGREN'S SYNDROME, WITH UNSPECIFIED ORGAN INVOLVEMENT: ICD-10-CM

## 2024-08-08 DIAGNOSIS — M35.1 MCTD (MIXED CONNECTIVE TISSUE DISEASE): ICD-10-CM

## 2024-08-08 DIAGNOSIS — M45.9 ANKYLOSING SPONDYLITIS, UNSPECIFIED SITE OF SPINE: ICD-10-CM

## 2024-08-08 DIAGNOSIS — D72.819 LEUKOPENIA, UNSPECIFIED TYPE: ICD-10-CM

## 2024-08-08 DIAGNOSIS — M48.062 SPINAL STENOSIS OF LUMBAR REGION WITH NEUROGENIC CLAUDICATION: ICD-10-CM

## 2024-08-08 DIAGNOSIS — E55.9 VITAMIN D DEFICIENCY, UNSPECIFIED: ICD-10-CM

## 2024-08-08 DIAGNOSIS — M48.02 CERVICAL SPINAL STENOSIS: ICD-10-CM

## 2024-08-08 DIAGNOSIS — E06.3 HASHIMOTO'S THYROIDITIS: ICD-10-CM

## 2024-08-08 LAB
25(OH)D3+25(OH)D2 SERPL-MCNC: 72 NG/ML (ref 30–96)
ALBUMIN SERPL BCP-MCNC: 4 G/DL (ref 3.5–5.2)
ALP SERPL-CCNC: 53 U/L (ref 55–135)
ALT SERPL W/O P-5'-P-CCNC: 106 U/L (ref 10–44)
ANION GAP SERPL CALC-SCNC: 10 MMOL/L (ref 8–16)
AST SERPL-CCNC: 66 U/L (ref 10–40)
BILIRUB SERPL-MCNC: 0.4 MG/DL (ref 0.1–1)
BUN SERPL-MCNC: 19 MG/DL (ref 8–23)
CALCIUM SERPL-MCNC: 9.9 MG/DL (ref 8.7–10.5)
CHLORIDE SERPL-SCNC: 98 MMOL/L (ref 95–110)
CO2 SERPL-SCNC: 29 MMOL/L (ref 23–29)
CREAT SERPL-MCNC: 0.7 MG/DL (ref 0.5–1.4)
CRP SERPL-MCNC: 0.4 MG/L (ref 0–8.2)
ERYTHROCYTE [SEDIMENTATION RATE] IN BLOOD BY WESTERGREN METHOD: 22 MM/HR (ref 0–20)
EST. GFR  (NO RACE VARIABLE): >60 ML/MIN/1.73 M^2
GLUCOSE SERPL-MCNC: 106 MG/DL (ref 70–110)
POTASSIUM SERPL-SCNC: 4.5 MMOL/L (ref 3.5–5.1)
PROT SERPL-MCNC: 7.4 G/DL (ref 6–8.4)
RHEUMATOID FACT SERPL-ACNC: <13 IU/ML (ref 0–15)
SODIUM SERPL-SCNC: 137 MMOL/L (ref 136–145)
T4 FREE SERPL-MCNC: 0.97 NG/DL (ref 0.71–1.51)
TSH SERPL DL<=0.005 MIU/L-ACNC: 0.51 UIU/ML (ref 0.4–4)

## 2024-08-08 PROCEDURE — 86431 RHEUMATOID FACTOR QUANT: CPT | Performed by: INTERNAL MEDICINE

## 2024-08-08 PROCEDURE — 80053 COMPREHEN METABOLIC PANEL: CPT | Performed by: INTERNAL MEDICINE

## 2024-08-08 PROCEDURE — 85651 RBC SED RATE NONAUTOMATED: CPT | Mod: PO | Performed by: INTERNAL MEDICINE

## 2024-08-08 PROCEDURE — 36415 COLL VENOUS BLD VENIPUNCTURE: CPT | Mod: PO | Performed by: INTERNAL MEDICINE

## 2024-08-08 PROCEDURE — 86200 CCP ANTIBODY: CPT | Performed by: INTERNAL MEDICINE

## 2024-08-08 PROCEDURE — 84439 ASSAY OF FREE THYROXINE: CPT | Performed by: INTERNAL MEDICINE

## 2024-08-08 PROCEDURE — 86140 C-REACTIVE PROTEIN: CPT | Performed by: INTERNAL MEDICINE

## 2024-08-08 PROCEDURE — 84443 ASSAY THYROID STIM HORMONE: CPT | Performed by: INTERNAL MEDICINE

## 2024-08-08 PROCEDURE — 82306 VITAMIN D 25 HYDROXY: CPT | Performed by: INTERNAL MEDICINE

## 2024-08-08 PROCEDURE — 85025 COMPLETE CBC W/AUTO DIFF WBC: CPT | Performed by: INTERNAL MEDICINE

## 2024-08-09 ENCOUNTER — OFFICE VISIT (OUTPATIENT)
Dept: RHEUMATOLOGY | Facility: CLINIC | Age: 64
End: 2024-08-09
Payer: MEDICARE

## 2024-08-09 VITALS
BODY MASS INDEX: 21.6 KG/M2 | HEIGHT: 60 IN | WEIGHT: 110 LBS | DIASTOLIC BLOOD PRESSURE: 82 MMHG | SYSTOLIC BLOOD PRESSURE: 150 MMHG | HEART RATE: 97 BPM

## 2024-08-09 DIAGNOSIS — R21 RASH: ICD-10-CM

## 2024-08-09 DIAGNOSIS — D84.821 IMMUNOCOMPROMISED STATE DUE TO DRUG THERAPY: ICD-10-CM

## 2024-08-09 DIAGNOSIS — M45.9 ANKYLOSING SPONDYLITIS, UNSPECIFIED SITE OF SPINE: Primary | ICD-10-CM

## 2024-08-09 DIAGNOSIS — K31.84 GASTROPARESIS: ICD-10-CM

## 2024-08-09 DIAGNOSIS — M54.50 CHRONIC BILATERAL LOW BACK PAIN WITHOUT SCIATICA: ICD-10-CM

## 2024-08-09 DIAGNOSIS — Z79.899 IMMUNOCOMPROMISED STATE DUE TO DRUG THERAPY: ICD-10-CM

## 2024-08-09 DIAGNOSIS — R74.8 ELEVATED LIVER ENZYMES: ICD-10-CM

## 2024-08-09 DIAGNOSIS — G89.29 CHRONIC BILATERAL LOW BACK PAIN WITHOUT SCIATICA: ICD-10-CM

## 2024-08-09 DIAGNOSIS — R11.0 NAUSEA: ICD-10-CM

## 2024-08-09 DIAGNOSIS — M47.812 CERVICAL SPONDYLOSIS: ICD-10-CM

## 2024-08-09 LAB
BASOPHILS # BLD AUTO: 0.02 K/UL (ref 0–0.2)
BASOPHILS NFR BLD: 0.4 % (ref 0–1.9)
CCP AB SER IA-ACNC: 1.5 U/ML
DIFFERENTIAL METHOD BLD: ABNORMAL
EOSINOPHIL # BLD AUTO: 0.1 K/UL (ref 0–0.5)
EOSINOPHIL NFR BLD: 1.9 % (ref 0–8)
ERYTHROCYTE [DISTWIDTH] IN BLOOD BY AUTOMATED COUNT: 14.8 % (ref 11.5–14.5)
HCT VFR BLD AUTO: 42 % (ref 37–48.5)
HGB BLD-MCNC: 13.7 G/DL (ref 12–16)
IMM GRANULOCYTES # BLD AUTO: 0.01 K/UL (ref 0–0.04)
IMM GRANULOCYTES NFR BLD AUTO: 0.2 % (ref 0–0.5)
LYMPHOCYTES # BLD AUTO: 1.6 K/UL (ref 1–4.8)
LYMPHOCYTES NFR BLD: 32.9 % (ref 18–48)
MCH RBC QN AUTO: 30.3 PG (ref 27–31)
MCHC RBC AUTO-ENTMCNC: 32.6 G/DL (ref 32–36)
MCV RBC AUTO: 93 FL (ref 82–98)
MONOCYTES # BLD AUTO: 0.5 K/UL (ref 0.3–1)
MONOCYTES NFR BLD: 9.5 % (ref 4–15)
NEUTROPHILS # BLD AUTO: 2.7 K/UL (ref 1.8–7.7)
NEUTROPHILS NFR BLD: 55.1 % (ref 38–73)
NRBC BLD-RTO: 0 /100 WBC
PLATELET # BLD AUTO: 145 K/UL (ref 150–450)
PMV BLD AUTO: 11.5 FL (ref 9.2–12.9)
RBC # BLD AUTO: 4.52 M/UL (ref 4–5.4)
WBC # BLD AUTO: 4.84 K/UL (ref 3.9–12.7)

## 2024-08-09 PROCEDURE — 3008F BODY MASS INDEX DOCD: CPT | Mod: CPTII,S$GLB,, | Performed by: PHYSICIAN ASSISTANT

## 2024-08-09 PROCEDURE — 99215 OFFICE O/P EST HI 40 MIN: CPT | Mod: 25,S$GLB,, | Performed by: PHYSICIAN ASSISTANT

## 2024-08-09 PROCEDURE — 3044F HG A1C LEVEL LT 7.0%: CPT | Mod: CPTII,S$GLB,, | Performed by: PHYSICIAN ASSISTANT

## 2024-08-09 PROCEDURE — 1160F RVW MEDS BY RX/DR IN RCRD: CPT | Mod: CPTII,S$GLB,, | Performed by: PHYSICIAN ASSISTANT

## 2024-08-09 PROCEDURE — 3288F FALL RISK ASSESSMENT DOCD: CPT | Mod: CPTII,S$GLB,, | Performed by: PHYSICIAN ASSISTANT

## 2024-08-09 PROCEDURE — 4010F ACE/ARB THERAPY RXD/TAKEN: CPT | Mod: CPTII,S$GLB,, | Performed by: PHYSICIAN ASSISTANT

## 2024-08-09 PROCEDURE — 99999 PR PBB SHADOW E&M-EST. PATIENT-LVL V: CPT | Mod: PBBFAC,,, | Performed by: PHYSICIAN ASSISTANT

## 2024-08-09 PROCEDURE — 3077F SYST BP >= 140 MM HG: CPT | Mod: CPTII,S$GLB,, | Performed by: PHYSICIAN ASSISTANT

## 2024-08-09 PROCEDURE — 1159F MED LIST DOCD IN RCRD: CPT | Mod: CPTII,S$GLB,, | Performed by: PHYSICIAN ASSISTANT

## 2024-08-09 PROCEDURE — 3079F DIAST BP 80-89 MM HG: CPT | Mod: CPTII,S$GLB,, | Performed by: PHYSICIAN ASSISTANT

## 2024-08-09 PROCEDURE — 1101F PT FALLS ASSESS-DOCD LE1/YR: CPT | Mod: CPTII,S$GLB,, | Performed by: PHYSICIAN ASSISTANT

## 2024-08-09 PROCEDURE — 96372 THER/PROPH/DIAG INJ SC/IM: CPT | Mod: S$GLB,,, | Performed by: PHYSICIAN ASSISTANT

## 2024-08-09 RX ORDER — GLIPIZIDE 5 MG/1
5 TABLET ORAL
COMMUNITY
Start: 2024-04-13

## 2024-08-09 RX ORDER — METHYLPREDNISOLONE ACETATE 80 MG/ML
80 INJECTION, SUSPENSION INTRA-ARTICULAR; INTRALESIONAL; INTRAMUSCULAR; SOFT TISSUE
Status: COMPLETED | OUTPATIENT
Start: 2024-08-09 | End: 2024-08-09

## 2024-08-09 RX ORDER — ONDANSETRON 4 MG/1
4 TABLET, ORALLY DISINTEGRATING ORAL EVERY 8 HOURS PRN
Qty: 15 TABLET | Refills: 0 | Status: SHIPPED | OUTPATIENT
Start: 2024-08-09

## 2024-08-09 RX ORDER — TRIAMCINOLONE ACETONIDE 0.25 MG/G
CREAM TOPICAL 2 TIMES DAILY
Qty: 15 G | Refills: 0 | Status: SHIPPED | OUTPATIENT
Start: 2024-08-09 | End: 2024-08-16

## 2024-08-09 RX ORDER — KETOROLAC TROMETHAMINE 30 MG/ML
60 INJECTION, SOLUTION INTRAMUSCULAR; INTRAVENOUS
Status: COMPLETED | OUTPATIENT
Start: 2024-08-09 | End: 2024-08-09

## 2024-08-09 RX ORDER — CYANOCOBALAMIN 1000 UG/ML
1000 INJECTION, SOLUTION INTRAMUSCULAR; SUBCUTANEOUS
Status: COMPLETED | OUTPATIENT
Start: 2024-08-09 | End: 2024-08-09

## 2024-08-09 RX ADMIN — KETOROLAC TROMETHAMINE 60 MG: 30 INJECTION, SOLUTION INTRAMUSCULAR; INTRAVENOUS at 04:08

## 2024-08-09 RX ADMIN — CYANOCOBALAMIN 1000 MCG: 1000 INJECTION, SOLUTION INTRAMUSCULAR; SUBCUTANEOUS at 04:08

## 2024-08-09 RX ADMIN — METHYLPREDNISOLONE ACETATE 80 MG: 80 INJECTION, SUSPENSION INTRA-ARTICULAR; INTRALESIONAL; INTRAMUSCULAR; SOFT TISSUE at 04:08

## 2024-08-09 NOTE — PATIENT INSTRUCTIONS
Please call [phone] to schedule with Gastroenterology, Pain management, and Primary Care.     Primary Care physicians in [place]: Dr. Jasmin Ivy or Dr. Makenna Rodgers    Please get x-rays in [place] soon    Labs will be scheduled 1 week prior to your next visit    Continue cosentyx 150 mg monthly for now

## 2024-08-12 DIAGNOSIS — G89.4 CHRONIC PAIN SYNDROME: ICD-10-CM

## 2024-08-12 DIAGNOSIS — M45.9 ANKYLOSING SPONDYLITIS, UNSPECIFIED SITE OF SPINE: Primary | ICD-10-CM

## 2024-08-12 RX ORDER — PREDNISONE 1 MG/1
3 TABLET ORAL DAILY PRN
Qty: 90 TABLET | Refills: 1 | Status: SHIPPED | OUTPATIENT
Start: 2024-08-12

## 2024-08-12 RX ORDER — DICYCLOMINE HYDROCHLORIDE 10 MG/1
20 CAPSULE ORAL 4 TIMES DAILY PRN
Qty: 240 CAPSULE | Refills: 3 | Status: SHIPPED | OUTPATIENT
Start: 2024-08-12

## 2024-08-12 RX ORDER — CYCLOBENZAPRINE HCL 10 MG
10 TABLET ORAL 3 TIMES DAILY PRN
Qty: 90 TABLET | Refills: 5 | Status: SHIPPED | OUTPATIENT
Start: 2024-08-12

## 2024-08-12 ASSESSMENT — ROUTINE ASSESSMENT OF PATIENT INDEX DATA (RAPID3)
PSYCHOLOGICAL DISTRESS SCORE: 3.3
PAIN SCORE: 5
PATIENT GLOBAL ASSESSMENT SCORE: 7
FATIGUE SCORE: 3.3
TOTAL RAPID3 SCORE: 5.89
MDHAQ FUNCTION SCORE: 1.7

## 2024-08-12 NOTE — PROGRESS NOTES
Subjective:       Patient ID: Malu Holland is a 64 y.o. female.    Chief Complaint: Disease Management    Mrs. Holland is a 64 year old female who presents to clinic for follow up on ankylosing spondylitis and sjogren's syndrome. She is doing poorly due to increased pain in her neck and low back. Previous imaging studies 2021 showed multilevel cervical spondylosis and severe neural foraminal narrowing at C5-6 and mild L4-5 central spinal stenosis. She reports compliance with cosentyx monthly and she does feel this helps with inflammation.     She complains of abdominal fullness after eating certain foods. Labs recently w/PCP showed elevated LFTs and repeat testing AST/ALT higher at 66/106 respectively. Abdominal US ordered. She complains of nausea as well.     She is also undergoing evaluation for persistent vaginal bleeding--pelvic US ordered.     She has area of hyperpigmentation on her upper back that is pruritic.        Review of Systems   Constitutional:  Positive for activity change and fatigue. Negative for appetite change, chills and fever.   Eyes:  Negative for visual disturbance.   Respiratory:  Negative for cough and shortness of breath.    Cardiovascular:  Negative for leg swelling.   Gastrointestinal:  Positive for abdominal distention and nausea. Negative for abdominal pain, constipation, diarrhea and vomiting.   Genitourinary:  Positive for vaginal bleeding.   Musculoskeletal:  Positive for arthralgias, back pain, gait problem, joint swelling, myalgias, neck pain and neck stiffness.   Skin:  Positive for rash.   Allergic/Immunologic: Positive for immunocompromised state.   Neurological:  Positive for weakness and headaches. Negative for dizziness and light-headedness.   Psychiatric/Behavioral:  Positive for dysphoric mood.          Objective:     Vitals:    08/09/24 1502   BP: (!) 150/82   Pulse: 97       Past Medical History:   Diagnosis Date    Acid reflux     Diabetes mellitus     Dry mouth      Hypertension      Past Surgical History:   Procedure Laterality Date    ADENOIDECTOMY       SECTION      CHOLECYSTECTOMY      COLON SURGERY      HYSTERECTOMY      LASIK      TONSILLECTOMY            Physical Exam   Constitutional: She is oriented to person, place, and time.   Eyes: Right conjunctiva is not injected. Left conjunctiva is not injected.   Neck: No JVD present. No thyromegaly present.   Cardiovascular: Normal rate.   Pulmonary/Chest: Effort normal.   Musculoskeletal:      Right shoulder: Normal.      Left shoulder: Normal.      Right elbow: Normal.      Left elbow: Normal.      Right wrist: Normal.      Left wrist: Normal.      Right knee: Normal.      Left knee: Normal.   Lymphadenopathy:     She has no cervical adenopathy.   Neurological: She is alert and oriented to person, place, and time. Gait normal.   Skin: No rash noted.   Psychiatric: Mood and affect normal.       Right Side Rheumatological Exam     Examination finds the shoulder, elbow, wrist, knee, 1st MCP, 2nd MCP, 3rd MCP, 4th MCP and 5th MCP normal.    The patient is tender to palpation of the 1st PIP, 2nd PIP, 3rd PIP, 4th PIP and 5th PIP    The patient has an enlarged 1st PIP, 2nd PIP, 3rd PIP, 4th PIP and 5th PIP    Left Side Rheumatological Exam     Examination finds the shoulder, elbow, wrist, knee, 1st MCP, 2nd MCP, 3rd MCP, 4th MCP and 5th MCP normal.    The patient is tender to palpation of the 1st PIP, 2nd PIP, 3rd PIP, 4th PIP and 5th PIP.    The patient has an enlarged 1st PIP, 2nd PIP, 3rd PIP, 4th PIP and 5th PIP.      Back/Neck Exam   Tenderness Right paramedian tenderness of the Lower C-Spine, Upper T-Spine, Lower L-Spine, Upper L-Spine and Lower T-Spine.Left paramedian tenderness of the Lower C-Spine, Upper T-Spine, Lower T-Spine, Upper L-Spine and Lower L-Spine.          Assessment:       1. Ankylosing spondylitis, unspecified site of spine    2. Elevated liver enzymes    3. Gastroparesis    4. Cervical spondylosis     5. Chronic bilateral low back pain without sciatica    6. Rash    7. Immunocompromised state due to drug therapy    8. Nausea              Plan:       Ankylosing spondylitis, unspecified site of spine  -     ketorolac injection 60 mg  -     methylPREDNISolone acetate injection 80 mg  -     cyanocobalamin injection 1,000 mcg  -     X-Ray Cervical Spine AP And Lateral; Future; Expected date: 08/09/2024  -     X-Ray Lumbar Spine AP And Lateral; Future; Expected date: 08/09/2024  -     Ambulatory referral/consult to Internal Medicine; Future; Expected date: 08/16/2024  -     CBC Auto Differential; Future; Expected date: 08/09/2024  -     Comprehensive Metabolic Panel; Future; Expected date: 08/09/2024  -     C-Reactive Protein; Future; Expected date: 08/09/2024  -     Sedimentation rate; Future; Expected date: 08/09/2024  -     Quantiferon Gold TB; Future; Expected date: 08/09/2024  -     cyclobenzaprine (FLEXERIL) 10 MG tablet; Take 1 tablet (10 mg total) by mouth 3 (three) times daily as needed for Muscle spasms.  Dispense: 90 tablet; Refill: 5  -     folic acid-vit B6-vit B12 (FOLBEE) 2.5-25-1 mg Tab; Take 1 tablet by mouth once daily.  Dispense: 90 tablet; Refill: 2  -     predniSONE (DELTASONE) 1 MG tablet; Take 3 tablets (3 mg total) by mouth daily as needed (arthritis flare).  Dispense: 90 tablet; Refill: 1    Elevated liver enzymes  -     Ambulatory referral/consult to Gastroenterology; Future; Expected date: 08/16/2024  -     Ambulatory referral/consult to Internal Medicine; Future; Expected date: 08/16/2024    Gastroparesis  -     Ambulatory referral/consult to Gastroenterology; Future; Expected date: 08/16/2024  -     Ambulatory referral/consult to Internal Medicine; Future; Expected date: 08/16/2024  -     dicyclomine (BENTYL) 10 MG capsule; Take 2 capsules (20 mg total) by mouth 4 (four) times daily as needed (gi spasm).  Dispense: 240 capsule; Refill: 3    Cervical spondylosis  -     Ambulatory  referral/consult to Pain Clinic; Future; Expected date: 08/16/2024  -     Ambulatory referral/consult to Internal Medicine; Future; Expected date: 08/16/2024    Chronic bilateral low back pain without sciatica  -     Ambulatory referral/consult to Pain Clinic; Future; Expected date: 08/16/2024  -     Ambulatory referral/consult to Internal Medicine; Future; Expected date: 08/16/2024    Rash  -     triamcinolone acetonide 0.025% (KENALOG) 0.025 % cream; Apply topically 2 (two) times daily. for 7 days  Dispense: 15 g; Refill: 0    Immunocompromised state due to drug therapy  -     Quantiferon Gold TB; Future; Expected date: 08/09/2024    Nausea  -     ondansetron (ZOFRAN-ODT) 4 MG TbDL; Take 1 tablet (4 mg total) by mouth every 8 (eight) hours as needed (nausea).  Dispense: 15 tablet; Refill: 0        Ankylosing spondylitis, RA, PSA, Sjogren's  --leukopenia, thrombocytopenia  --controlled type 2 diabetes  --chronic pain syndrome  --depression on paxil    Plan:  Toradol 60, depo 80, b12 for acute pain  X-ray cervical spine, L spine. Pain management referral  Cont cosentyx 150 mg monthly. May consider increasing 300 mg in the future  Cont prednisone prn, continue plaquenil 200 mg daily  Cont lyrica  Cont norco per MD.  I have checked [place] prescription monitoring program site and no unusual or abnormal behavior has occurred pt understand the risk and benefits of taking opioid medications and has decided to continue the medication. Pended x 1  7.   Referral to primary care  8.   Referral to GI for elevated LFTs, nausea, abd bloating    Follow up:  4 months Dr. Prieto

## 2024-08-13 DIAGNOSIS — M45.9 ANKYLOSING SPONDYLITIS, UNSPECIFIED SITE OF SPINE: ICD-10-CM

## 2024-08-13 DIAGNOSIS — M48.02 CERVICAL SPINAL STENOSIS: ICD-10-CM

## 2024-08-13 DIAGNOSIS — F41.9 ANXIETY: ICD-10-CM

## 2024-08-13 DIAGNOSIS — M48.062 SPINAL STENOSIS OF LUMBAR REGION WITH NEUROGENIC CLAUDICATION: ICD-10-CM

## 2024-08-13 DIAGNOSIS — G89.4 CHRONIC PAIN SYNDROME: ICD-10-CM

## 2024-08-13 DIAGNOSIS — D72.819 LEUKOPENIA, UNSPECIFIED TYPE: ICD-10-CM

## 2024-08-13 DIAGNOSIS — M35.1 MCTD (MIXED CONNECTIVE TISSUE DISEASE): ICD-10-CM

## 2024-08-13 DIAGNOSIS — E06.3 HASHIMOTO'S THYROIDITIS: ICD-10-CM

## 2024-08-13 DIAGNOSIS — M35.00 SJOGREN'S SYNDROME, WITH UNSPECIFIED ORGAN INVOLVEMENT: ICD-10-CM

## 2024-08-13 RX ORDER — HYDROCODONE BITARTRATE AND ACETAMINOPHEN 10; 325 MG/1; MG/1
1 TABLET ORAL EVERY 8 HOURS PRN
Qty: 90 TABLET | Refills: 0 | Status: SHIPPED | OUTPATIENT
Start: 2024-08-13

## 2024-08-13 RX ORDER — HYDROCODONE BITARTRATE AND ACETAMINOPHEN 10; 325 MG/1; MG/1
1 TABLET ORAL EVERY 8 HOURS PRN
Qty: 90 TABLET | Refills: 0 | OUTPATIENT
Start: 2024-08-13

## 2024-08-20 ENCOUNTER — HOSPITAL ENCOUNTER (OUTPATIENT)
Dept: RADIOLOGY | Facility: HOSPITAL | Age: 64
Discharge: HOME | End: 2024-08-20
Payer: MEDICARE

## 2024-08-20 DIAGNOSIS — M45.9 ANKYLOSING SPONDYLITIS, UNSPECIFIED SITE OF SPINE: ICD-10-CM

## 2024-08-20 PROCEDURE — 72100 X-RAY EXAM L-S SPINE 2/3 VWS: CPT | Mod: 26,,, | Performed by: RADIOLOGY

## 2024-08-20 PROCEDURE — 72040 X-RAY EXAM NECK SPINE 2-3 VW: CPT | Mod: TC,PO

## 2024-08-20 PROCEDURE — 72100 X-RAY EXAM L-S SPINE 2/3 VWS: CPT | Mod: TC,PO

## 2024-08-20 PROCEDURE — 72040 X-RAY EXAM NECK SPINE 2-3 VW: CPT | Mod: 26,,, | Performed by: RADIOLOGY

## 2024-09-16 DIAGNOSIS — G89.4 CHRONIC PAIN SYNDROME: ICD-10-CM

## 2024-09-16 DIAGNOSIS — D72.819 LEUKOPENIA, UNSPECIFIED TYPE: ICD-10-CM

## 2024-09-16 DIAGNOSIS — E06.3 HASHIMOTO'S THYROIDITIS: ICD-10-CM

## 2024-09-16 DIAGNOSIS — M35.00 SJOGREN'S SYNDROME, WITH UNSPECIFIED ORGAN INVOLVEMENT: ICD-10-CM

## 2024-09-16 DIAGNOSIS — M47.12 CERVICAL ARTHRITIS WITH MYELOPATHY: ICD-10-CM

## 2024-09-16 DIAGNOSIS — B37.0 THRUSH OF MOUTH AND ESOPHAGUS: ICD-10-CM

## 2024-09-16 DIAGNOSIS — M48.02 CERVICAL SPINAL STENOSIS: ICD-10-CM

## 2024-09-16 DIAGNOSIS — B37.81 THRUSH OF MOUTH AND ESOPHAGUS: ICD-10-CM

## 2024-09-16 DIAGNOSIS — D69.3 ACUTE ITP: ICD-10-CM

## 2024-09-16 DIAGNOSIS — F41.9 ANXIETY: ICD-10-CM

## 2024-09-16 DIAGNOSIS — M35.1 MCTD (MIXED CONNECTIVE TISSUE DISEASE): ICD-10-CM

## 2024-09-16 DIAGNOSIS — M48.062 SPINAL STENOSIS OF LUMBAR REGION WITH NEUROGENIC CLAUDICATION: ICD-10-CM

## 2024-09-16 DIAGNOSIS — R76.8 ANA POSITIVE: ICD-10-CM

## 2024-09-16 DIAGNOSIS — M45.9 ANKYLOSING SPONDYLITIS, UNSPECIFIED SITE OF SPINE: ICD-10-CM

## 2024-09-16 DIAGNOSIS — K21.9 GERD WITHOUT ESOPHAGITIS: ICD-10-CM

## 2024-09-16 RX ORDER — PREGABALIN 25 MG/1
25 CAPSULE ORAL 3 TIMES DAILY
Qty: 90 CAPSULE | Refills: 3 | Status: SHIPPED | OUTPATIENT
Start: 2024-09-16

## 2024-10-07 DIAGNOSIS — M45.9 ANKYLOSING SPONDYLITIS, UNSPECIFIED SITE OF SPINE: ICD-10-CM

## 2024-10-07 RX ORDER — SECUKINUMAB 150 MG/ML
150 INJECTION SUBCUTANEOUS
Qty: 1 ML | Refills: 11 | Status: ACTIVE | OUTPATIENT
Start: 2024-10-07

## 2024-10-16 ENCOUNTER — OFFICE VISIT (OUTPATIENT)
Dept: PAIN MEDICINE | Facility: CLINIC | Age: 64
End: 2024-10-16
Payer: MEDICARE

## 2024-10-16 ENCOUNTER — TELEPHONE (OUTPATIENT)
Dept: PAIN MEDICINE | Facility: CLINIC | Age: 64
End: 2024-10-16
Payer: MEDICARE

## 2024-10-16 VITALS — WEIGHT: 115 LBS | BODY MASS INDEX: 22.58 KG/M2 | HEIGHT: 60 IN

## 2024-10-16 DIAGNOSIS — M54.2 CERVICALGIA: ICD-10-CM

## 2024-10-16 DIAGNOSIS — M54.50 CHRONIC BILATERAL LOW BACK PAIN WITHOUT SCIATICA: ICD-10-CM

## 2024-10-16 DIAGNOSIS — M47.812 CERVICAL SPONDYLOSIS: ICD-10-CM

## 2024-10-16 DIAGNOSIS — M47.817 SPONDYLOSIS OF LUMBOSACRAL REGION WITHOUT MYELOPATHY OR RADICULOPATHY: ICD-10-CM

## 2024-10-16 DIAGNOSIS — M54.9 DORSALGIA, UNSPECIFIED: Primary | ICD-10-CM

## 2024-10-16 DIAGNOSIS — G89.29 CHRONIC BILATERAL LOW BACK PAIN WITHOUT SCIATICA: ICD-10-CM

## 2024-10-16 PROCEDURE — 3288F FALL RISK ASSESSMENT DOCD: CPT | Mod: CPTII,S$GLB,, | Performed by: STUDENT IN AN ORGANIZED HEALTH CARE EDUCATION/TRAINING PROGRAM

## 2024-10-16 PROCEDURE — 1101F PT FALLS ASSESS-DOCD LE1/YR: CPT | Mod: CPTII,S$GLB,, | Performed by: STUDENT IN AN ORGANIZED HEALTH CARE EDUCATION/TRAINING PROGRAM

## 2024-10-16 PROCEDURE — 3008F BODY MASS INDEX DOCD: CPT | Mod: CPTII,S$GLB,, | Performed by: STUDENT IN AN ORGANIZED HEALTH CARE EDUCATION/TRAINING PROGRAM

## 2024-10-16 PROCEDURE — 1160F RVW MEDS BY RX/DR IN RCRD: CPT | Mod: CPTII,S$GLB,, | Performed by: STUDENT IN AN ORGANIZED HEALTH CARE EDUCATION/TRAINING PROGRAM

## 2024-10-16 PROCEDURE — 4010F ACE/ARB THERAPY RXD/TAKEN: CPT | Mod: CPTII,S$GLB,, | Performed by: STUDENT IN AN ORGANIZED HEALTH CARE EDUCATION/TRAINING PROGRAM

## 2024-10-16 PROCEDURE — 99999 PR PBB SHADOW E&M-EST. PATIENT-LVL V: CPT | Mod: PBBFAC,,, | Performed by: STUDENT IN AN ORGANIZED HEALTH CARE EDUCATION/TRAINING PROGRAM

## 2024-10-16 PROCEDURE — 1159F MED LIST DOCD IN RCRD: CPT | Mod: CPTII,S$GLB,, | Performed by: STUDENT IN AN ORGANIZED HEALTH CARE EDUCATION/TRAINING PROGRAM

## 2024-10-16 PROCEDURE — 99204 OFFICE O/P NEW MOD 45 MIN: CPT | Mod: S$GLB,,, | Performed by: STUDENT IN AN ORGANIZED HEALTH CARE EDUCATION/TRAINING PROGRAM

## 2024-10-16 PROCEDURE — 3044F HG A1C LEVEL LT 7.0%: CPT | Mod: CPTII,S$GLB,, | Performed by: STUDENT IN AN ORGANIZED HEALTH CARE EDUCATION/TRAINING PROGRAM

## 2024-10-16 RX ORDER — TRIAMCINOLONE ACETONIDE 0.25 MG/G
CREAM TOPICAL
COMMUNITY
Start: 2024-08-09

## 2024-10-16 RX ORDER — LISINOPRIL AND HYDROCHLOROTHIAZIDE 12.5; 2 MG/1; MG/1
1 TABLET ORAL DAILY
COMMUNITY
Start: 2024-10-01

## 2024-10-16 RX ORDER — PRAVASTATIN SODIUM 40 MG/1
1 TABLET ORAL DAILY
COMMUNITY
Start: 2024-07-30

## 2024-10-16 RX ORDER — AMOXICILLIN AND CLAVULANATE POTASSIUM 875; 125 MG/1; MG/1
TABLET, FILM COATED ORAL
COMMUNITY
Start: 2024-09-23

## 2024-10-16 NOTE — TELEPHONE ENCOUNTER
Order Date:10/16/2024   Ordering User:CARMELA CHANDLER [[id number]]   Encounter Provider:Carmela Chandler MD [[id number]]   Authorizing Provider: Carmela Chandler MD [[id number]]   Department:[facility] PAIN MANAGEMENT[[id number]]      Common Order Information   Procedure -> Medial Branch Block (Specify level and laterality) Cmt: B MBB L4-5             and L5-S1 (Cov)      Order Specific Information   Order: Procedure Order to Pain Management [Custom: [id number]]  Order #:          [id number]: 1 FUTURE     Priority: Routine  Class: Clinic Performed     Future Order Information       Expires on:10/16/2025            Expected by:10/16/2024                   Associated Diagnoses       M47.817 Spondylosis of lumbosacral region without myelopathy or       radiculopathy       Facility Name: -> [facility]          Follow-up: -> 2 weeks              Priority: Routine  Class: Clinic Performed     Future Order Information       Expires on:10/16/2025            Expected by:10/16/2024                   Associated Diagnoses       M47.817 Spondylosis of lumbosacral region without myelopathy or       radiculopathy       Procedure -> Medial Branch Block (Specify level and laterality) Cmt: B MBB                 L4-5 and L5-S1 (Cov)

## 2024-10-17 NOTE — PROGRESS NOTES
[place] - Department    Bassem Li MD      First Office Visit: 10/16/24  Today' Date: 10/16/2024  Last Office Visit: None    Chief complaint: back pain    HPI: Pt is a pleasant 64 y.o., who presents for evaluation. Referred by Kiki Wall PA-C. Pt complains of back pain and neck pain for yrs. States back pain is worse than the neck pain. Back pain is worse with standing or sitting too long. Endorses pain in both sides of the lower back. Denies having any sharp, shooting pain going down both legs. Neck pain starts in the neck and goes down to the shoulders. Endorses having numbness and tingling of bilateral fingers and toes. States pain mostly stays in neck. Endorses having problems dropping objects. Denies having balance issues or headaches. No BB changes. Has been doing HEP.         Pain disability index score: 40  Pain score: 6    Relevant Imaging/ Testing:   XR L-spine 8/24  XR C-spine 8/24    Procedures: None    Date of board of pharmacy review:10/16/2024  Date of opioid risk screening/ pain psych: None  Date of opioid agreement and consent: None  Date of urine drug screen: None  Date of random pill count: None     was reviewed today: reviewed, THC use    Prescribed medications: None    See EHR for  PMH, PSH, FH, SH, Medications and Allergy    ROS:  Positive for pain  ROS     PE:  There were no vitals filed for this visit.  General: Pleasant, no distress  HEENT: NC/ AT. PERRLA  CV: Radial pulses intact  Pulm: No distress  Ext: No edema    Physical Exam     Neuromusculoskeletal:  Head: NC, AT. PERRLA. No occipital tenderness  Neck: Intact range of motions, extension, flexion, rotation. Bilat Facet loading. Neg Spurling. Min Tenderness.  5/5 Strength, normal tone. Neg Horn's  Shoulder: Intact range of motion  Thoracic: Min tenderness, no step off  Lumbar: Intact range of motion. Bilat Facet loading. Min Tenderness. Neg SL. No pain with flexion. No pain with extension.   Hip: Intact range of  motion  SI: Level  Knee: Intact range of motion  Reflexes: normal biceps,  Knee  Strength: 5/5 globally   Sensory: Grossly intact   Skin: No bruising, erythema  Gait: Normal      Impression:  Back pain  Neck pain  Relevant History  BMI 22.46      Assessment:  Axial back pain  Axial neck pain    Plan:  Discussed options  Imaging/ relevant records viewed/ reviewed/ discussed  Imaging results viewed and reviewed (noted above)/ reviewed with patient   reviewed  Cont HEP  MR C-spine  MR L-spine  B MBB L4-5 and L5-S1  Re-eval after  Plan for repeat MBB and RFA if appropriate  Consider B MBB C5-6 and C6-7  Spine surgery referral if needed       Prescribed medications:  1. None    The impression and plan were discussed and explained in detail. All the questions were answered. Education was provided accordingly.     The procedure was explained in detail, along with risks and potential side effects.    Follow-up:  For procedure     Aisha Salas MD

## 2024-10-24 ENCOUNTER — OFFICE VISIT (OUTPATIENT)
Dept: FAMILY MEDICINE | Facility: CLINIC | Age: 64
End: 2024-10-24
Payer: MEDICARE

## 2024-10-24 VITALS
DIASTOLIC BLOOD PRESSURE: 82 MMHG | HEART RATE: 80 BPM | RESPIRATION RATE: 16 BRPM | WEIGHT: 110.69 LBS | OXYGEN SATURATION: 98 % | HEIGHT: 59 IN | TEMPERATURE: 97 F | BODY MASS INDEX: 22.32 KG/M2 | SYSTOLIC BLOOD PRESSURE: 132 MMHG

## 2024-10-24 DIAGNOSIS — K59.09 CHRONIC CONSTIPATION: ICD-10-CM

## 2024-10-24 DIAGNOSIS — F41.9 ANXIETY: ICD-10-CM

## 2024-10-24 DIAGNOSIS — Z23 NEED FOR VACCINATION FOR STREP PNEUMONIAE: ICD-10-CM

## 2024-10-24 DIAGNOSIS — G89.4 CHRONIC PAIN SYNDROME: ICD-10-CM

## 2024-10-24 DIAGNOSIS — D72.819 LEUKOPENIA, UNSPECIFIED TYPE: ICD-10-CM

## 2024-10-24 DIAGNOSIS — Z90.49 S/P COLECTOMY: ICD-10-CM

## 2024-10-24 DIAGNOSIS — K57.92 DIVERTICULITIS: ICD-10-CM

## 2024-10-24 DIAGNOSIS — D69.6 THROMBOCYTOPENIA: ICD-10-CM

## 2024-10-24 DIAGNOSIS — R92.8 ABNORMAL MAMMOGRAM: ICD-10-CM

## 2024-10-24 DIAGNOSIS — K31.84 GASTROPARESIS: ICD-10-CM

## 2024-10-24 DIAGNOSIS — M45.9 ANKYLOSING SPONDYLITIS, UNSPECIFIED SITE OF SPINE: ICD-10-CM

## 2024-10-24 DIAGNOSIS — I10 HYPERTENSION, UNSPECIFIED TYPE: ICD-10-CM

## 2024-10-24 DIAGNOSIS — N64.52 NIPPLE DISCHARGE: ICD-10-CM

## 2024-10-24 DIAGNOSIS — Z76.89 ENCOUNTER TO ESTABLISH CARE: ICD-10-CM

## 2024-10-24 DIAGNOSIS — Z78.0 POSTMENOPAUSAL: Primary | ICD-10-CM

## 2024-10-24 DIAGNOSIS — M35.1 MCTD (MIXED CONNECTIVE TISSUE DISEASE): ICD-10-CM

## 2024-10-24 DIAGNOSIS — E06.3 HASHIMOTO'S THYROIDITIS: ICD-10-CM

## 2024-10-24 PROCEDURE — 3044F HG A1C LEVEL LT 7.0%: CPT | Mod: CPTII,S$GLB,, | Performed by: STUDENT IN AN ORGANIZED HEALTH CARE EDUCATION/TRAINING PROGRAM

## 2024-10-24 PROCEDURE — 3008F BODY MASS INDEX DOCD: CPT | Mod: CPTII,S$GLB,, | Performed by: STUDENT IN AN ORGANIZED HEALTH CARE EDUCATION/TRAINING PROGRAM

## 2024-10-24 PROCEDURE — 3288F FALL RISK ASSESSMENT DOCD: CPT | Mod: CPTII,S$GLB,, | Performed by: STUDENT IN AN ORGANIZED HEALTH CARE EDUCATION/TRAINING PROGRAM

## 2024-10-24 PROCEDURE — 4010F ACE/ARB THERAPY RXD/TAKEN: CPT | Mod: CPTII,S$GLB,, | Performed by: STUDENT IN AN ORGANIZED HEALTH CARE EDUCATION/TRAINING PROGRAM

## 2024-10-24 PROCEDURE — 3075F SYST BP GE 130 - 139MM HG: CPT | Mod: CPTII,S$GLB,, | Performed by: STUDENT IN AN ORGANIZED HEALTH CARE EDUCATION/TRAINING PROGRAM

## 2024-10-24 PROCEDURE — 3079F DIAST BP 80-89 MM HG: CPT | Mod: CPTII,S$GLB,, | Performed by: STUDENT IN AN ORGANIZED HEALTH CARE EDUCATION/TRAINING PROGRAM

## 2024-10-24 PROCEDURE — G0009 ADMIN PNEUMOCOCCAL VACCINE: HCPCS | Mod: S$GLB,,, | Performed by: STUDENT IN AN ORGANIZED HEALTH CARE EDUCATION/TRAINING PROGRAM

## 2024-10-24 PROCEDURE — 90677 PCV20 VACCINE IM: CPT | Mod: S$GLB,,, | Performed by: STUDENT IN AN ORGANIZED HEALTH CARE EDUCATION/TRAINING PROGRAM

## 2024-10-24 PROCEDURE — 1100F PTFALLS ASSESS-DOCD GE2>/YR: CPT | Mod: CPTII,S$GLB,, | Performed by: STUDENT IN AN ORGANIZED HEALTH CARE EDUCATION/TRAINING PROGRAM

## 2024-10-24 PROCEDURE — 99205 OFFICE O/P NEW HI 60 MIN: CPT | Mod: 25,S$GLB,, | Performed by: STUDENT IN AN ORGANIZED HEALTH CARE EDUCATION/TRAINING PROGRAM

## 2024-10-24 PROCEDURE — 99999 PR PBB SHADOW E&M-EST. PATIENT-LVL V: CPT | Mod: PBBFAC,,, | Performed by: STUDENT IN AN ORGANIZED HEALTH CARE EDUCATION/TRAINING PROGRAM

## 2024-10-24 PROCEDURE — 1159F MED LIST DOCD IN RCRD: CPT | Mod: CPTII,S$GLB,, | Performed by: STUDENT IN AN ORGANIZED HEALTH CARE EDUCATION/TRAINING PROGRAM

## 2024-10-24 RX ORDER — DOCUSATE SODIUM 100 MG/1
100 CAPSULE, LIQUID FILLED ORAL 2 TIMES DAILY
Qty: 180 CAPSULE | Refills: 3 | Status: SHIPPED | OUTPATIENT
Start: 2024-10-24

## 2024-10-24 RX ORDER — PAROXETINE HYDROCHLORIDE 20 MG/1
20 TABLET, FILM COATED ORAL NIGHTLY
Qty: 90 TABLET | Refills: 3 | Status: SHIPPED | OUTPATIENT
Start: 2024-10-24 | End: 2025-10-24

## 2024-10-25 ENCOUNTER — TELEPHONE (OUTPATIENT)
Dept: RHEUMATOLOGY | Facility: CLINIC | Age: 64
End: 2024-10-25
Payer: MEDICARE

## 2024-10-25 ENCOUNTER — HOSPITAL ENCOUNTER (OUTPATIENT)
Dept: RADIOLOGY | Facility: HOSPITAL | Age: 64
Discharge: HOME | End: 2024-10-25
Attending: STUDENT IN AN ORGANIZED HEALTH CARE EDUCATION/TRAINING PROGRAM
Payer: MEDICARE

## 2024-10-25 DIAGNOSIS — Z78.0 POSTMENOPAUSAL: ICD-10-CM

## 2024-10-25 PROCEDURE — 77080 DXA BONE DENSITY AXIAL: CPT | Mod: TC,PO

## 2024-10-25 PROCEDURE — 77080 DXA BONE DENSITY AXIAL: CPT | Mod: 26,,, | Performed by: STUDENT IN AN ORGANIZED HEALTH CARE EDUCATION/TRAINING PROGRAM

## 2024-10-25 NOTE — TELEPHONE ENCOUNTER
----- Message from Christine sent at 10/22/2024  4:36 PM CDT -----  Regarding: Patient Returning Call  Contact: patient at [phone]  Type:  Patient Returning Call    Who Called:  patient at [phone]    Who Left Message for Patient:  unsure  Does the patient know what this is regarding?:  unsure  Additional Information:  Please call and advise. Thank you

## 2024-10-25 NOTE — PROGRESS NOTES
Results have been reviewed via [email or web address]. Please verify that these have been viewed by patient. If not, please call patient with results.  I have sent a msg to patient with the following interpretation (see below):    You had an abnormal DEXA scan of your bones which is showing osteopenia. This is the step before osteoporosis.  Please start weight bearing exercises to help reduce the risk of a fracture.  Also, please start Oscal 500+D, take 1 tab daily.  We will recheck your DEXA scan in 2 years.   Below is a handout on osteoporosis which is what we don't want you to get, so preventing it with the above recommendations is important.    Dr. Rodgers      Osteoporosis in Women: Keeping Your Bones Healthy and Strong  What is osteoporosis?   In osteoporosis, the inside of the bones becomes porous from a loss of calcium (see the picture below). This is called losing bone mass. Over time, this weakens the bones and makes them more likely to break.  Osteoporosis is much more common in women than in men. This is because women have less bone mass than men, tend to live longer and take in less calcium, and need the female hormone estrogen to keep their bones strong. If men live long enough, they are also at risk of getting osteoporosis later in life.  Once total bone mass has peaked-around age 35-all adults start to lose it. In women, the rate of bone loss speeds up after menopause, when estrogen levels fall. Since the ovaries make estrogen, faster bone loss may also occur if both ovaries are removed by surgery.    What are the signs of osteoporosis?  You may not know you have osteoporosis until you have serious signs. Signs include broken bones, low back pain or a hunched back. You may also get shorter over time because osteoporosis can cause your vertebrae (the bones in your spine) to collapse. These problems tend to occur after a lot of bone calcium has already been lost.  Am I at risk for osteoporosis?     Risk factors for  "osteoporosis  Menopause before age 48   Surgery to remove ovaries before menopause   Not getting enough calcium   Not getting enough exercise   Smoking   Osteoporosis in your family   Alcohol abuse   Thin body and small bone frame   Fair skin ( or  race)   Hyperthyroidism   Long-term use of oral steroids     See the box to the right for a list of things that put you at risk for osteoporosis. The more of these that apply to you, the higher your risk is. Talk to your family doctor about your risk factors.  Will I need a bone density test?  Check with your doctor. For many women, osteoporosis (or the risk of it) can be diagnosed without testing. When testing is appropriate, doctors use equipment that takes a "picture" of the bones to see if they are becoming porous.  What about hormone replacement therapy?  Hormone replacement therapy (HRT) is one way to prevent osteoporosis or keep it from getting worse.  In HRT, you take hormones (estrogen and progestin together, or estrogen alone) to counteract the drop in estrogen that happens at menopause or when the ovaries are removed by surgery.  Women who take HRT are at an increased risk for breast cancer, heart attack, stroke, serious blood clots and Alzheimer's disease.  Many physicians now recommend that their patients on HRT stop taking it to prevent osteoporosis.  Factors such as your health history and your family's health history will be important when weighing the risks and benefits of HRT. Talk to your doctor about whether it's right for you.  What is calcitonin?   Calcitonin (some brand names: Calcimar, Miacalcin) is a hormone that helps prevent further bone loss and reduces the pain that some people have with osteoporosis.  Calcitonin can be taken as a shot or as a nasal spray. Its most common side effect is nausea.  What is ibandronate sodium?  Ibandronate sodium (brand name: Boniva) is a new drug that is taken once a month. It is not a hormone, but " it slows bone loss and increases bone density. Some of the possible side effects include upset stomach, heartburn, nausea and diarrhea.   What are alendronate and risedronate?  Alendronate (brand name: Fosamax) and risedronate (brand name: Actonel) are not hormones, but are used to help prevent and treat osteoporosis. These drugs help reduce the risk of fractures by decreasing the rate of bone loss. Their most common side effect is an upset stomach.  What is raloxifene?   Raloxifene (brand name: Evista) is a drug used to prevent and treat osteoporosis by increasing bone density. It is not a hormone, but it mimics some of the effects of estrogen. Side effects may include hot flashes and a risk of blood clots.  What is teriparatide?   Teriparatide (brand name: Forteo) is a new injectable synthetic hormone used once a day for the treatment of osteoporosis. It causes new bone growth. Common side effects may include nausea, dizziness and leg cramps.  How much calcium do I need?  Before menopause, you need about 1,000 mg of calcium per day. After menopause, you need 1,000 mg of calcium per day if you're taking estrogen and 1,500 mg of calcium per day if you're not taking estrogen.   It's usually best to try to get calcium from food. Nonfat and low-fat dairy products are good sources of calcium. Other sources of calcium include dried beans, sardines and broccoli.  About 300 mg of calcium are in each of the followin cup of milk or yogurt, 2 cups of broccoli, or 6 to 7 sardines.  If you don't get enough calcium from the food you eat, your doctor may suggest taking a calcium pill. Take it at meal time or with a sip of milk. Vitamin D and lactose (the natural sugar in milk) help your body absorb the calcium.    Tips to keep bones strong  Exercise.   Eat a well-balanced diet with at least 1,000 mg of calcium a day.   Quit smoking. Smoking makes osteoporosis worse.   Talk to your doctor about HRT or other medicines to  prevent or treat osteoporosis.         Reviewed/Updated: 04/05  Created: 1996  This handout provides a general overview on this topic and may not apply to everyone. To find out if this handout applies to you and to get more information on this subject, talk to your family doctor.   Copyright © 3394-7595 [employer]   Permission is granted to print and photocopy this material for nonprofit educational uses.   Written permission is required for all other uses, including electronic uses.  Home  Privacy Policy  Contact Us  About This Site  What's New                       Calcium in Your Diet   Why do I need calcium in my diet?   Calcium is the most abundant mineral in your body.  It is necessary for nerves to transmit messages to muscles.  In addition, it is needed for those muscles to be able to contract in response to the messages.  It is also needed for blood to clot.    Most importantly, however, calcium is stored in your bones and teeth and helps make them hard and strong.    How much calcium do I need in my diet?   The [place] National Institutes of Health Consensus Conference on Optimal Calcium Intake in 1994 recommended higher daily calcium intakes for older men and women than for younger adults.  The new recommendation for men between the ages of 51 and 65 is 1000 mg per day.  For men over the age of 65, a daily intake of 1500 mg is recommended.    For most women over the age of 50, the recommendation for daily calcium intake is 1500 mg.  For women taking estrogen, the recommendation is lowered to 1000 mg.    Which foods contain calcium?   Your body gets the calcium it needs from calcium-rich foods, primarily milk and dairy products.  Green leafy vegetables, broccoli, okra, fruit, eggs, fish, sardines with bones, and molasses are other good sources of calcium.    What happens if I don't get enough calcium?   If your body does not get enough calcium, you may experience muscle cramps  in your hands, feet, and face.    In addition, your bones may lose calcium and become thinner and weaker.  This condition is called osteoporosis, and it can result in:   · a gradual loss of height   · humping of the back   · bones that break easily   · serious fractures if you fall.    How can I take care of myself?   · If you are losing height or getting a hump in your back, see your doctor.  If you are diagnosed with osteoporosis, follow your doctor's treatment recommendations.    · Take calcium supplements if you are advised to do so.    · Eat more calcium-rich food: dairy products, green leafy vegetables, citrus fruit, and sardines.    · If you do not have an intolerance for dairy products, add cheese to salads and entrees and milk to casseroles and canned soups.  If you are trying to cut back on fat, use only nonfat milk and fat-free and reduced-fat cheese.    · Get plenty of exercise.  Walk a mile a day if you can.    Developed by Sarina Looney M.D., for [employer]          Please do not hesitate to call or message with any questions or concerns    Makenna Rodgers MD

## 2024-10-25 NOTE — TELEPHONE ENCOUNTER
Returned patient call she was calling due to receiving a call from us and did not know why, we confirmed her appointment in December with labs work appt on 12/2/24.  Patient verbalized understanding.

## 2024-10-28 ENCOUNTER — TELEPHONE (OUTPATIENT)
Dept: GASTROENTEROLOGY | Facility: CLINIC | Age: 64
End: 2024-10-28
Payer: MEDICARE

## 2024-10-30 ENCOUNTER — HOSPITAL ENCOUNTER (OUTPATIENT)
Dept: RADIOLOGY | Facility: HOSPITAL | Age: 64
Discharge: HOME | End: 2024-10-30
Attending: STUDENT IN AN ORGANIZED HEALTH CARE EDUCATION/TRAINING PROGRAM
Payer: MEDICARE

## 2024-10-30 DIAGNOSIS — M54.2 CERVICALGIA: ICD-10-CM

## 2024-10-30 DIAGNOSIS — M54.9 DORSALGIA, UNSPECIFIED: ICD-10-CM

## 2024-10-30 PROCEDURE — 72148 MRI LUMBAR SPINE W/O DYE: CPT | Mod: 26,,, | Performed by: RADIOLOGY

## 2024-10-30 PROCEDURE — 72141 MRI NECK SPINE W/O DYE: CPT | Mod: 26,,, | Performed by: RADIOLOGY

## 2024-10-30 PROCEDURE — 72141 MRI NECK SPINE W/O DYE: CPT | Mod: TC,PO

## 2024-10-30 PROCEDURE — 72148 MRI LUMBAR SPINE W/O DYE: CPT | Mod: TC,PO

## 2024-11-02 PROBLEM — G89.4 CHRONIC PAIN SYNDROME: Status: ACTIVE | Noted: 2024-11-02

## 2024-11-02 PROBLEM — K31.84 GASTROPARESIS: Status: ACTIVE | Noted: 2024-11-02

## 2024-11-02 PROBLEM — Z76.89 ENCOUNTER TO ESTABLISH CARE: Status: ACTIVE | Noted: 2024-11-02

## 2024-11-02 PROBLEM — E06.3 HASHIMOTO'S THYROIDITIS: Status: ACTIVE | Noted: 2024-11-02

## 2024-11-02 PROBLEM — M35.1 MCTD (MIXED CONNECTIVE TISSUE DISEASE): Status: ACTIVE | Noted: 2024-11-02

## 2024-11-02 PROBLEM — D72.819 LEUKOPENIA: Status: ACTIVE | Noted: 2024-11-02

## 2024-11-02 PROBLEM — I10 HYPERTENSION: Status: ACTIVE | Noted: 2024-11-02

## 2024-11-02 PROBLEM — K59.09 CHRONIC CONSTIPATION: Status: ACTIVE | Noted: 2024-11-02

## 2024-11-04 ENCOUNTER — OFFICE VISIT (OUTPATIENT)
Dept: GASTROENTEROLOGY | Facility: CLINIC | Age: 64
End: 2024-11-04
Payer: MEDICARE

## 2024-11-04 ENCOUNTER — LAB VISIT (OUTPATIENT)
Dept: LAB | Facility: HOSPITAL | Age: 64
End: 2024-11-04
Payer: MEDICARE

## 2024-11-04 VITALS — HEIGHT: 59 IN | BODY MASS INDEX: 22.14 KG/M2 | WEIGHT: 109.81 LBS

## 2024-11-04 DIAGNOSIS — Z90.49 S/P CHOLECYSTECTOMY: ICD-10-CM

## 2024-11-04 DIAGNOSIS — R74.01 ELEVATED ALT MEASUREMENT: ICD-10-CM

## 2024-11-04 DIAGNOSIS — R10.10 UPPER ABDOMINAL PAIN: ICD-10-CM

## 2024-11-04 DIAGNOSIS — R63.4 WEIGHT LOSS: ICD-10-CM

## 2024-11-04 DIAGNOSIS — R11.0 NAUSEA: ICD-10-CM

## 2024-11-04 DIAGNOSIS — R13.10 PILL DYSPHAGIA: ICD-10-CM

## 2024-11-04 DIAGNOSIS — K83.9 BILE DUCT ABNORMALITY: ICD-10-CM

## 2024-11-04 DIAGNOSIS — K31.84 GASTROPARESIS: ICD-10-CM

## 2024-11-04 DIAGNOSIS — Z90.49 S/P COLECTOMY: ICD-10-CM

## 2024-11-04 DIAGNOSIS — R93.3 ABNORMAL CT SCAN, GASTROINTESTINAL TRACT: ICD-10-CM

## 2024-11-04 DIAGNOSIS — K63.9 COLON WALL THICKENING: ICD-10-CM

## 2024-11-04 DIAGNOSIS — Z87.19 HISTORY OF CHRONIC CONSTIPATION: Primary | ICD-10-CM

## 2024-11-04 DIAGNOSIS — Z87.19 HISTORY OF GASTROESOPHAGEAL REFLUX (GERD): ICD-10-CM

## 2024-11-04 LAB
ALBUMIN SERPL BCP-MCNC: 4.1 G/DL (ref 3.5–5.2)
ALP SERPL-CCNC: 54 U/L (ref 40–150)
ALT SERPL W/O P-5'-P-CCNC: 85 U/L (ref 10–44)
AST SERPL-CCNC: 58 U/L (ref 10–40)
BILIRUB DIRECT SERPL-MCNC: 0.2 MG/DL (ref 0.1–0.3)
BILIRUB SERPL-MCNC: 0.4 MG/DL (ref 0.1–1)
PROT SERPL-MCNC: 7.4 G/DL (ref 6–8.4)

## 2024-11-04 PROCEDURE — 3288F FALL RISK ASSESSMENT DOCD: CPT | Mod: CPTII,S$GLB,, | Performed by: NURSE PRACTITIONER

## 2024-11-04 PROCEDURE — 1159F MED LIST DOCD IN RCRD: CPT | Mod: CPTII,S$GLB,, | Performed by: NURSE PRACTITIONER

## 2024-11-04 PROCEDURE — 3008F BODY MASS INDEX DOCD: CPT | Mod: CPTII,S$GLB,, | Performed by: NURSE PRACTITIONER

## 2024-11-04 PROCEDURE — 36415 COLL VENOUS BLD VENIPUNCTURE: CPT | Mod: PO | Performed by: NURSE PRACTITIONER

## 2024-11-04 PROCEDURE — 80076 HEPATIC FUNCTION PANEL: CPT | Performed by: NURSE PRACTITIONER

## 2024-11-04 PROCEDURE — 1160F RVW MEDS BY RX/DR IN RCRD: CPT | Mod: CPTII,S$GLB,, | Performed by: NURSE PRACTITIONER

## 2024-11-04 PROCEDURE — 99215 OFFICE O/P EST HI 40 MIN: CPT | Mod: S$GLB,,, | Performed by: NURSE PRACTITIONER

## 2024-11-04 PROCEDURE — 3044F HG A1C LEVEL LT 7.0%: CPT | Mod: CPTII,S$GLB,, | Performed by: NURSE PRACTITIONER

## 2024-11-04 PROCEDURE — 1101F PT FALLS ASSESS-DOCD LE1/YR: CPT | Mod: CPTII,S$GLB,, | Performed by: NURSE PRACTITIONER

## 2024-11-04 PROCEDURE — 4010F ACE/ARB THERAPY RXD/TAKEN: CPT | Mod: CPTII,S$GLB,, | Performed by: NURSE PRACTITIONER

## 2024-11-04 PROCEDURE — 99999 PR PBB SHADOW E&M-EST. PATIENT-LVL IV: CPT | Mod: PBBFAC,,, | Performed by: NURSE PRACTITIONER

## 2024-11-04 RX ORDER — FAMOTIDINE 40 MG/1
40 TABLET, FILM COATED ORAL NIGHTLY
Qty: 30 TABLET | Refills: 3 | Status: SHIPPED | OUTPATIENT
Start: 2024-11-04 | End: 2025-11-04

## 2024-11-04 NOTE — PATIENT INSTRUCTIONS
"Constipation in Adults   The Basics   Written by the doctors and editors at [facility]   What is constipation? -- Constipation is a common problem that makes it hard to have bowel movements. Your bowel movements might be:  Too hard  Too small  Hard to get out  Happening fewer than 3 times a week  What causes constipation? -- Constipation can be caused by:  Side effects of some medicines  Poor diet  Diseases of the digestive system (figure 1)  What other symptoms should I watch for? -- These symptoms could signal a more serious problem:  Blood in the toilet or on the toilet paper after having a bowel movement  Fever  Weight loss  Feeling weak  It could also be a sign of a problem if you have new constipation without a change in your medicines or diet, and have never had constipation in the past.   Is there anything I can do on my own to get rid of constipation? -- Yes. Try these steps:  Eat foods that have a lot of fiber. Good choices are fruits, vegetables, prune juice, and cereal (table 1).  Drink plenty of water and other fluids.  When you feel the need to go to the bathroom, go to the bathroom. Don't hold it.  Take laxatives. These are medicines that help make bowel movements easier to get out. Some are pills that you swallow. Others go into the rectum. These are called "suppositories."  Should I see a doctor or nurse? -- See your doctor or nurse if:   Your symptoms are new or not normal for you  You do not have a bowel movement for a few days  The problem comes and goes, but lasts for longer than 3 weeks  You are in a lot of pain  You have other symptoms that also worry you (for example, bleeding, weakness, weight loss, or fever)  Other people in your family have had colorectal cancer or inflammatory bowel disease  Are there tests I should have? -- Your doctor or nurse will decide which tests you should have based on your age, other symptoms, and individual situation. There are lots of tests, but you might not " "need any.  Here are the most common tests doctors use to find the cause of constipation:  Rectal exam - Your doctor will look at the outside of your anus. They will also use a finger to feel inside the opening.  Sigmoidoscopy or colonoscopy - For these tests, the doctor puts a thin tube into your anus. Then, they advance the tube into your large intestine. The large intestine is also called the colon. The tube has a camera attached to it, so the doctor can look inside your intestines. During these tests, the doctor can also take samples of tissue to look at under a microscope (figure 2).  X-rays, CT scan, or MRI - These create images of the inside of your body.  Manometry studies - Manometry allows the doctor to measure the pressure inside the rectum at various points. It can help the doctor find out if the muscles that control bowel movements are working right. The test also shows whether the person's rectum can feel normally.  How is constipation treated? -- That depends on what is causing your constipation. First, your doctor will want you to try eating more fiber and drinking more water. If that doesn't help, your doctor might suggest:  Medicines that you swallow or put in your rectum  Changing the medicines you are taking for other conditions  A treatment called an "enema" - For this treatment, a doctor or nurse will squirt water into your rectum. They might also use a thin tool to help break up bowel movements that are still inside you.  You might also be able to give yourself enema treatments at home, too. Enemas can be just water, or they can contain medicine to help with constipation.   Biofeedback - This is a technique that teaches you to relax your muscles so you can let go and push bowel movements out.  Can constipation be prevented? -- You can reduce your chances of getting constipation again by:  Eating a diet that is full of fiber (table 1)  Drinking water and other fluids during the day  Going to the " "bathroom at regular times every day  All topics are updated as new evidence becomes available and our peer review process is complete.  This topic retrieved from [employer] on: Sep 21, 2021.  Topic 37352 Version 8.0  Release: 29.4.2 - C29.263  © 2021 UpToDate, Inc. and/or its affiliates. All rights reserved.  figure 1: Digestive system     This drawing shows the organs in the body that process food. Together these organs are called "the digestive system," or "digestive tract." As food travels through this system, the body absorbs nutrients and water.  Graphic 33105 Version 4.0    table 1: Amount of fiber in different foods  Food  Serving  Grams of fiber    Fruits    Apple (with skin) 1 medium apple 4.4   Banana 1 medium banana 3.1   Oranges 1 orange 3.1   Prunes 1 cup, pitted 12.4   Juices    Apple, unsweetened, with added ascorbic acid 1 cup 0.5   Grapefruit, white, canned, sweetened 1 cup 0.2   Grape, unsweetened, with added ascorbic acid 1 cup 0.5   Orange 1 cup 0.7   Vegetables    Cooked   Green beans 1 cup 4.0   Carrots 1/2 cup sliced 2.3   Peas 1 cup 8.8   Potato (baked, with skin) 1 medium potato 3.8   Raw   Cucumber (with peel) 1 cucumber 1.5   Lettuce 1 cup shredded 0.5   Tomato 1 medium tomato 1.5   Spinach 1 cup 0.7   Legumes   Baked beans, canned, no salt added 1 cup 13.9   Kidney beans, canned 1 cup 13.6   Lima beans, canned 1 cup 11.6   Lentils, boiled 1 cup 15.6   Breads, pastas, flours    Bran muffins 1 medium muffin 5.2   Oatmeal, cooked 1 cup 4.0   White bread 1 slice 0.6   Whole-wheat bread 1 slice 1.9   Pasta and rice, cooked   Macaroni 1 cup 2.5   Rice, brown 1 cup 3.5   Rice, white 1 cup 0.6   Spaghetti (regular) 1 cup 2.5   Nuts    Almonds 1/2 cup 8.7   Peanuts 1/2 cup 7.9   To learn how much fiber and other nutrients are in different foods, visit the [place] Department of Agriculture (USDA) FoodData Central website.  Graphic 42479 Version 6.0  figure 2: Colonoscopy     During a " "colonoscopy, you lie on your side and the doctor puts a thin tube with a camera into your anus (from behind). Then the doctor advances the tube into the rectum and colon. The camera sends pictures from inside your colon to a television screen.  Graphic 25020 Version 6.0    Consumer Information Use and Disclaimer   This information is not specific medical advice and does not replace information you receive from your health care provider. This is only a brief summary of general information. It does NOT include all information about conditions, illnesses, injuries, tests, procedures, treatments, therapies, discharge instructions or life-style choices that may apply to you. You must talk with your health care provider for complete information about your health and treatment options. This information should not be used to decide whether or not to accept your health care provider's advice, instructions or recommendations. Only your health care provider has the knowledge and training to provide advice that is right for you. The use of this information is governed by the [employer] End User License Agreement, available at [email or web address].The use of [employer] content is governed by the [employer] Terms of Use. ©2021 UpToDate, Inc. All rights reserved.  Copyright   © 2021 UpToDate, Inc. and/or its affiliates. All rights reserved.      Diverticulitis   The Basics   Written by the doctors and editors at [employer]   What is diverticulitis? -- Diverticulitis is a disorder that can cause belly pain, fever, and problems with bowel movements.  The food we eat travels from the stomach through a long tube called the intestine. The last part of that tube is the colon (figure 1). The wall of the bowel becomes weak and pushes outward. They form balloon-like pouches called diverticula or "tics." When you have hard stool, you strain to have a bowel movement. This raises the pressure in the bowel and " "causes pouches or bulges to form. Most often, they do not cause a problem. Many people who have these pouches have no symptoms. If they become infected, you have diverticulitis. If you have both bleeding and infection it is diverticular disease.  What are the symptoms of diverticulitis? -- The most common symptom of diverticulitis is pain, which is usually in the lower part of the belly. Other symptoms can include:  Fever  Constipation  Diarrhea  Nausea and vomiting  Is there a test for diverticulitis? -- Yes. There are a few tests your doctor can do to find out if you have diverticulitis. But tests are not always needed. If you do have a test, you might have a:  CT scan - A CT scan is a kind of imaging test. Imaging tests create pictures of the inside of your body.  Abdominal ultrasound - This test uses sound waves to create pictures of your intestines.  How is diverticulitis treated? -- Diverticulitis is typically treated with antibiotics. You might also need to go on a clear liquid diet for a short time. If you only have mild symptoms, this might be all the treatment you need.   But if you have severe symptoms, or if you get a fever, you might need to stay in the hospital. There, you can get fluids and antibiotics through a thin tube that goes into your vein, called an "IV." That way you can stop eating and drinking until you get better.  If you have a serious infection, the doctor might put a tube into your belly to drain the infection. In very bad cases, people need surgery to remove the part of the colon that is affected.  A few months after your infection has been treated, your doctor might recommend that you have a procedure called a colonoscopy (figure 2). During a colonoscopy, the doctor can look directly inside your colon to get an idea of the number of diverticula in your colon and to find out where they are. At the same time, they can check for signs of cancer.  Should I change my diet if I have had " "diverticulitis? -- If you have had diverticulosis, it's a good idea to eat a lot of fiber. Good sources of fiber include fruits, oats, beans, peas, and green leafy vegetables. When you have diverticulitis and if you do not already eat fiber-rich foods, wait until after your symptoms get better to start a high fiber diet. Recommend low fiber diet until symptoms have resolved. See below for Low Fiber Diet information.  You do not need to avoid seeds, nuts, popcorn, or other similar foods.  All topics are updated as new evidence becomes available and our peer review process is complete.  This topic retrieved from [employer]) on: Sep 21, 2021.  Topic 91307 Version 10.0  Release: 29.4.2 - C29.263  © 2021 UpToDate, Inc. and/or its affiliates. All rights reserved.  figure 1: Digestive system     This drawing shows the organs in the body that process food. Together these organs are called "the digestive system," or "digestive tract." As food travels through this system, the body absorbs nutrients and water.  Graphic 98623 Version 4.0    figure 2: Colonoscopy     During a colonoscopy, you lie on your side and the doctor puts a thin tube with a camera into your anus (from behind). Then the doctor advances the tube into the rectum and colon. The camera sends pictures from inside your colon to a television screen.  Graphic 49300 Version 6.0    Consumer Information Use and Disclaimer   This information is not specific medical advice and does not replace information you receive from your health care provider. This is only a brief summary of general information. It does NOT include all information about conditions, illnesses, injuries, tests, procedures, treatments, therapies, discharge instructions or life-style choices that may apply to you. You must talk with your health care provider for complete information about your health and treatment options. This information should not be used to decide whether or not to accept your health " care provider's advice, instructions or recommendations. Only your health care provider has the knowledge and training to provide advice that is right for you. The use of this information is governed by the [employer] End User License Agreement, available at [email or web address]/solutions/[employer]/[email or web address]/susanna.The use of [employer] content is governed by the [employer] Terms of Use. ©2021 UpToDate, Inc. All rights reserved.  Copyright   © 2021 UpToDate, Inc. and/or its affiliates. All rights reserved.     Low Fiber Diet   About this topic   A low fiber diet contains foods that are low in fiber and easy to digest. This makes it easier for your body to break down the food you eat. A low fiber diet means you are eating less than 13 grams of fiber a day. A low fiber diet will cause less pressure on your bowels and may help soothe your GI tract. You will also have fewer bowel movements.       When is this diet used?   Talk to your doctor before you start a low fiber diet. You should only use a low fiber diet for a short time until your signs improve. Then, once you are able to increase your fiber, slowly add foods back into your diet one at a time. A low fiber diet may also need extra nutrients if it is used over a longer period of time. Talk with your doctor about adding vitamin supplements to your diet.  Who should use this diet?   This diet may be helpful for people with:  Diverticulitis or inflammatory bowel disease, like Crohn's or ulcerative colitis, during times of flare-up  Gastroparesis  Short periods of bowel cramping or loose stools  Narrowing of the bowel  Rectal bleeding  Some kinds of surgery, like hemorrhoidectomy, colostomy, ileostomy, or abdominal surgery  What foods are good to eat?   Meats and proteins like:  Tender, well cooked meats  Chicken  Fish  Eggs  Smooth nut butters  Tofu  Breads and grains with less than 2 grams of fiber per serving like:  White bread  Crackers  Rudy crackers  Flour or  corn tortillas  White rice, well-cooked  White pasta  English muffins  Cream of wheat or rice  Grits  Cold or hot cereals made from white flour, such as puffed rice or corn flakes  Milk products like:  Milk  Cheese  Yogurt without nuts, fruit, and granola  Kefir  Ice cream  Sherbet  Fortified nondairy milks: [place], cashew, coconut, or rice  Fruits and vegetables like:  Bananas  Applesauce  Melons without seeds  Fruit or vegetable juice without pulp  Soft, canned, or well-cooked fruits or vegetables without seeds, skins, or membranes  Tomato sauce  Mashed potatoes without skin  What foods should be limited or avoided?   Meats and proteins like:  [place] nut butters  Dried peas, beans, and lentils  Nuts or seeds  Fried meat, poultry, and fish  Steak, pork chops, and other meats that are fatty or have gristle  Seafood with a tough or rubbery texture, such as shrimp  Luncheon meats, such as bologna and salami  Sausage, reno, and hot dogs  Hummus  Breads and grains like:  Whole wheat breads, tortillas, crackers, and cereals  Brown rice  Quinoa, kasha, and barley  Breads or crackers with nuts or seeds  Whole grain and high-fiber cereals, including oatmeal, bran flakes, and shredded wheat  Whole wheat pasta  Popcorn  Milk products like:  Yogurt with added fruit, nuts, and granola  Fruits and vegetables like:  Raw or dried fruits or vegetables  Undercooked fruits or vegetables  Pineapple  Salads  Broccoli  Cauliflower  Corn  Potatoes with skin  Leafy greens like kale or saran greens  Dried fruit  Fruit or vegetable juice with pulp  Helpful tips   Sample Daily Menu   Breakfast Lunch Dinner   1/2 cup (120 mL) orange juice with no pulp Tuna fish sandwich on white bread 3 ounces (90 grams) broiled fish or chicken   1 cup (240 grams) cereal, like cheerios or corn flakes 1 cup (240 mL) tomato soup with saltine crackers 1/2 cup (120 grams) steamed vegetables, like squash, green beans, or carrots   1/2 cup (120 mL) low-fat milk  1/2 cup (120 grams) white rice or mashed potatoes, or medium baked potato (no skin) 1/2 cup (120 grams) white rice or mashed potatoes, or medium baked potato (no skin)   1 piece white toast 1 piece white or rye bread 1 piece white or rye bread   1 teaspoon (5 grams) margarine or butter 1 teaspoon (5 grams) margarine or butter 1 teaspoon (5 grams) margarine or butter   poached egg or egg substitute banana 1/2 cup (120 mL) applesauce   Coffee or tea, with sugar and nondairy creamer 1/2 cup (120 mL) juice (apple, tomato) Coffee or tea, with sugar and nondairy creamer   Use breads and grains made from refined flour, pasta, and white rice. Do not use whole grain products.  Stay away from seeds, nuts, and raw or dried fruits. Also avoid raisins, berries, and foods that have these things in them.  Stay away from fresh fruits and vegetables that contain skins, seeds, and membranes.  Stay away from juices with pulp.  Remove skin from fruits and vegetables before cooking.  Stay away from prunes and prune juice.  Stay away from dried beans and lentils.  Limit milk and milk products if they make loose stools worse. Do not drink milk if you are lactose intolerant.  When you add fiber back into your diet be sure to:  Check with your doctor first.  Add fiber in slowly, like one meal per day.  Let your body adjust before you add in more fiber.  Stay hydrated to help move fiber in your bowels.  Last Reviewed Date   2021-09-23  Consumer Information Use and Disclaimer   This information is not specific medical advice and does not replace information you receive from your health care provider. This is only a brief summary of general information. It does NOT include all information about conditions, illnesses, injuries, tests, procedures, treatments, therapies, discharge instructions or life-style choices that may apply to you. You must talk with your health care provider for complete information about your health and treatment options.  This information should not be used to decide whether or not to accept your health care providers advice, instructions or recommendations. Only your health care provider has the knowledge and training to provide advice that is right for you.  Copyright   Copyright © 2021 UpToDate, Inc. and its affiliates and/or licensors. All rights reserved.

## 2024-11-04 NOTE — PROGRESS NOTES
Subjective:       Patient ID: Malu Holland is a 64 y.o. female Body mass index is 22.17 kg/m².    Chief Complaint: Constipation, Gastroesophageal Reflux, and gastroparesis    This patient is new to me.  Referring Provider: Dr. Makenna Rodgers for chronic constipation, diverticulitis, & s/p colectomy.     Patient did not bring copy of outside medical records to visit for review.    GI Problem  The primary symptoms include weight loss (reports not trying to lose weight), abdominal pain and nausea (occasional, taking zofran 4 mg PRN). Primary symptoms do not include fever, fatigue, vomiting, diarrhea, melena, hematochezia or dysuria.   The abdominal pain began more than 2 days ago (chronic started at least since 2014). The abdominal pain is located in the RUQ, epigastric region and LUQ. The severity of the abdominal pain is 2/10 (currently).   The illness is also significant for dysphagia (occasional with pills; denies problems with food or liquids) and constipation (chronic since first colon resection; bowel movements are currently 2-3 times a day in the morning of stool rated 5-6 on bristol stool scale with taking colace BID, glycolax 17 grams BID). The illness does not include chills or odynophagia. Associated symptoms comments: PAST TREATMENT: bentyl 20 mg- patient stopped due to concern that it may be elevating her LFTs; linzess 72 mcg caused bloating. Significant associated medical issues include GERD (reflux occurs almost daily depsite taking protonix 40 mg once daily; PAST TREATMENT: prilosec), bowel resection and diverticulitis.     Review of Systems   Constitutional:  Positive for weight loss (reports not trying to lose weight). Negative for appetite change, chills, fatigue and fever.        Taking norco PRN- reports rare use; reports eating 6 small meals/snacks a day   HENT:  Negative for sore throat.    Respiratory:  Negative for cough, choking and shortness of breath.    Cardiovascular:  Negative for  "chest pain.   Gastrointestinal:  Positive for abdominal pain, constipation (chronic since first colon resection; bowel movements are currently 2-3 times a day in the morning of stool rated 5-6 on bristol stool scale with taking colace BID, glycolax 17 grams BID), dysphagia (occasional with pills; denies problems with food or liquids) and nausea (occasional, taking zofran 4 mg PRN). Negative for anal bleeding, blood in stool, diarrhea, hematochezia, melena, rectal pain and vomiting.   Genitourinary:  Negative for difficulty urinating, dysuria and flank pain.        Reports scheduled to see breast specialist soon due to breast nodules   Neurological:  Negative for weakness.       No LMP recorded. Patient is postmenopausal.  Past Medical History:   Diagnosis Date    Acid reflux     Diabetes mellitus     Dry mouth     Gastroparesis     Hypertension      Past Surgical History:   Procedure Laterality Date    ADENOIDECTOMY      APPENDECTOMY       SECTION      CHOLECYSTECTOMY      COLON SURGERY      x 2; first was in  Dr. Conner due to diverticulitis; second one done by Dr. Thomas    COLONOSCOPY  2023    done in [place]; repots part of her colon resection "opened up"    HYSTERECTOMY      LASIK      TONSILLECTOMY      UPPER GASTROINTESTINAL ENDOSCOPY  2023    done at [facility]     Family History   Problem Relation Name Age of Onset    Lupus Mother      Osteoarthritis Mother      Heart disease Mother      Heart disease Father      Cancer Father      Lupus Sister      Colon cancer Neg Hx      Crohn's disease Neg Hx      Ulcerative colitis Neg Hx      Stomach cancer Neg Hx      Esophageal cancer Neg Hx       Social History     Tobacco Use    Smoking status: Every Day     Types: Vaping with nicotine     Last attempt to quit: 2012     Years since quittin.9    Smokeless tobacco: Never   Substance Use Topics    Alcohol use: Not Currently     Comment: sober    Drug use: Never     Wt " Readings from Last 10 Encounters:   11/04/24 49.8 kg (109 lb 12.6 oz)   10/24/24 50.2 kg (110 lb 11.2 oz)   10/16/24 52.2 kg (115 lb)   08/09/24 49.9 kg (110 lb)   04/12/24 51.5 kg (113 lb 8.6 oz)   09/28/23 49.6 kg (109 lb 5.6 oz)   02/28/23 50.8 kg (112 lb)   08/30/22 52.4 kg (115 lb 10.1 oz)   02/14/22 50.2 kg (110 lb 10.7 oz)   07/12/21 51.7 kg (114 lb)     Lab Results   Component Value Date    WBC 4.44 10/25/2024    WBC 4.44 10/25/2024    HGB 13.8 10/25/2024    HGB 13.8 10/25/2024    HCT 42.3 10/25/2024    HCT 42.3 10/25/2024    MCV 94 10/25/2024    MCV 94 10/25/2024     10/25/2024     10/25/2024     CMP  Sodium   Date Value Ref Range Status   10/25/2024 140 136 - 145 mmol/L Final     Potassium   Date Value Ref Range Status   10/25/2024 3.6 3.5 - 5.1 mmol/L Final     Chloride   Date Value Ref Range Status   10/25/2024 101 95 - 110 mmol/L Final     CO2   Date Value Ref Range Status   10/25/2024 29 23 - 29 mmol/L Final     Glucose   Date Value Ref Range Status   10/25/2024 116 (H) 70 - 110 mg/dL Final     BUN   Date Value Ref Range Status   10/25/2024 19 8 - 23 mg/dL Final     Creatinine   Date Value Ref Range Status   10/25/2024 0.7 0.5 - 1.4 mg/dL Final     Calcium   Date Value Ref Range Status   10/25/2024 10.0 8.7 - 10.5 mg/dL Final     Total Protein   Date Value Ref Range Status   10/25/2024 7.7 6.0 - 8.4 g/dL Final     Albumin   Date Value Ref Range Status   10/25/2024 4.1 3.5 - 5.2 g/dL Final     Total Bilirubin   Date Value Ref Range Status   10/25/2024 0.4 0.1 - 1.0 mg/dL Final     Comment:     For infants and newborns, interpretation of results should be based  on gestational age, weight and in agreement with clinical  observations.    Premature Infant recommended reference ranges:  Up to 24 hours.............<8.0 mg/dL  Up to 48 hours............<12.0 mg/dL  3-5 days..................<15.0 mg/dL  6-29 days.................<15.0 mg/dL       Alkaline Phosphatase   Date Value Ref Range  Status   10/25/2024 57 40 - 150 U/L Final     AST   Date Value Ref Range Status   10/25/2024 31 10 - 40 U/L Final     ALT   Date Value Ref Range Status   10/25/2024 57 (H) 10 - 44 U/L Final     Anion Gap   Date Value Ref Range Status   10/25/2024 10 8 - 16 mmol/L Final     eGFR if    Date Value Ref Range Status   02/18/2020 >60.0 >60 mL/min/1.73 m^2 Final     eGFR    Date Value Ref Range Status   09/29/2022 >105 >89 mL/min Final     eGFR if non    Date Value Ref Range Status   02/18/2020 >60.0 >60 mL/min/1.73 m^2 Final     Comment:     Calculation used to obtain the estimated glomerular filtration  rate (eGFR) is the CKD-EPI equation.        Lab Results   Component Value Date    TSH 0.514 08/08/2024     Lab Results   Component Value Date    HEPBIGM Negative 02/08/2022    HEPCAB Negative 02/08/2022     Reviewed prior medical records in care everywhere including radiology report of 8/21/2024 limited abdominal ultrasound; 3/26/2024 CT abdomen pelvis; 7/3/2023 Upper GI; 2/17/2020 gastric emptying (delayed); & 5/16/2017 MRI abdomen.    Objective:      Physical Exam  Vitals and nursing note reviewed.   Constitutional:       General: She is not in acute distress.     Appearance: Normal appearance. She is well-developed. She is not diaphoretic.   HENT:      Mouth/Throat:      Lips: Pink. No lesions.      Mouth: Mucous membranes are moist. No oral lesions.      Tongue: No lesions.      Pharynx: Oropharynx is clear. No pharyngeal swelling or posterior oropharyngeal erythema.   Eyes:      General: No scleral icterus.     Conjunctiva/sclera: Conjunctivae normal.   Pulmonary:      Effort: Pulmonary effort is normal. No respiratory distress.      Breath sounds: Normal breath sounds. No wheezing.   Abdominal:      General: Bowel sounds are normal. There is no distension or abdominal bruit.      Palpations: Abdomen is soft. Abdomen is not rigid. There is no mass.      Tenderness:  There is no abdominal tenderness. There is no guarding or rebound. Negative signs include Cabrera's sign and McBurney's sign.   Skin:     General: Skin is warm and dry.      Coloration: Skin is not jaundiced or pale.      Findings: No erythema or rash.   Neurological:      Mental Status: She is alert and oriented to person, place, and time.   Psychiatric:         Behavior: Behavior normal.         Thought Content: Thought content normal.         Judgment: Judgment normal.         Assessment:       1. History of chronic constipation    2. Abnormal CT scan, gastrointestinal tract    3. Colon wall thickening    4. S/P colectomy    5. S/P cholecystectomy    6. Elevated ALT measurement    7. Bile duct abnormality    8. Upper abdominal pain    9. History of gastroesophageal reflux (GERD)    10. Gastroparesis    11. Nausea    12. Weight loss    13. Pill dysphagia        Plan:     Patient agreed to sign medical records release consent for us to obtain records from [facility] (to include but not limited to recent imaging and endoscopies)    History of chronic constipation  - Recommend daily exercise as tolerated, adequate water intake (six 8-oz glasses of water daily).  - CONTINUE OTC stool softener such as Colace as directed to avoid hard stools and straining with bowel movements PRN  - TAKE OTC MiraLax once daily (17g PO) as directed on packaging  -If still no improvement with these measures, call/follow-up  - discussed with patient that a side effect of narcotic pain medications is constipation, advised patient to talk to provider who manages pain medication, patient verbalized understanding    Abnormal CT scan, gastrointestinal tract: Colon wall thickening & S/P colectomy  - Recommend follow-up with surgeon, Dr. Thomas, for continued evaluation and management.  - requested copy of prior colonoscopy for review    S/P cholecystectomy  -     Hepatic Function Panel; Future; Expected date: 11/04/2024    Elevated  ALT measurement  -     Hepatic Function Panel; Future; Expected date: 11/04/2024  - minimize/avoid alcohol and tylenol products, & follow-up with PCP for continued evaluation and management; if specialist is needed, recommend seeing hepatology.    Bile duct abnormality  Comments:  chronic unchanged on 8/21/2024 ultrasound in care everywhere  Orders:  -     Hepatic Function Panel; Future; Expected date: 11/04/2024    Upper abdominal pain  -  START   famotidine (PEPCID) 40 MG tablet; Take 1 tablet (40 mg total) by mouth every evening.  Dispense: 30 tablet; Refill: 3  - schedule EGD, discussed procedure with patient, including risks and benefits, patient verbalized understanding  - avoid/minimize use of NSAIDs- since they can cause GI upset, bleeding and/or ulcers. If NSAID must be taken, recommend take with food.    History of gastroesophageal reflux (GERD)  -  START   famotidine (PEPCID) 40 MG tablet; Take 1 tablet (40 mg total) by mouth every evening.  Dispense: 30 tablet; Refill: 3  - CONTINUE PROTONIX 40 MG ONCE DAILY AS DIRECTED  - schedule EGD, discussed procedure with patient, including risks and benefits, patient verbalized understanding    Gastroparesis  - schedule EGD, discussed procedure with patient, including risks and benefits, patient verbalized understanding  - CONTINUE 6 small frequent meals instead of 3 big meals a day, low fat meals & low residue diet.  - Avoid: high fiber (insoluble fiber), red meats, dairy, and non-digestible solids (peels, fruit pulp, etc). Avoid NSAIDs and narcotics.  - possible medical therapy and discussed that this would be managed by one of the GI doctors, lastly possible referral to general surgery for surgical options, patient verbalized understanding    Nausea  - schedule EGD, discussed procedure with patient, including risks and benefits, patient verbalized understanding  - CONTINUE ZOFRAN PRN AS DIRECTED FOR NAUSEA    Weight loss  - schedule EGD, discussed procedure  with patient, including risks and benefits, patient verbalized understanding  - encouraged PO intake and daily calorie counts to ensure adequate nutrition is taken in, recommend at least 2,000 calories a day  - recommend nutritional drinks, such as Boost, Ensure or Glucerna, to supplement nutrition needs    Pill dysphagia  - schedule EGD, discussed procedure with patient and possible esophageal dilation may be performed during procedure if indicated, patient verbalized understanding  - recommend to eat smaller more frequent meals and to eat slowly and advised to eat a soft diet. Take medications one at a time with a full glass of water.  - possible UGI/esophagram/esophageal manometry if symptoms persist    Follow up in about 1 month (around 12/4/2024), or if symptoms worsen or fail to improve.      If no improvement in symptoms or symptoms worsen, call/follow-up at clinic or go to ER.        45 minutes of total time spent on the encounter, which includes face to face time and non-face to face time preparing to see the patient (e.g., review of tests), Obtaining and/or reviewing separately obtained history, Documenting clinical information in the electronic or other health record, Independently interpreting results (not separately reported) and communicating results to the patient/family/caregiver, or Care coordination (not separately reported).

## 2024-11-05 ENCOUNTER — TELEPHONE (OUTPATIENT)
Dept: PAIN MEDICINE | Facility: CLINIC | Age: 64
End: 2024-11-05
Payer: MEDICARE

## 2024-11-12 DIAGNOSIS — R74.01 ELEVATED ALT MEASUREMENT: Primary | ICD-10-CM

## 2024-11-14 ENCOUNTER — HOSPITAL ENCOUNTER (OUTPATIENT)
Facility: HOSPITAL | Age: 64
Discharge: HOME | End: 2024-11-14
Attending: INTERNAL MEDICINE | Admitting: STUDENT IN AN ORGANIZED HEALTH CARE EDUCATION/TRAINING PROGRAM
Payer: MEDICARE

## 2024-11-14 ENCOUNTER — ANESTHESIA EVENT (OUTPATIENT)
Dept: ENDOSCOPY | Facility: HOSPITAL | Age: 64
End: 2024-11-14
Payer: MEDICARE

## 2024-11-14 ENCOUNTER — ANESTHESIA (OUTPATIENT)
Dept: ENDOSCOPY | Facility: HOSPITAL | Age: 64
End: 2024-11-14
Payer: MEDICARE

## 2024-11-14 VITALS
WEIGHT: 104 LBS | HEIGHT: 59 IN | SYSTOLIC BLOOD PRESSURE: 138 MMHG | HEART RATE: 72 BPM | TEMPERATURE: 98 F | RESPIRATION RATE: 18 BRPM | OXYGEN SATURATION: 97 % | BODY MASS INDEX: 20.96 KG/M2 | DIASTOLIC BLOOD PRESSURE: 74 MMHG

## 2024-11-14 DIAGNOSIS — R10.9 ABDOMINAL PAIN: ICD-10-CM

## 2024-11-14 PROBLEM — R13.10 PILL DYSPHAGIA: Status: ACTIVE | Noted: 2024-11-14

## 2024-11-14 LAB — GLUCOSE SERPL-MCNC: 125 MG/DL (ref 70–110)

## 2024-11-14 PROCEDURE — 37000008 HC ANESTHESIA 1ST 15 MINUTES: Mod: PO | Performed by: INTERNAL MEDICINE

## 2024-11-14 PROCEDURE — 82962 GLUCOSE BLOOD TEST: CPT | Mod: PO | Performed by: INTERNAL MEDICINE

## 2024-11-14 PROCEDURE — C1769 GUIDE WIRE: HCPCS | Mod: PO | Performed by: INTERNAL MEDICINE

## 2024-11-14 PROCEDURE — 43239 EGD BIOPSY SINGLE/MULTIPLE: CPT | Mod: 59,PO | Performed by: INTERNAL MEDICINE

## 2024-11-14 PROCEDURE — 43239 EGD BIOPSY SINGLE/MULTIPLE: CPT | Mod: 59,,, | Performed by: INTERNAL MEDICINE

## 2024-11-14 PROCEDURE — 63600175 PHARM REV CODE 636 W HCPCS: Mod: PO | Performed by: NURSE ANESTHETIST, CERTIFIED REGISTERED

## 2024-11-14 PROCEDURE — 43248 EGD GUIDE WIRE INSERTION: CPT | Mod: ,,, | Performed by: INTERNAL MEDICINE

## 2024-11-14 PROCEDURE — 88305 TISSUE EXAM BY PATHOLOGIST: CPT | Mod: PO | Performed by: PATHOLOGY

## 2024-11-14 PROCEDURE — 88342 IMHCHEM/IMCYTCHM 1ST ANTB: CPT | Mod: PO | Performed by: PATHOLOGY

## 2024-11-14 PROCEDURE — 37000009 HC ANESTHESIA EA ADD 15 MINS: Mod: PO | Performed by: INTERNAL MEDICINE

## 2024-11-14 PROCEDURE — 43248 EGD GUIDE WIRE INSERTION: CPT | Mod: PO | Performed by: INTERNAL MEDICINE

## 2024-11-14 PROCEDURE — 27201012 HC FORCEPS, HOT/COLD, DISP: Mod: PO | Performed by: INTERNAL MEDICINE

## 2024-11-14 RX ORDER — PROPOFOL 10 MG/ML
VIAL (ML) INTRAVENOUS
Status: DISCONTINUED | OUTPATIENT
Start: 2024-11-14 | End: 2024-11-14

## 2024-11-14 RX ORDER — LIDOCAINE HYDROCHLORIDE 20 MG/ML
INJECTION INTRAVENOUS
Status: DISCONTINUED | OUTPATIENT
Start: 2024-11-14 | End: 2024-11-14

## 2024-11-14 RX ORDER — SODIUM CHLORIDE 0.9 % (FLUSH) 0.9 %
10 SYRINGE (ML) INJECTION
Status: DISCONTINUED | OUTPATIENT
Start: 2024-11-14 | End: 2024-11-14 | Stop reason: HOSPADM

## 2024-11-14 RX ORDER — SODIUM CHLORIDE, SODIUM LACTATE, POTASSIUM CHLORIDE, CALCIUM CHLORIDE 600; 310; 30; 20 MG/100ML; MG/100ML; MG/100ML; MG/100ML
INJECTION, SOLUTION INTRAVENOUS CONTINUOUS
Status: DISCONTINUED | OUTPATIENT
Start: 2024-11-14 | End: 2024-11-14 | Stop reason: HOSPADM

## 2024-11-14 RX ADMIN — PROPOFOL 25 MG: 10 INJECTION, EMULSION INTRAVENOUS at 12:11

## 2024-11-14 RX ADMIN — PROPOFOL 25 MG: 10 INJECTION, EMULSION INTRAVENOUS at 11:11

## 2024-11-14 RX ADMIN — LIDOCAINE HYDROCHLORIDE 100 MG: 20 INJECTION INTRAVENOUS at 11:11

## 2024-11-14 RX ADMIN — PROPOFOL 100 MG: 10 INJECTION, EMULSION INTRAVENOUS at 11:11

## 2024-11-14 NOTE — DISCHARGE INSTRUCTIONS
Recovery After Procedural Sedation (Adult)   You have been given medicine by vein to make you sleep during your surgery. This may have included both a pain medicine and sleeping medicine. Most of the effects have worn off. But you may still have some drowsiness for the next 6 to 8 hours.  Home care  Follow these guidelines when you get home:  For the next 8 hours, you should be watched by a responsible adult. This person should make sure your condition is not getting worse.  Don't drink any alcohol for the next 24 hours.  Don't drive, operate dangerous machinery, or make important business or personal decisions during the next 24 hours.  To prevent injury or falls, use caution when standing and walking for at least 24 hours after your procedure.  Note: Your healthcare provider may tell you not to take any medicine by mouth for pain or sleep in the next 4 hours. These medicines may react with the medicines you were given in the hospital. This could cause a much stronger response than usual.  Follow-up care  Follow up with your healthcare provider if you are not alert and back to your usual level of activity within 12 hours.  When to seek medical advice  Call your healthcare provider right away if any of these occur:  Drowsiness gets worse  Weakness or dizziness gets worse  Repeated vomiting  You can't be awakened  Fever  New rash  [email or web address] last reviewed this educational content on 9/1/2019  © 7345-1363 [employer], [employer]. [address] [place] [address], [place] [address]. All rights reserved. This information is not intended as a substitute for professional medical care. Always follow your healthcare professional's instructions         Tips to Control Acid Reflux    To control acid reflux, youll need to make some basic diet and lifestyle changes. The simple steps outlined below may be all youll need to ease discomfort.  Watch what you eat  Avoid fatty foods and spicy foods.  Eat fewer acidic foods, such as citrus  and tomato-based foods. These can increase symptoms.  Limit drinking alcohol, caffeine, and fizzy beverages. All increase acid reflux.  Try limiting chocolate, peppermint, and spearmint. These can worsen acid reflux in some people.  Watch when you eat  Avoid lying down for 3 hours after eating.  Do not snack before going to bed.  Raise your head  Raising your head and upper body by 4 to 6 inches helps limit reflux when youre lying down. Put blocks under the head of your bed frame to raise it.  Other changes  Lose weight, if you need to  Dont exercise near bedtime  Avoid tight-fitting clothes  Limit aspirin and ibuprofen  Stop smoking   Date Last Reviewed: 7/1/2016  © 2899-8069 [employer], [employer]. [address] [place] [address], [place] [address]. All rights reserved. This information is not intended as a substitute for professional medical care. Always follow your healthcare professional's instructions.

## 2024-11-14 NOTE — H&P
History & Physical - Short Stay  Gastroenterology      SUBJECTIVE:     Procedure: Gastroscopy    Chief Complaint/Indication for Procedure: Abdominal pain and nausea and weight loss.  Dysphagia.    History of Present Illness:  See recent GI OV note:  Office Visit   11/4/2024  [place] - Gastroenterology       Dulce Mcclendon FNP  Gastroenterology History of chronic constipation +12 more  Dx Constipation  Gastroesophageal Reflux  gastroparesis; Referred by Makenna Rodgers MD  Reason for Visit     Progress Notes    Dulce Mcclendon FNP at 11/4/2024  9:00 AM    Status: Signed   Expand All Collapse All  Subjective:         Subjective  Patient ID: Malu Holland is a 64 y.o. female Body mass index is 22.17 kg/m².     Chief Complaint: Constipation, Gastroesophageal Reflux, and gastroparesis     This patient is new to me.  Referring Provider: Dr. Makenna Rodgers for chronic constipation, diverticulitis, & s/p colectomy.     Patient did not bring copy of outside medical records to visit for review.     GI Problem  The primary symptoms include weight loss (reports not trying to lose weight), abdominal pain and nausea (occasional, taking zofran 4 mg PRN). Primary symptoms do not include fever, fatigue, vomiting, diarrhea, melena, hematochezia or dysuria.   The abdominal pain began more than 2 days ago (chronic started at least since 2014). The abdominal pain is located in the RUQ, epigastric region and LUQ. The severity of the abdominal pain is 2/10 (currently).   The illness is also significant for dysphagia (occasional with pills; denies problems with food or liquids) and constipation (chronic since first colon resection; bowel movements are currently 2-3 times a day in the morning of stool rated 5-6 on bristol stool scale with taking colace BID, glycolax 17 grams BID). The illness does not include chills or odynophagia. Associated symptoms comments: PAST TREATMENT: bentyl 20 mg- patient stopped due to concern  that it may be elevating her LFTs; linzess 72 mcg caused bloating. Significant associated medical issues include GERD (reflux occurs almost daily depsite taking protonix 40 mg once daily; PAST TREATMENT: prilosec), bowel resection and diverticulitis.      Review of Systems   Constitutional:  Positive for weight loss (reports not trying to lose weight). Negative for appetite change, chills, fatigue and fever.        Taking norco PRN- reports rare use; reports eating 6 small meals/snacks a day   HENT:  Negative for sore throat.    Respiratory:  Negative for cough, choking and shortness of breath.    Cardiovascular:  Negative for chest pain.   Gastrointestinal:  Positive for abdominal pain, constipation (chronic since first colon resection; bowel movements are currently 2-3 times a day in the morning of stool rated 5-6 on bristol stool scale with taking colace BID, glycolax 17 grams BID), dysphagia (occasional with pills; denies problems with food or liquids) and nausea (occasional, taking zofran 4 mg PRN). Negative for anal bleeding, blood in stool, diarrhea, hematochezia, melena, rectal pain and vomiting.           Wt Readings from Last 10 Encounters:   11/04/24 49.8 kg (109 lb 12.6 oz)   10/24/24 50.2 kg (110 lb 11.2 oz)   10/16/24 52.2 kg (115 lb)   08/09/24 49.9 kg (110 lb)   04/12/24 51.5 kg (113 lb 8.6 oz)   09/28/23 49.6 kg (109 lb 5.6 oz)   02/28/23 50.8 kg (112 lb)   08/30/22 52.4 kg (115 lb 10.1 oz)   02/14/22 50.2 kg (110 lb 10.7 oz)   07/12/21 51.7 kg (114 lb)       Assessment:      Assessment  1. History of chronic constipation    2. Abnormal CT scan, gastrointestinal tract    3. Colon wall thickening    4. S/P colectomy    5. S/P cholecystectomy    6. Elevated ALT measurement    7. Bile duct abnormality    8. Upper abdominal pain    9. History of gastroesophageal reflux (GERD)    10. Gastroparesis    11. Nausea    12. Weight loss    13. Pill dysphagia          Plan:      Plan  Patient agreed to sign  medical records release consent for us to obtain records from [facility] (to include but not limited to recent imaging and endoscopies)     History of chronic constipation  - Recommend daily exercise as tolerated, adequate water intake (six 8-oz glasses of water daily).  - CONTINUE OTC stool softener such as Colace as directed to avoid hard stools and straining with bowel movements PRN  - TAKE OTC MiraLax once daily (17g PO) as directed on packaging  -If still no improvement with these measures, call/follow-up  - discussed with patient that a side effect of narcotic pain medications is constipation, advised patient to talk to provider who manages pain medication, patient verbalized understanding     Abnormal CT scan, gastrointestinal tract: Colon wall thickening & S/P colectomy  - Recommend follow-up with surgeon, Dr. Thomas, for continued evaluation and management.  - requested copy of prior colonoscopy for review     S/P cholecystectomy  -     Hepatic Function Panel; Future; Expected date: 11/04/2024     Elevated ALT measurement  -     Hepatic Function Panel; Future; Expected date: 11/04/2024  - minimize/avoid alcohol and tylenol products, & follow-up with PCP for continued evaluation and management; if specialist is needed, recommend seeing hepatology.     Bile duct abnormality  Comments:  chronic unchanged on 8/21/2024 ultrasound in care everywhere  Orders:  -     Hepatic Function Panel; Future; Expected date: 11/04/2024     Upper abdominal pain  -  START   famotidine (PEPCID) 40 MG tablet; Take 1 tablet (40 mg total) by mouth every evening.  Dispense: 30 tablet; Refill: 3  - schedule EGD, discussed procedure with patient, including risks and benefits, patient verbalized understanding  - avoid/minimize use of NSAIDs- since they can cause GI upset, bleeding and/or ulcers. If NSAID must be taken, recommend take with food.     History of gastroesophageal reflux (GERD)  -  START   famotidine (PEPCID) 40  "MG tablet; Take 1 tablet (40 mg total) by mouth every evening.  Dispense: 30 tablet; Refill: 3  - CONTINUE PROTONIX 40 MG ONCE DAILY AS DIRECTED  - schedule EGD, discussed procedure with patient, including risks and benefits, patient verbalized understanding     Gastroparesis  - schedule EGD, discussed procedure with patient, including risks and benefits, patient verbalized understanding  - CONTINUE 6 small frequent meals instead of 3 big meals a day, low fat meals & low residue diet.  - Avoid: high fiber (insoluble fiber), red meats, dairy, and non-digestible solids (peels, fruit pulp, etc). Avoid NSAIDs and narcotics.  - possible medical therapy and discussed that this would be managed by one of the GI doctors, lastly possible referral to general surgery for surgical options, patient verbalized understanding     Nausea  - schedule EGD, discussed procedure with patient, including risks and benefits, patient verbalized understanding  - CONTINUE ZOFRAN PRN AS DIRECTED FOR NAUSEA     Weight loss  - schedule EGD, discussed procedure with patient, including risks and benefits, patient verbalized understanding  - encouraged PO intake and daily calorie counts to ensure adequate nutrition is taken in, recommend at least 2,000 calories a day  - recommend nutritional drinks, such as Boost, Ensure or Glucerna, to supplement nutrition needs     Pill dysphagia  - schedule EGD, discussed procedure with patient and possible esophageal dilation may be performed during procedure if indicated, patient verbalized understanding  - recommend to eat smaller more frequent meals and to eat slowly and advised to eat a soft diet. Take medications one at a time with a full glass of water.  - possible UGI/esophagram/esophageal manometry if symptoms persist                  UPPER GASTROINTESTINAL ENDOSCOPY   12/2023     done at [facility]      COLONOSCOPY   12/2023     done in [place]; repots part of her colon resection "opened " "up"           PTA Medications   Medication Sig    cetirizine (ZYRTEC) 10 MG tablet Take 10 mg by mouth daily as needed.    cyclobenzaprine (FLEXERIL) 10 MG tablet Take 1 tablet (10 mg total) by mouth 3 (three) times daily as needed for Muscle spasms.    docusate sodium (COLACE) 100 MG capsule Take 1 capsule (100 mg total) by mouth 2 (two) times daily.    famotidine (PEPCID) 40 MG tablet Take 1 tablet (40 mg total) by mouth every evening.    HYDROcodone-acetaminophen (NORCO)  mg per tablet Take 1 tablet by mouth every 8 (eight) hours as needed for Pain.    lisinopriL (PRINIVIL,ZESTRIL) 5 MG tablet Take 1 tablet (5 mg total) by mouth once daily.    lisinopriL-hydrochlorothiazide (PRINZIDE,ZESTORETIC) 20-12.5 mg per tablet Take 1 tablet by mouth once daily.    metFORMIN (GLUCOPHAGE-XR) 500 MG ER 24hr tablet Take 1 tablet (500 mg total) by mouth daily with breakfast.    multivitamin (THERAGRAN) per tablet Take 1 tablet by mouth.    pantoprazole (PROTONIX) 40 MG tablet Take 1 tablet (40 mg total) by mouth once daily.    paroxetine (PAXIL) 20 MG tablet Take 1 tablet (20 mg total) by mouth every evening.    polyethylene glycol (GLYCOLAX) 17 gram PwPk Take 17 g by mouth once daily. (Patient taking differently: Take 17 g by mouth 2 (two) times a day.)    pravastatin (PRAVACHOL) 40 MG tablet Take 40 mg by mouth.    pregabalin (LYRICA) 25 MG capsule TAKE ONE CAPSULE BY MOUTH THREE TIMES DAILY    secukinumab (COSENTYX PEN) 150 mg/mL PnIj Inject 150 mg into the skin every 28 days.    traZODone (DESYREL) 100 MG tablet Take 1 tablet (100 mg total) by mouth every evening.    zinc gluconate 50 mg tablet Take 50 mg by mouth.    aloe vera 5,000 mg Cap Take by mouth.    cycloSPORINE (RESTASIS) 0.05 % ophthalmic emulsion     guaiFENesin (MUCINEX) 600 mg 12 hr tablet Take 1,200 mg by mouth.    [facility] ([facility]) Misc Glucometer and strips#100    ondansetron (ZOFRAN-ODT) 4 MG TbDL Take 1 tablet (4 mg total) by " "mouth every 8 (eight) hours as needed (nausea).    triamcinolone acetonide 0.025% (KENALOG) 0.025 % cream APPLY TO THE AFFECTED AREA TOPICALLY twice daily FOR 7 DAYS    triamcinolone acetonide 0.1% (KENALOG) 0.1 % ointment Apply topically 2 (two) times daily.    valACYclovir (VALTREX) 1000 MG tablet Take 1 tablet (1,000 mg total) by mouth 3 (three) times daily.       Review of patient's allergies indicates:   Allergen Reactions    Glucotrol [glipizide] Hallucinations     Low glucose    Cymbalta [duloxetine] Other (See Comments)     Altered mental status        Past Medical History:   Diagnosis Date    Acid reflux     Diabetes mellitus     Dry mouth     Gastroparesis     Hypertension      Past Surgical History:   Procedure Laterality Date    ADENOIDECTOMY      APPENDECTOMY       SECTION      CHOLECYSTECTOMY      COLON SURGERY      x 2; first was in  Dr. Conner due to diverticulitis; second one done by Dr. Thomas    COLONOSCOPY  2023    done in [place]; repots part of her colon resection "opened up"    HYSTERECTOMY      LASIK      TONSILLECTOMY      UPPER GASTROINTESTINAL ENDOSCOPY  2023    done at [facility]     Family History   Problem Relation Name Age of Onset    Lupus Mother      Osteoarthritis Mother      Heart disease Mother      Heart disease Father      Cancer Father      Lupus Sister      Colon cancer Neg Hx      Crohn's disease Neg Hx      Ulcerative colitis Neg Hx      Stomach cancer Neg Hx      Esophageal cancer Neg Hx       Social History     Tobacco Use    Smoking status: Every Day     Types: Vaping with nicotine     Last attempt to quit: 2012     Years since quittin.9    Smokeless tobacco: Never   Substance Use Topics    Alcohol use: Not Currently     Comment: sober    Drug use: Never         OBJECTIVE:     Vital Signs (Most Recent)  Temp: 98.1 °F (36.7 °C) (24 1116)  Pulse: 77 (24 1132)  Resp: 16 (24 1132)  BP: (!) 140/76 (24 " "1132)  SpO2: 96 % (11/14/24 1132)    Physical Exam:  : Ht: 4' 11" (149.9 cm)   Wt: 49.4 kg   BMI: 22.02 kg/m² .                                                       GENERAL:  Comfortable, in no acute distress.                                 HEENT EXAM:  Nonicteric.  No adenopathy.  Oropharynx is clear.               NECK:  Supple.                                                               LUNGS:  Clear.                                                               CARDIAC:  Regular rate and rhythm.  S1, S2.  No murmur.                      ABDOMEN:  Soft, positive bowel sounds, nontender.  No hepatosplenomegaly or masses.  No rebound or guarding.                                             EXTREMITIES:  No edema.     MENTAL STATUS:  Alert and oriented.    ASSESSMENT/PLAN:     Assessment: Abdominal pain and nausea and weight loss.  Dysphagia.    Plan: Gastroscopy    Anesthesia Plan:   MAC / General Anaesthesia    ASA Grade: ASA 2 - Patient with mild systemic disease with no functional limitations    MALLAMPATI SCORE: I (soft palate, uvula, fauces, and tonsillar pillars visible)    "

## 2024-11-14 NOTE — TRANSFER OF CARE
"Anesthesia Transfer of Care Note    Patient: Malu Holland    Procedure(s) Performed: Procedure(s) (LRB):  EGD (ESOPHAGOGASTRODUODENOSCOPY) (N/A)    Patient location: PACU    Anesthesia Type: general    Transport from OR: Transported from OR on room air with adequate spontaneous ventilation    Post pain: adequate analgesia    Post assessment: tolerated procedure well and no apparent anesthetic complications    Post vital signs: stable    Level of consciousness: responds to stimulation    Nausea/Vomiting: no nausea/vomiting    Complications: none    Transfer of care protocol was followed      Last vitals: Visit Vitals  BP (!) 140/76 (BP Location: Left arm, Patient Position: Lying)   Pulse 77   Temp 36.7 °C (98.1 °F) (Skin)   Resp 16   Ht 4' 11" (1.499 m)   Wt 47.2 kg (104 lb)   SpO2 96%   Breastfeeding No   BMI 21.01 kg/m²     "

## 2024-11-14 NOTE — PROVATION PATIENT INSTRUCTIONS
Discharge Summary/Instructions after an Endoscopic Procedure  Patient Name: Malu Holland  Patient MRN: [id number]  Patient YOB: 1960  Thursday, November 14, 2024  Oswald Paniagua MD  Dear patient,  As a result of recent federal legislation (The Federal Cures Act), you may   receive lab or pathology results from your procedure in your [facility]   account before your physician is able to contact you. Your physician or   their representative will relay the results to you with their   recommendations at their soonest availability.  Thank you,  RESTRICTIONS:  During your procedure today, you received medications for sedation.  These   medications may affect your judgment, balance and coordination.  Therefore,   for 24 hours, you have the following restrictions:   - DO NOT drive a car, operate machinery, make legal/financial decisions,   sign important papers or drink alcohol.    ACTIVITY:  Today: no heavy lifting, straining or running due to procedural   sedation/anesthesia.  The following day: return to full activity including work.  DIET:  Eat and drink normally unless instructed otherwise.     TREATMENT FOR COMMON SIDE EFFECTS:  - Mild abdominal pain, nausea, belching, bloating or excessive gas:  rest,   eat lightly and use a heating pad.  - Sore Throat: treat with throat lozenges and/or gargle with warm salt   water.  - Because air was used during the procedure, expelling large amounts of air   from your rectum or belching is normal.  - If a bowel prep was taken, you may not have a bowel movement for 1-3 days.    This is normal.  SYMPTOMS TO WATCH FOR AND REPORT TO YOUR PHYSICIAN:  1. Abdominal pain or bloating, other than gas cramps.  2. Chest pain.  3. Back pain.  4. Signs of infection such as: chills or fever occurring within 24 hours   after the procedure.  5. Rectal bleeding, which would show as bright red, maroon, or black stools.   (A tablespoon of blood from the rectum is not serious,  especially if   hemorrhoids are present.)  6. Vomiting.  7. Weakness or dizziness.  GO DIRECTLY TO THE NEAREST EMERGENCY ROOM IF YOU HAVE ANY OF THE FOLLOWING:      Difficulty breathing              Chills and/or fever over 101 F   Persistent vomiting and/or vomiting blood   Severe abdominal pain   Severe chest pain   Black, tarry stools   Bleeding- more than one tablespoon   Any other symptom or condition that you feel may need urgent attention  Your doctor recommends these additional instructions:  If any biopsies were taken, your doctors clinic will contact you in 1 to 2   weeks with any results.  Follow an antireflux regimen.  This includes:       - Do not lie down for at least 3 to 4 hours after meals.        - Raise the head of the bed 4 to 6 inches.        - Decrease excess weight.        - Avoid citrus juices and other acidic foods, alcohol, chocolate, mints,   coffee and other caffeinated beverages, carbonated beverages, fatty and   fried foods.        - Avoid tight-fitting clothing.        - Avoid cigarettes and other tobacco products.   Eat a gastroparesis diet (small frequent meals, low fat, low fiber, chew   thoroughly, drink liquids during meals).   Continue your present medications.   Take Protonix (pantoprazole) 40 mg by mouth every morning.   Take Pepcid (famotidine) 40 mg by mouth every night.   Return to your GI clinic in 2-3 months.  For questions, problems or results please call your physician - Oswald Paniagua MD at Work:  [phone].  EMERGENCY PHONE NUMBER: [phone], LAB RESULTS: [phone]  IF A COMPLICATION OR EMERGENCY SITUATION ARISES AND YOU ARE UNABLE TO REACH   YOUR PHYSICIAN - GO DIRECTLY TO THE EMERGENCY ROOM.  ___________________________________________  Nurse Signature  ___________________________________________  Patient/Designated Responsible Party Signature  Oswald Paniagua MD  11/14/2024 12:34:32 PM  This report has been verified and signed  electronically.  Dear patient,  As a result of recent federal legislation (The Federal Cures Act), you may   receive lab or pathology results from your procedure in your [facility]   account before your physician is able to contact you. Your physician or   their representative will relay the results to you with their   recommendations at their soonest availability.  Thank you.  PROVATION

## 2024-11-14 NOTE — ANESTHESIA POSTPROCEDURE EVALUATION
Anesthesia Post Evaluation    Patient: Malu Holland    Procedure(s) Performed: Procedure(s) (LRB):  EGD (ESOPHAGOGASTRODUODENOSCOPY) (N/A)    Final Anesthesia Type: general      Patient location during evaluation: PACU  Patient participation: Yes- Able to Participate  Level of consciousness: awake and alert  Post-procedure vital signs: reviewed and stable  Pain management: adequate  Airway patency: patent    PONV status at discharge: No PONV  Anesthetic complications: no      Cardiovascular status: blood pressure returned to baseline  Respiratory status: unassisted and room air  Hydration status: euvolemic  Follow-up not needed.              Vitals Value Taken Time   /74 11/14/24 1245   Temp  11/14/24 1342   Pulse 72 11/14/24 1245   Resp 18 11/14/24 1245   SpO2 97 % 11/14/24 1245         Event Time   Out of Recovery 12:52:00         Pain/Tadeo Score: Tadeo Score: 10 (11/14/2024 12:47 PM)

## 2024-11-14 NOTE — BRIEF OP NOTE
Discharge Note  Short Stay      SUMMARY     Admit Date: 11/14/2024    Attending Physician: Oswald Paniagua Jr., MD     Discharge Physician: Oswald Paniagua Jr., MD    Discharge Date: 11/14/2024 12:37 PM    Final Diagnosis: Heartburn [R12]  Upper abdominal pain [R10.10]  Weight loss [R63.4]  Gastroparesis [K31.84]  Pill dysphagia [R13.10]      Impression:            - Normal oropharynx.                          - Normal cricopharyngeus.                          - Normal esophagus. Biopsied.                          - Z-line regular, 36 cm from the incisors.                          - Normal cardia.                          - Normal gastric fundus and gastric body.                          - Gastritis.                          - White nummular lesions in gastric mucosa.                          Biopsied.                          - Normal pylorus.                          - Normal examined duodenum.                          - Normal major papilla.                          - No endoscopic esophageal abnormality to explain                          patient's dysphagia. Esophagus dilated, 51 Fr.   Recommendation:        - Discharge patient to home.                          - Observe patient's clinical course following                          today's procedure with therapeutic intervention.                          - Await pathology results.                          - Follow an antireflux regimen.                          - Gastroparesis diet.                          - Await pathology results.                          - Continue present medications.                          - Use Protonix (pantoprazole) 40 mg PO daily.                          - Use Pepcid (famotidine) 40 mg PO nightly.                          - Return to GI clinic in 2-3 months.   Oswald Paniagua MD   11/14/2024       Disposition: HOME OR SELF CARE    Patient Instructions:   Current Discharge Medication List        CONTINUE these medications  which have NOT CHANGED    Details   cetirizine (ZYRTEC) 10 MG tablet Take 10 mg by mouth daily as needed.      cyclobenzaprine (FLEXERIL) 10 MG tablet Take 1 tablet (10 mg total) by mouth 3 (three) times daily as needed for Muscle spasms.  Qty: 90 tablet, Refills: 5    Associated Diagnoses: Ankylosing spondylitis, unspecified site of spine      docusate sodium (COLACE) 100 MG capsule Take 1 capsule (100 mg total) by mouth 2 (two) times daily.  Qty: 180 capsule, Refills: 3    Associated Diagnoses: Chronic constipation      famotidine (PEPCID) 40 MG tablet Take 1 tablet (40 mg total) by mouth every evening.  Qty: 30 tablet, Refills: 3    Associated Diagnoses: Upper abdominal pain      HYDROcodone-acetaminophen (NORCO)  mg per tablet Take 1 tablet by mouth every 8 (eight) hours as needed for Pain.  Qty: 90 tablet, Refills: 0    Comments: Quantity prescribed more than 7 day supply? Yes, quantity medically necessary  Associated Diagnoses: Ankylosing spondylitis, unspecified site of spine; Chronic pain syndrome; Sjogren's syndrome, with unspecified organ involvement; Leukopenia, unspecified type; Cervical spinal stenosis; Anxiety; Hashimoto's thyroiditis; Spinal stenosis of lumbar region with neurogenic claudication; MCTD (mixed connective tissue disease)      lisinopriL (PRINIVIL,ZESTRIL) 5 MG tablet Take 1 tablet (5 mg total) by mouth once daily.  Qty: 90 tablet, Refills: 3    Comments: .  Associated Diagnoses: Hypertension, unspecified type      lisinopriL-hydrochlorothiazide (PRINZIDE,ZESTORETIC) 20-12.5 mg per tablet Take 1 tablet by mouth once daily.      metFORMIN (GLUCOPHAGE-XR) 500 MG ER 24hr tablet Take 1 tablet (500 mg total) by mouth daily with breakfast.  Qty: 90 tablet, Refills: 3    Associated Diagnoses: Type 2 diabetes mellitus with other specified complication, unspecified whether long term insulin use      multivitamin (THERAGRAN) per tablet Take 1 tablet by mouth.      pantoprazole (PROTONIX) 40  MG tablet Take 1 tablet (40 mg total) by mouth once daily.  Qty: 30 tablet, Refills: 5    Comments: This prescription was filled on 6/30/2022. Any refills authorized will be placed on file.  Associated Diagnoses: GERD without esophagitis      paroxetine (PAXIL) 20 MG tablet Take 1 tablet (20 mg total) by mouth every evening.  Qty: 90 tablet, Refills: 3    Associated Diagnoses: Chronic pain syndrome; Anxiety      polyethylene glycol (GLYCOLAX) 17 gram PwPk Take 17 g by mouth once daily.  Qty: 100 each, Refills: 3    Associated Diagnoses: Constipation, unspecified constipation type      pravastatin (PRAVACHOL) 40 MG tablet Take 40 mg by mouth.      pregabalin (LYRICA) 25 MG capsule TAKE ONE CAPSULE BY MOUTH THREE TIMES DAILY  Qty: 90 capsule, Refills: 3    Comments: This prescription was filled on 8/22/2024. Any refills authorized will be placed on file.  Associated Diagnoses: Ankylosing spondylitis, unspecified site of spine; Chronic pain syndrome; GERD without esophagitis; Sjogren's syndrome, with unspecified organ involvement; Leukopenia, unspecified type; Cervical spinal stenosis; Anxiety; Cervical arthritis with myelopathy; Hashimoto's thyroiditis; Spinal stenosis of lumbar region with neurogenic claudication; MCTD (mixed connective tissue disease); Thrush of mouth and esophagus; BRENNON positive; Acute ITP      secukinumab (COSENTYX PEN) 150 mg/mL PnIj Inject 150 mg into the skin every 28 days.  Qty: 1 mL, Refills: 11    Associated Diagnoses: Ankylosing spondylitis, unspecified site of spine      traZODone (DESYREL) 100 MG tablet Take 1 tablet (100 mg total) by mouth every evening.  Qty: 90 tablet, Refills: 3    Associated Diagnoses: Ankylosing spondylitis, unspecified site of spine; Chronic pain syndrome; Sjogren's syndrome, with unspecified organ involvement; Leukopenia, unspecified type; Cervical spinal stenosis; Anxiety; Hashimoto's thyroiditis; Spinal stenosis of lumbar region with neurogenic claudication; MCTD  (mixed connective tissue disease)      zinc gluconate 50 mg tablet Take 50 mg by mouth.      aloe vera 5,000 mg Cap Take by mouth.      cycloSPORINE (RESTASIS) 0.05 % ophthalmic emulsion       guaiFENesin (MUCINEX) 600 mg 12 hr tablet Take 1,200 mg by mouth.      miscellaneous medical supply (C-TUB) Misc Glucometer and strips#100      ondansetron (ZOFRAN-ODT) 4 MG TbDL Take 1 tablet (4 mg total) by mouth every 8 (eight) hours as needed (nausea).  Qty: 15 tablet, Refills: 0    Associated Diagnoses: Nausea      triamcinolone acetonide 0.025% (KENALOG) 0.025 % cream APPLY TO THE AFFECTED AREA TOPICALLY twice daily FOR 7 DAYS      triamcinolone acetonide 0.1% (KENALOG) 0.1 % ointment Apply topically 2 (two) times daily.  Qty: 30 g, Refills: 3    Associated Diagnoses: BRENNON positive; Cervical spinal stenosis; Spinal stenosis of lumbar region with neurogenic claudication; Ataxia; Inflammatory neuropathy; Mouth sore      valACYclovir (VALTREX) 1000 MG tablet Take 1 tablet (1,000 mg total) by mouth 3 (three) times daily.  Qty: 42 tablet, Refills: 2    Associated Diagnoses: Herpes zoster with other complication             Discharge Procedure Orders (must include Diet, Follow-up, Activity)    Follow Up:  Follow up with PCP as per your routine.  Please follow an anti reflux diet and a gastroparesis diet (Small frequent meals).  Activity as tolerated.    No driving day of procedure.

## 2024-11-14 NOTE — ANESTHESIA PREPROCEDURE EVALUATION
11/14/2024  Malu Holland is a 64 y.o., female.      Pre-op Assessment          Review of Systems  Cardiovascular:     Hypertension                                    Hypertension         Hepatic/GI:     GERD         Gerd          Endocrine:  Diabetes    Diabetes                          Physical Exam  General: Well nourished        Anesthesia Plan  Type of Anesthesia, risks & benefits discussed:    Anesthesia Type: Gen Natural Airway  Intra-op Monitoring Plan: Standard ASA Monitors  Induction:  IV  Informed Consent: Informed consent signed with the Patient and all parties understand the risks and agree with anesthesia plan.  All questions answered.   ASA Score: 2  Day of Surgery Review of History & Physical: H&P Update referred to the surgeon/provider.    Ready For Surgery From Anesthesia Perspective.     .

## 2024-11-19 LAB
FINAL PATHOLOGIC DIAGNOSIS: NORMAL
GROSS: NORMAL
Lab: NORMAL

## 2024-11-20 ENCOUNTER — TELEPHONE (OUTPATIENT)
Dept: GASTROENTEROLOGY | Facility: CLINIC | Age: 64
End: 2024-11-20
Payer: MEDICARE

## 2024-11-20 NOTE — TELEPHONE ENCOUNTER
Attempted to reach pt regarding f/u per Dr. Paniagua in 2-3 months with NP Danelle. Left  call back number provided.

## 2024-12-02 ENCOUNTER — LAB VISIT (OUTPATIENT)
Dept: LAB | Facility: HOSPITAL | Age: 64
End: 2024-12-02
Payer: MEDICARE

## 2024-12-02 DIAGNOSIS — M45.9 ANKYLOSING SPONDYLITIS, UNSPECIFIED SITE OF SPINE: ICD-10-CM

## 2024-12-02 DIAGNOSIS — Z79.899 IMMUNOCOMPROMISED STATE DUE TO DRUG THERAPY: ICD-10-CM

## 2024-12-02 DIAGNOSIS — D84.821 IMMUNOCOMPROMISED STATE DUE TO DRUG THERAPY: ICD-10-CM

## 2024-12-02 LAB
ALBUMIN SERPL BCP-MCNC: 4.2 G/DL (ref 3.5–5.2)
ALP SERPL-CCNC: 62 U/L (ref 40–150)
ALT SERPL W/O P-5'-P-CCNC: 135 U/L (ref 10–44)
ANION GAP SERPL CALC-SCNC: 15 MMOL/L (ref 8–16)
AST SERPL-CCNC: 70 U/L (ref 10–40)
BASOPHILS # BLD AUTO: 0.02 K/UL (ref 0–0.2)
BASOPHILS NFR BLD: 0.4 % (ref 0–1.9)
BILIRUB SERPL-MCNC: 0.3 MG/DL (ref 0.1–1)
BUN SERPL-MCNC: 19 MG/DL (ref 8–23)
CALCIUM SERPL-MCNC: 10.5 MG/DL (ref 8.7–10.5)
CHLORIDE SERPL-SCNC: 100 MMOL/L (ref 95–110)
CO2 SERPL-SCNC: 25 MMOL/L (ref 23–29)
CREAT SERPL-MCNC: 0.7 MG/DL (ref 0.5–1.4)
CRP SERPL-MCNC: <0.3 MG/L (ref 0–8.2)
DIFFERENTIAL METHOD BLD: ABNORMAL
EOSINOPHIL # BLD AUTO: 0.1 K/UL (ref 0–0.5)
EOSINOPHIL NFR BLD: 2.8 % (ref 0–8)
ERYTHROCYTE [DISTWIDTH] IN BLOOD BY AUTOMATED COUNT: 15.5 % (ref 11.5–14.5)
ERYTHROCYTE [SEDIMENTATION RATE] IN BLOOD BY WESTERGREN METHOD: 15 MM/HR (ref 0–20)
EST. GFR  (NO RACE VARIABLE): >60 ML/MIN/1.73 M^2
GLUCOSE SERPL-MCNC: 118 MG/DL (ref 70–110)
HCT VFR BLD AUTO: 44.6 % (ref 37–48.5)
HGB BLD-MCNC: 14.6 G/DL (ref 12–16)
IMM GRANULOCYTES # BLD AUTO: 0.01 K/UL (ref 0–0.04)
IMM GRANULOCYTES NFR BLD AUTO: 0.2 % (ref 0–0.5)
LYMPHOCYTES # BLD AUTO: 1.3 K/UL (ref 1–4.8)
LYMPHOCYTES NFR BLD: 26.9 % (ref 18–48)
MCH RBC QN AUTO: 30.2 PG (ref 27–31)
MCHC RBC AUTO-ENTMCNC: 32.7 G/DL (ref 32–36)
MCV RBC AUTO: 92 FL (ref 82–98)
MONOCYTES # BLD AUTO: 0.4 K/UL (ref 0.3–1)
MONOCYTES NFR BLD: 7.9 % (ref 4–15)
NEUTROPHILS # BLD AUTO: 2.9 K/UL (ref 1.8–7.7)
NEUTROPHILS NFR BLD: 61.8 % (ref 38–73)
NRBC BLD-RTO: 0 /100 WBC
PLATELET # BLD AUTO: 185 K/UL (ref 150–450)
PMV BLD AUTO: 10.6 FL (ref 9.2–12.9)
POTASSIUM SERPL-SCNC: 4 MMOL/L (ref 3.5–5.1)
PROT SERPL-MCNC: 7.9 G/DL (ref 6–8.4)
RBC # BLD AUTO: 4.84 M/UL (ref 4–5.4)
SODIUM SERPL-SCNC: 140 MMOL/L (ref 136–145)
WBC # BLD AUTO: 4.68 K/UL (ref 3.9–12.7)

## 2024-12-02 PROCEDURE — 36415 COLL VENOUS BLD VENIPUNCTURE: CPT | Mod: PO | Performed by: PHYSICIAN ASSISTANT

## 2024-12-02 PROCEDURE — 85025 COMPLETE CBC W/AUTO DIFF WBC: CPT | Performed by: PHYSICIAN ASSISTANT

## 2024-12-02 PROCEDURE — 85651 RBC SED RATE NONAUTOMATED: CPT | Mod: PO | Performed by: PHYSICIAN ASSISTANT

## 2024-12-02 PROCEDURE — 86140 C-REACTIVE PROTEIN: CPT | Performed by: PHYSICIAN ASSISTANT

## 2024-12-02 PROCEDURE — 86480 TB TEST CELL IMMUN MEASURE: CPT | Performed by: PHYSICIAN ASSISTANT

## 2024-12-02 PROCEDURE — 80053 COMPREHEN METABOLIC PANEL: CPT | Performed by: PHYSICIAN ASSISTANT

## 2024-12-03 LAB
GAMMA INTERFERON BACKGROUND BLD IA-ACNC: 0.01 IU/ML
M TB IFN-G CD4+ BCKGRND COR BLD-ACNC: 0.01 IU/ML
M TB IFN-G CD4+ BCKGRND COR BLD-ACNC: 0.01 IU/ML
MITOGEN IGNF BCKGRD COR BLD-ACNC: 9.99 IU/ML
TB GOLD PLUS: NEGATIVE

## 2024-12-11 ENCOUNTER — TELEPHONE (OUTPATIENT)
Dept: GASTROENTEROLOGY | Facility: CLINIC | Age: 64
End: 2024-12-11
Payer: MEDICARE

## 2024-12-11 NOTE — TELEPHONE ENCOUNTER
----- Message from Oswald Paniagua MD sent at 12/8/2024  1:39 AM CST -----  Please let patient know.  The stomach biopsies came out fine.   And no evidence of the bacteria.        Rec remains the same.  Take the meds as directed.

## 2024-12-11 NOTE — TELEPHONE ENCOUNTER
Results message sent to patient portal    Per Oswald Oconnell Jr., MD [facility] Staff  Please let patient know.  The stomach biopsies came out fine.   And no evidence of the bacteria.        Rec remains the same.  Take the meds as directed.

## 2024-12-13 ENCOUNTER — OFFICE VISIT (OUTPATIENT)
Dept: RHEUMATOLOGY | Facility: CLINIC | Age: 64
End: 2024-12-13
Payer: MEDICARE

## 2024-12-13 VITALS
SYSTOLIC BLOOD PRESSURE: 131 MMHG | HEIGHT: 59 IN | WEIGHT: 110.25 LBS | DIASTOLIC BLOOD PRESSURE: 71 MMHG | BODY MASS INDEX: 22.23 KG/M2 | HEART RATE: 72 BPM

## 2024-12-13 DIAGNOSIS — M35.1 MCTD (MIXED CONNECTIVE TISSUE DISEASE): ICD-10-CM

## 2024-12-13 DIAGNOSIS — E06.3 HASHIMOTO'S THYROIDITIS: ICD-10-CM

## 2024-12-13 DIAGNOSIS — R79.89 LFTS ABNORMAL: ICD-10-CM

## 2024-12-13 DIAGNOSIS — M45.9 ANKYLOSING SPONDYLITIS, UNSPECIFIED SITE OF SPINE: Primary | ICD-10-CM

## 2024-12-13 DIAGNOSIS — G89.4 CHRONIC PAIN SYNDROME: ICD-10-CM

## 2024-12-13 DIAGNOSIS — L40.9 SCALP PSORIASIS: ICD-10-CM

## 2024-12-13 DIAGNOSIS — R74.01 ELEVATION OF LEVELS OF LIVER TRANSAMINASE LEVELS: ICD-10-CM

## 2024-12-13 PROCEDURE — 3008F BODY MASS INDEX DOCD: CPT | Mod: CPTII,S$GLB,, | Performed by: INTERNAL MEDICINE

## 2024-12-13 PROCEDURE — 99215 OFFICE O/P EST HI 40 MIN: CPT | Mod: 25,S$GLB,, | Performed by: INTERNAL MEDICINE

## 2024-12-13 PROCEDURE — 1101F PT FALLS ASSESS-DOCD LE1/YR: CPT | Mod: CPTII,S$GLB,, | Performed by: INTERNAL MEDICINE

## 2024-12-13 PROCEDURE — 3044F HG A1C LEVEL LT 7.0%: CPT | Mod: CPTII,S$GLB,, | Performed by: INTERNAL MEDICINE

## 2024-12-13 PROCEDURE — 99999 PR PBB SHADOW E&M-EST. PATIENT-LVL V: CPT | Mod: PBBFAC,,, | Performed by: INTERNAL MEDICINE

## 2024-12-13 PROCEDURE — 3288F FALL RISK ASSESSMENT DOCD: CPT | Mod: CPTII,S$GLB,, | Performed by: INTERNAL MEDICINE

## 2024-12-13 PROCEDURE — 96372 THER/PROPH/DIAG INJ SC/IM: CPT | Mod: S$GLB,,, | Performed by: INTERNAL MEDICINE

## 2024-12-13 PROCEDURE — 4010F ACE/ARB THERAPY RXD/TAKEN: CPT | Mod: CPTII,S$GLB,, | Performed by: INTERNAL MEDICINE

## 2024-12-13 PROCEDURE — 3075F SYST BP GE 130 - 139MM HG: CPT | Mod: CPTII,S$GLB,, | Performed by: INTERNAL MEDICINE

## 2024-12-13 PROCEDURE — 3078F DIAST BP <80 MM HG: CPT | Mod: CPTII,S$GLB,, | Performed by: INTERNAL MEDICINE

## 2024-12-13 PROCEDURE — 1159F MED LIST DOCD IN RCRD: CPT | Mod: CPTII,S$GLB,, | Performed by: INTERNAL MEDICINE

## 2024-12-13 RX ORDER — KETOROLAC TROMETHAMINE 30 MG/ML
60 INJECTION, SOLUTION INTRAMUSCULAR; INTRAVENOUS
Status: COMPLETED | OUTPATIENT
Start: 2024-12-13 | End: 2024-12-13

## 2024-12-13 RX ORDER — CLOBETASOL PROPIONATE 0.5 MG/ML
SOLUTION TOPICAL 2 TIMES DAILY
Qty: 150 ML | Refills: 3 | Status: ACTIVE | OUTPATIENT
Start: 2024-12-13 | End: 2025-06-11

## 2024-12-13 RX ORDER — SECUKINUMAB 150 MG/ML
300 INJECTION SUBCUTANEOUS
Qty: 2 ML | Refills: 11 | Status: ACTIVE | OUTPATIENT
Start: 2024-12-13

## 2024-12-13 RX ORDER — HYDROCODONE BITARTRATE AND ACETAMINOPHEN 10; 325 MG/1; MG/1
1 TABLET ORAL EVERY 8 HOURS PRN
Qty: 90 TABLET | Refills: 0 | Status: SHIPPED | OUTPATIENT
Start: 2025-01-13 | End: 2025-02-12

## 2024-12-13 RX ORDER — HYDROCODONE BITARTRATE AND ACETAMINOPHEN 10; 325 MG/1; MG/1
1 TABLET ORAL EVERY 8 HOURS PRN
Qty: 90 TABLET | Refills: 0 | Status: SHIPPED | OUTPATIENT
Start: 2024-12-13 | End: 2025-01-12

## 2024-12-13 RX ORDER — CYANOCOBALAMIN 1000 UG/ML
1000 INJECTION, SOLUTION INTRAMUSCULAR; SUBCUTANEOUS
Status: COMPLETED | OUTPATIENT
Start: 2024-12-13 | End: 2024-12-13

## 2024-12-13 RX ADMIN — CYANOCOBALAMIN 1000 MCG: 1000 INJECTION, SOLUTION INTRAMUSCULAR; SUBCUTANEOUS at 03:12

## 2024-12-13 RX ADMIN — KETOROLAC TROMETHAMINE 60 MG: 30 INJECTION, SOLUTION INTRAMUSCULAR; INTRAVENOUS at 03:12

## 2024-12-13 NOTE — PROGRESS NOTES
Subjective:     Patient ID:  Malu Holland    Chief Complaint:  Disease Management     History of Present Illness:   Mrs. Holland is a 64 year old female who presents to clinic for follow up on ankylosing spondylitis and sjogren's syndrome. Breast discharge neg cancer.  She is doing poorly due to increased pain in her neck and low back. Previous imaging studies  showed multilevel cervical spondylosis and severe neural foraminal narrowing at C5-6 and mild L4-5 central spinal stenosis. She reports compliance with cosentyx monthly and she does feel this helps with inflammation.      She complains of abdominal fullness after eating certain foods. Labs recently w/PCP showed elevated LFTs and repeat testing AST/ALT higher at 66/106 respectively. Abdominal US ordered. She complains of nausea as well. She has gastropheresis and is on pravastatin     She is also undergoing evaluation for persistent vaginal bleeding--pelvic US ordered.      She has area of hyperpigmentation on her upper back that is pruritic.            - Diagnosis/es:  - Positive serologies:  - Infectious screening labs:  - Previous Treatments:  - Current Treatments:     Interval History:   Hospitalization since last office visit: No    Patient Active Problem List    Diagnosis Date Noted    Pill dysphagia 2024    Hypertension 2024    Encounter to establish care 2024    Gastroparesis 2024    Chronic pain syndrome 2024    Chronic constipation 2024    Hashimoto's thyroiditis 2024    MCTD (mixed connective tissue disease) 2024    Leukopenia 2024    Ankylosing spondylitis 2024     Past Surgical History:   Procedure Laterality Date    ADENOIDECTOMY      APPENDECTOMY       SECTION      CHOLECYSTECTOMY      COLON SURGERY      x 2; first was in  Dr. Conner due to diverticulitis; second one done by Dr. Thomas    COLONOSCOPY  2023    done in [place]; repots part of her colon resection  ""opened up"    ESOPHAGOGASTRODUODENOSCOPY N/A 2024    Procedure: EGD (ESOPHAGOGASTRODUODENOSCOPY);  Surgeon: Oswald Paniagua Jr., MD;  Location: [facility];  Service: Endoscopy;  Laterality: N/A;    HYSTERECTOMY      LASIK      OOPHORECTOMY Left     TONSILLECTOMY      UPPER GASTROINTESTINAL ENDOSCOPY  2023    done at [facility]     Social History     Tobacco Use    Smoking status: Every Day     Types: Vaping with nicotine     Last attempt to quit: 2012     Years since quittin.0    Smokeless tobacco: Never   Substance Use Topics    Alcohol use: Not Currently     Comment: sober    Drug use: Never     Family History   Problem Relation Name Age of Onset    Lupus Mother      Osteoarthritis Mother      Heart disease Mother      Heart disease Father      Cancer Father      Lupus Sister      Colon cancer Neg Hx      Crohn's disease Neg Hx      Ulcerative colitis Neg Hx      Stomach cancer Neg Hx      Esophageal cancer Neg Hx       Review of patient's allergies indicates:   Allergen Reactions    Glucotrol [glipizide] Hallucinations     Low glucose    Cymbalta [duloxetine] Other (See Comments)     Altered mental status       Review of Systems   Review of Systems     Current Medications:  Current Outpatient Medications   Medication Instructions    aloe vera 5,000 mg Cap Take by mouth.    cetirizine (ZYRTEC) 10 mg, Daily PRN    clobetasoL (TEMOVATE) 0.05 % external solution Topical (Top), 2 times daily    COSENTYX PEN (2 PENS) 300 mg, Subcutaneous, Every 28 days    cyclobenzaprine (FLEXERIL) 10 mg, Oral, 3 times daily PRN    cycloSPORINE (RESTASIS) 0.05 % ophthalmic emulsion No dose, route, or frequency recorded.    docusate sodium (COLACE) 100 mg, Oral, 2 times daily    famotidine (PEPCID) 40 mg, Oral, Nightly    guaiFENesin (MUCINEX) 1,200 mg    HYDROcodone-acetaminophen (NORCO)  mg per tablet 1 tablet, Oral, Every 8 hours PRN    lisinopriL (PRINIVIL,ZESTRIL) 5 mg, Oral, Daily    " "lisinopriL-hydrochlorothiazide (PRINZIDE,ZESTORETIC) 20-12.5 mg per tablet 1 tablet, Daily    metFORMIN (GLUCOPHAGE-XR) 500 mg, Oral, With breakfast    miscellaneous medical supply (C-TUB) Misc Glucometer and strips#100    multivitamin (THERAGRAN) per tablet 1 tablet    ondansetron (ZOFRAN-ODT) 4 mg, Oral, Every 8 hours PRN    pantoprazole (PROTONIX) 40 mg, Oral, Daily    paroxetine (PAXIL) 20 mg, Oral, Nightly    polyethylene glycol (GLYCOLAX) 17 g, Oral, Daily    pravastatin (PRAVACHOL) 40 mg    pregabalin (LYRICA) 25 mg, Oral, 3 times daily    traZODone (DESYREL) 100 mg, Oral, Nightly    triamcinolone acetonide 0.025% (KENALOG) 0.025 % cream APPLY TO THE AFFECTED AREA TOPICALLY twice daily FOR 7 DAYS    triamcinolone acetonide 0.1% (KENALOG) 0.1 % ointment Topical (Top), 2 times daily    valACYclovir (VALTREX) 1,000 mg, Oral, 3 times daily    zinc gluconate 50 mg         Objective:     Vitals:    12/13/24 1340   BP: 131/71   Pulse: 72   Weight: 50 kg (110 lb 3.7 oz)   Height: 4' 11" (1.499 m)   PainSc:   6   PainLoc: Generalized      Body mass index is 22.26 kg/m².     Physical Examinations:  Physical Exam   Constitutional: She is oriented to person, place, and time.   HENT:   Head: Normocephalic and atraumatic.   Mouth/Throat: Oropharynx is clear and moist.   Eyes: Pupils are equal, round, and reactive to light.   Neck: No thyromegaly present.   Cardiovascular: Normal rate, regular rhythm and normal heart sounds. Exam reveals no gallop and no friction rub.   No murmur heard.  Pulmonary/Chest: Breath sounds normal. She has no wheezes. She has no rales. She exhibits no tenderness.   Abdominal: There is no abdominal tenderness. There is no rebound and no guarding.   Musculoskeletal:         General: Tenderness and deformity present.      Right shoulder: Tenderness present.      Left shoulder: Tenderness present.      Right elbow: Normal.      Left elbow: Normal.      Cervical back: Neck supple.      Right knee: " Swelling and effusion present. Tenderness present.      Left knee: Effusion present.   Lymphadenopathy:     She has no cervical adenopathy.   Neurological: She is alert and oriented to person, place, and time. She has normal sensation. Gait normal.   Reflex Scores:       Patellar reflexes are 3+ on the right side and 3+ on the left side.  Skin: No rash noted. No erythema. No pallor.   Psychiatric: Mood and affect normal.   Vitals reviewed.      Right Side Rheumatological Exam     Examination finds the elbow, 1st MCP, 2nd MCP, 3rd MCP, 4th MCP and 5th MCP normal.    The patient is tender to palpation of the shoulder and knee    She has swelling of the knee    The patient has an enlarged wrist, 1st PIP, 2nd PIP, 3rd PIP, 4th PIP and 5th PIP    Shoulder Exam   Tenderness Location: acromion    Range of Motion   Active abduction:  abnormal   Adduction: abnormal  Sensation: normal    Knee Exam   Tenderness Location: medial joint line  Patellofemoral Crepitus: positive  Effusion: positive  Sensation: normal    Hip Exam   Tenderness Location: no tenderness  Sensation: normal    Elbow/Wrist Exam   Tenderness Location: no tenderness  Sensation: normal    Left Side Rheumatological Exam     Examination finds the elbow, 1st MCP, 2nd MCP, 3rd MCP, 4th MCP and 5th MCP normal.    The patient is tender to palpation of the shoulder.    The patient has an enlarged wrist, 1st PIP, 2nd PIP, 3rd PIP, 4th PIP and 5th PIP.    Shoulder Exam   Tenderness Location: acromion    Range of Motion   Active abduction:  abnormal   Sensation: normal    Knee Exam   Tenderness Location: lateral joint line and medial joint line    Patellofemoral Crepitus: positive  Effusion: positive  Sensation: normal    Hip Exam   Tenderness Location: no tenderness  Sensation: normal    Elbow/Wrist Exam   Sensation: normal      Back/Neck Exam   General Inspection   Gait: normal       Tenderness Right paramedian tenderness of the Lower C-Spine and Lower L-Spine.Left  paramedian tenderness of the Upper C-Spine and Lower L-Spine.         Disease Assessment Scores:  Patient's Global Assessment of arthritis (0-10): 2  Physician's Global Assessment of arthritis (0-10): 2  Number of Tender Joints (0-28): 3  Number of Swollen Joints (0-28): 3         No data to display                Monitoring Lab Results:  Lab Results   Component Value Date    WBC 4.68 12/02/2024    RBC 4.84 12/02/2024    HGB 14.6 12/02/2024    HCT 44.6 12/02/2024    MCV 92 12/02/2024    MCH 30.2 12/02/2024    MCHC 32.7 12/02/2024    RDW 15.5 (H) 12/02/2024     12/02/2024        Lab Results   Component Value Date     12/02/2024    K 4.0 12/02/2024     12/02/2024    CO2 25 12/02/2024     (H) 12/02/2024    BUN 19 12/02/2024    CREATININE 0.7 12/02/2024    CALCIUM 10.5 12/02/2024    PROT 7.9 12/02/2024    ALBUMIN 4.2 12/02/2024    BILITOT 0.3 12/02/2024    ALKPHOS 62 12/02/2024    AST 70 (H) 12/02/2024     (H) 12/02/2024    ANIONGAP 15 12/02/2024    EGFRNORACEVR >60.0 12/02/2024       Lab Results   Component Value Date    SEDRATE 15 12/02/2024    CRP <0.3 12/02/2024        Lab Results   Component Value Date    ABRSWEAH97YI 72 08/08/2024    GTCYDKHI23 >2000 (H) 09/25/2023        Lab Results   Component Value Date    CHOL 174 07/29/2024    HDL 60 (H) 07/29/2024    LDLCALC 98 07/29/2024    TRIG 81 07/29/2024       Lab Results   Component Value Date    RF <13.0 08/08/2024    CCPANTIBODIE 1.5 08/08/2024     Lab Results   Component Value Date    ANASCREEN Positive (A) 10/25/2024    ANATITER 1:80 10/25/2024    DSDNA Negative 1:10 10/25/2024     Lab Results   Component Value Date    HLABB27 Negative 02/08/2022       Infectious Disease Screening:  Lab Results   Component Value Date    HEPBSAG Non-reactive 10/25/2024    HEPBSAB Negative 02/08/2022    HEPBIGM Negative 02/08/2022     Lab Results   Component Value Date    HEPCAB Negative 02/08/2022     Lab Results   Component Value Date    TBGOLDPLUS  "Negative 12/02/2024     No results found for: "QUANTIFERON", "SVCMT", "QUANTAGVALUE", "QUANTNILVALU", "QUANTMITOGEN", "QFTTBAG", "QINT"     ImaCLINICAL HISTORY:  DIAGNOSIS:R10.11   RUQ pain  REASON FOR STUDY:63 yo female w/ RUQ pain and abnl lfts. thanks  ADDITIONAL HISTORY: None.  PROVIDER COMMENTS:      FINDINGS:    COMPARISON: CT 9/7/2023.    Liver measures 11.7 cm.  Contour and echotexture appear normal.    Gallbladder has been removed. Common duct measures 12 mm, unchanged.    Portal venous flow is hepatopedal.    Proximal abdominal aorta is normal caliber.    Right kidney measures 12.0 cm longitudinally. Contour and echotexture appear normal. No hydronephrosis. Corticomedullary differentiation is maintained. No nephrolithiasis identified.    Pancreas appears normal.  Exam End: 08/21/24 10:21    Specimen Collected: 08/21/24 14:11 Last Resulted: 08/21/24 14:16   Received From: [facility] [employer]  Result Received: 10/02/24 12:40    View Encounter        Received Informationging: DEXA, Xrays, MRIs, CTs, etc    Old & Outside Medical Records:  Reviewed old and all outside medical records available in Care Everywhere     Assessment:     Encounter Diagnoses   Name Primary?    Ankylosing spondylitis, unspecified site of spine Yes    Chronic pain syndrome     MCTD (mixed connective tissue disease)     Hashimoto's thyroiditis     LFTs abnormal     Scalp psoriasis     Elevation of levels of liver transaminase levels         Plan:      Encounter Diagnoses   Name Primary?    Ankylosing spondylitis, unspecified site of spine Yes    Chronic pain syndrome     MCTD (mixed connective tissue disease)     Hashimoto's thyroiditis     LFTs abnormal     Scalp psoriasis     Elevation of levels of liver transaminase levels       Malu was seen today for follow-up.    Diagnoses and all orders for this visit:    Ankylosing spondylitis, unspecified site of spine  -     Ambulatory referral/consult to Pain Clinic; Future  -     secukinumab " (COSENTYX PEN, 2 PENS,) 150 mg/mL PnIj; Inject 300 mg into the skin every 28 days.  -     COMPREHENSIVE METABOLIC PANEL; Future  -     HYDROcodone-acetaminophen (NORCO)  mg per tablet; Take 1 tablet by mouth every 8 (eight) hours as needed for Pain.  -     HYDROcodone-acetaminophen (NORCO)  mg per tablet; Take 1 tablet by mouth every 8 (eight) hours as needed for Pain.    Chronic pain syndrome  -     Ambulatory referral/consult to Pain Clinic; Future  -     secukinumab (COSENTYX PEN, 2 PENS,) 150 mg/mL PnIj; Inject 300 mg into the skin every 28 days.  -     HYDROcodone-acetaminophen (NORCO)  mg per tablet; Take 1 tablet by mouth every 8 (eight) hours as needed for Pain.  -     HYDROcodone-acetaminophen (NORCO)  mg per tablet; Take 1 tablet by mouth every 8 (eight) hours as needed for Pain.    MCTD (mixed connective tissue disease)  -     Ambulatory referral/consult to Pain Clinic; Future  -     secukinumab (COSENTYX PEN, 2 PENS,) 150 mg/mL PnIj; Inject 300 mg into the skin every 28 days.  -     BRENNON Screen w/Reflex; Future  -     Anti-Liver, Kidney, Microsome Ab; Future  -     Anti-Smooth Muscle Antibody; Future  -     Antimitochondrial Antibody; Future  -     Sedimentation rate; Future  -     C-Reactive Protein; Future  -     Gamma GT; Future  -     HYDROcodone-acetaminophen (NORCO)  mg per tablet; Take 1 tablet by mouth every 8 (eight) hours as needed for Pain.  -     HYDROcodone-acetaminophen (NORCO)  mg per tablet; Take 1 tablet by mouth every 8 (eight) hours as needed for Pain.    Hashimoto's thyroiditis  -     Ambulatory referral/consult to Pain Clinic; Future  -     secukinumab (COSENTYX PEN, 2 PENS,) 150 mg/mL PnIj; Inject 300 mg into the skin every 28 days.  -     HYDROcodone-acetaminophen (NORCO)  mg per tablet; Take 1 tablet by mouth every 8 (eight) hours as needed for Pain.  -     HYDROcodone-acetaminophen (NORCO)  mg per tablet; Take 1 tablet by mouth every  8 (eight) hours as needed for Pain.    LFTs abnormal  -     COMPREHENSIVE METABOLIC PANEL; Future  -     FibroScan (Vibration Controlled Transient Elastography); Future  -     BRENNON Screen w/Reflex; Future  -     Anti-Liver, Kidney, Microsome Ab; Future  -     Anti-Smooth Muscle Antibody; Future  -     Antimitochondrial Antibody; Future  -     Sedimentation rate; Future  -     C-Reactive Protein; Future  -     Gamma GT; Future  -     HYDROcodone-acetaminophen (NORCO)  mg per tablet; Take 1 tablet by mouth every 8 (eight) hours as needed for Pain.  -     HYDROcodone-acetaminophen (NORCO)  mg per tablet; Take 1 tablet by mouth every 8 (eight) hours as needed for Pain.    Scalp psoriasis  -     clobetasoL (TEMOVATE) 0.05 % external solution; Apply topically 2 (two) times daily.  -     HYDROcodone-acetaminophen (NORCO)  mg per tablet; Take 1 tablet by mouth every 8 (eight) hours as needed for Pain.  -     HYDROcodone-acetaminophen (NORCO)  mg per tablet; Take 1 tablet by mouth every 8 (eight) hours as needed for Pain.    Elevation of levels of liver transaminase levels  -     Gamma GT; Future  -     HYDROcodone-acetaminophen (NORCO)  mg per tablet; Take 1 tablet by mouth every 8 (eight) hours as needed for Pain.  -     HYDROcodone-acetaminophen (NORCO)  mg per tablet; Take 1 tablet by mouth every 8 (eight) hours as needed for Pain.          1. We will dc pravastatin add milk thistle and order a fibroscan to make sure she does not have cirrhosis   2. Increase cosentyx 300 mg  3. Refer to pain management  4. Will do autoimmune hepatitis     Follow-up 4 months    More than 50% of the  40 minute encounter was spent face to face counseling the patient regarding current status and future plan of care as well as side effects  of the medications. All questions were answered to patient's satisfaction also includes  non-face to face time preparing to see the patient (eg, review of tests),  Obtaining and/or reviewing separately obtained history, Documenting clinical information in the electronic or other health record, Independently interpreting results

## 2024-12-18 DIAGNOSIS — K21.9 GERD WITHOUT ESOPHAGITIS: ICD-10-CM

## 2024-12-18 NOTE — TELEPHONE ENCOUNTER
Pharmacy requesting refill on Pantoprazole 40mg  Pt's LOV 12/13/2024  Pt's NOV 05/12/2025  Medication pending

## 2024-12-19 RX ORDER — PANTOPRAZOLE SODIUM 40 MG/1
40 TABLET, DELAYED RELEASE ORAL DAILY
Qty: 30 TABLET | Refills: 2 | Status: SHIPPED | OUTPATIENT
Start: 2024-12-19

## 2025-01-29 ENCOUNTER — TELEPHONE (OUTPATIENT)
Dept: PAIN MEDICINE | Facility: CLINIC | Age: 65
End: 2025-01-29
Payer: MEDICARE

## 2025-01-29 ENCOUNTER — OFFICE VISIT (OUTPATIENT)
Dept: PAIN MEDICINE | Facility: CLINIC | Age: 65
End: 2025-01-29
Payer: MEDICARE

## 2025-01-29 VITALS — WEIGHT: 110 LBS | BODY MASS INDEX: 22.18 KG/M2 | HEIGHT: 59 IN

## 2025-01-29 DIAGNOSIS — M47.816 LUMBAR SPONDYLOSIS: ICD-10-CM

## 2025-01-29 DIAGNOSIS — M47.817 SPONDYLOSIS OF LUMBOSACRAL REGION WITHOUT MYELOPATHY OR RADICULOPATHY: Primary | ICD-10-CM

## 2025-01-29 DIAGNOSIS — G89.4 CHRONIC PAIN SYNDROME: ICD-10-CM

## 2025-01-29 DIAGNOSIS — M35.1 MCTD (MIXED CONNECTIVE TISSUE DISEASE): ICD-10-CM

## 2025-01-29 DIAGNOSIS — M45.9 ANKYLOSING SPONDYLITIS, UNSPECIFIED SITE OF SPINE: ICD-10-CM

## 2025-01-29 DIAGNOSIS — E06.3 HASHIMOTO'S THYROIDITIS: ICD-10-CM

## 2025-01-29 PROCEDURE — 1159F MED LIST DOCD IN RCRD: CPT | Mod: CPTII,S$GLB,, | Performed by: STUDENT IN AN ORGANIZED HEALTH CARE EDUCATION/TRAINING PROGRAM

## 2025-01-29 PROCEDURE — 1160F RVW MEDS BY RX/DR IN RCRD: CPT | Mod: CPTII,S$GLB,, | Performed by: STUDENT IN AN ORGANIZED HEALTH CARE EDUCATION/TRAINING PROGRAM

## 2025-01-29 PROCEDURE — 3008F BODY MASS INDEX DOCD: CPT | Mod: CPTII,S$GLB,, | Performed by: STUDENT IN AN ORGANIZED HEALTH CARE EDUCATION/TRAINING PROGRAM

## 2025-01-29 PROCEDURE — 1125F AMNT PAIN NOTED PAIN PRSNT: CPT | Mod: CPTII,S$GLB,, | Performed by: STUDENT IN AN ORGANIZED HEALTH CARE EDUCATION/TRAINING PROGRAM

## 2025-01-29 PROCEDURE — 99214 OFFICE O/P EST MOD 30 MIN: CPT | Mod: S$GLB,,, | Performed by: STUDENT IN AN ORGANIZED HEALTH CARE EDUCATION/TRAINING PROGRAM

## 2025-01-29 PROCEDURE — 1101F PT FALLS ASSESS-DOCD LE1/YR: CPT | Mod: CPTII,S$GLB,, | Performed by: STUDENT IN AN ORGANIZED HEALTH CARE EDUCATION/TRAINING PROGRAM

## 2025-01-29 PROCEDURE — 99999 PR PBB SHADOW E&M-EST. PATIENT-LVL III: CPT | Mod: PBBFAC,,, | Performed by: STUDENT IN AN ORGANIZED HEALTH CARE EDUCATION/TRAINING PROGRAM

## 2025-01-29 PROCEDURE — 3288F FALL RISK ASSESSMENT DOCD: CPT | Mod: CPTII,S$GLB,, | Performed by: STUDENT IN AN ORGANIZED HEALTH CARE EDUCATION/TRAINING PROGRAM

## 2025-01-29 PROCEDURE — 4010F ACE/ARB THERAPY RXD/TAKEN: CPT | Mod: CPTII,S$GLB,, | Performed by: STUDENT IN AN ORGANIZED HEALTH CARE EDUCATION/TRAINING PROGRAM

## 2025-01-29 RX ORDER — SODIUM CHLORIDE, SODIUM LACTATE, POTASSIUM CHLORIDE, CALCIUM CHLORIDE 600; 310; 30; 20 MG/100ML; MG/100ML; MG/100ML; MG/100ML
INJECTION, SOLUTION INTRAVENOUS CONTINUOUS
OUTPATIENT
Start: 2025-01-29

## 2025-01-29 NOTE — H&P (VIEW-ONLY)
[facility] - Department    [place], Makenna RAMIREZ MD      First Office Visit: 10/16/24  Today' Date: 1/29/2025  Last Office Visit: 10/16/24    Chief complaint: back pain    HPI: Pt is a 64 y.o., who presents for evaluation. Referred by Kiki Wall PA-C. Pt seen previously for back pain and neck pain for yrs. Of note, pt was unreachable for B MBB L4-5 and L5-S1. Pt was lost to follow-up. Returns today with the same pain as prior. Pain stays in the back and is worse with standing or sitting too long. Endorses pain in both sides of the lower back. Denies having any sharp, shooting pain going down both legs. No BB changes. Has been doing HEP.         Pain disability index score: 40  Pain score: 6    Relevant Imaging/ Testing:   MR L-spine 10/24 - DDD worse at L4-5 and L5-S1, foraminal stenosis L4-5 and L5-S1  MR C-spine 10/24 - DDD, multilevel foraminal narrowing   XR L-spine 8/24  XR C-spine 8/24    Procedures: None    Date of board of pharmacy review:1/29/2025  Date of opioid risk screening/ pain psych: None  Date of opioid agreement and consent: None  Date of urine drug screen: None  Date of random pill count: None     was reviewed today: reviewed, THC use    Prescribed medications: None    See EHR for  PMH, PSH, FH, SH, Medications and Allergy    ROS:  Positive for pain  ROS     PE:  There were no vitals filed for this visit.  General: Pleasant, no distress  HEENT: NC/ AT. PERRLA  CV: Radial pulses intact  Pulm: No distress  Ext: No edema    Physical Exam     Neuromusculoskeletal:  Head: NC, AT. PERRLA. No occipital tenderness  Neck: Intact range of motions, extension, flexion, rotation. Bilat Facet loading. Neg Spurling. Min Tenderness.  5/5 Strength, normal tone. Neg Horn's  Shoulder: Intact range of motion  Thoracic: Min tenderness, no step off  Lumbar: Intact range of motion. Bilat Facet loading. Min Tenderness. Neg SL. No pain with flexion. No pain with extension.   Hip: Intact range of motion  SI:  Level  Knee: Intact range of motion  Reflexes: normal biceps,  Knee  Strength: 5/5 globally   Sensory: Grossly intact   Skin: No bruising, erythema  Gait: Normal      Impression:  Back pain  Neck pain  Relevant History  BMI 22.46      Assessment:  Axial back pain  Lumbar spondylosis   Axial neck pain    Plan:  Discussed options  Imaging/ relevant records viewed/ reviewed/ discussed  Imaging results viewed and reviewed (noted above)/ reviewed with patient   reviewed  Cont HEP  MR C-spine and L-spine reviewed showing degenerative changes  B MBB L4-5 and L5-S1  Re-eval after  Plan for repeat MBB and RFA if appropriate  Consider B MBB C5-6 and C6-7  Spine surgery referral if needed       Prescribed medications:  1. None    The impression and plan were discussed and explained in detail. All the questions were answered. Education was provided accordingly.     The procedure was explained in detail, along with risks and potential side effects.    Follow-up:  For procedure     Aisha Salas MD

## 2025-01-29 NOTE — TELEPHONE ENCOUNTER
Types of orders made on 01/29/2025: Outpatient Referral, Procedure Request      Order Date:1/29/2025   Ordering User:CARMELA CHANDLER [[id number]]   Encounter Provider:Carmela Chandler MD [[id number]]   Authorizing Provider: Carmela Chandler MD [[id number]]   Department:[facility] PAIN MANAGEMENT[[id number]]      Common Order Information   Procedure -> Medial Branch Block (Specify level and laterality) Cmt: B MBB L4-5             and L5-S1 (Cov)      Order Specific Information   Order: Procedure Order to Pain Management [Custom: [id number]]  Order #:          [id number]: 1 FUTURE     Priority: Routine  Class: Clinic Performed     Future Order Information       Expires on:01/29/2026            Expected by:01/29/2025                   Associated Diagnoses       M47.817 Spondylosis of lumbosacral region without myelopathy or       radiculopathy       Fac[facility] Name: [facility]          Follow-up: -> 2 weeks              Priority: Routine  Class: Clinic Performed     Future Order Information       Expires on:01/29/2026            Expected by:01/29/2025                   Associated Diagnoses       M47.817 Spondylosis of lumbosacral region without myelopathy or       radiculopathy       Procedure -> Medial Branch Block (Specify level and laterality) Cmt: B MBB                 L4-5 and L5-S1 (Cov)          Facility Name: [facility]

## 2025-01-29 NOTE — PROGRESS NOTES
[facility] - Department    [place], Makenna RAMIREZ MD      First Office Visit: 10/16/24  Today' Date: 1/29/2025  Last Office Visit: 10/16/24    Chief complaint: back pain    HPI: Pt is a 64 y.o., who presents for evaluation. Referred by Kiki Wall PA-C. Pt seen previously for back pain and neck pain for yrs. Of note, pt was unreachable for B MBB L4-5 and L5-S1. Pt was lost to follow-up. Returns today with the same pain as prior. Pain stays in the back and is worse with standing or sitting too long. Endorses pain in both sides of the lower back. Denies having any sharp, shooting pain going down both legs. No BB changes. Has been doing HEP.         Pain disability index score: 40  Pain score: 6    Relevant Imaging/ Testing:   MR L-spine 10/24 - DDD worse at L4-5 and L5-S1, foraminal stenosis L4-5 and L5-S1  MR C-spine 10/24 - DDD, multilevel foraminal narrowing   XR L-spine 8/24  XR C-spine 8/24    Procedures: None    Date of board of pharmacy review:1/29/2025  Date of opioid risk screening/ pain psych: None  Date of opioid agreement and consent: None  Date of urine drug screen: None  Date of random pill count: None     was reviewed today: reviewed, THC use    Prescribed medications: None    See EHR for  PMH, PSH, FH, SH, Medications and Allergy    ROS:  Positive for pain  ROS     PE:  There were no vitals filed for this visit.  General: Pleasant, no distress  HEENT: NC/ AT. PERRLA  CV: Radial pulses intact  Pulm: No distress  Ext: No edema    Physical Exam     Neuromusculoskeletal:  Head: NC, AT. PERRLA. No occipital tenderness  Neck: Intact range of motions, extension, flexion, rotation. Bilat Facet loading. Neg Spurling. Min Tenderness.  5/5 Strength, normal tone. Neg Horn's  Shoulder: Intact range of motion  Thoracic: Min tenderness, no step off  Lumbar: Intact range of motion. Bilat Facet loading. Min Tenderness. Neg SL. No pain with flexion. No pain with extension.   Hip: Intact range of motion  SI:  Level  Knee: Intact range of motion  Reflexes: normal biceps,  Knee  Strength: 5/5 globally   Sensory: Grossly intact   Skin: No bruising, erythema  Gait: Normal      Impression:  Back pain  Neck pain  Relevant History  BMI 22.46      Assessment:  Axial back pain  Lumbar spondylosis   Axial neck pain    Plan:  Discussed options  Imaging/ relevant records viewed/ reviewed/ discussed  Imaging results viewed and reviewed (noted above)/ reviewed with patient   reviewed  Cont HEP  MR C-spine and L-spine reviewed showing degenerative changes  B MBB L4-5 and L5-S1  Re-eval after  Plan for repeat MBB and RFA if appropriate  Consider B MBB C5-6 and C6-7  Spine surgery referral if needed       Prescribed medications:  1. None    The impression and plan were discussed and explained in detail. All the questions were answered. Education was provided accordingly.     The procedure was explained in detail, along with risks and potential side effects.    Follow-up:  For procedure     Aisha Salas MD

## 2025-02-04 DIAGNOSIS — K21.9 GERD WITHOUT ESOPHAGITIS: ICD-10-CM

## 2025-02-04 DIAGNOSIS — D69.3 ACUTE ITP: ICD-10-CM

## 2025-02-04 DIAGNOSIS — M35.00 SJOGREN'S SYNDROME, WITH UNSPECIFIED ORGAN INVOLVEMENT: ICD-10-CM

## 2025-02-04 DIAGNOSIS — B37.81 THRUSH OF MOUTH AND ESOPHAGUS: ICD-10-CM

## 2025-02-04 DIAGNOSIS — D72.819 LEUKOPENIA, UNSPECIFIED TYPE: ICD-10-CM

## 2025-02-04 DIAGNOSIS — F41.9 ANXIETY: ICD-10-CM

## 2025-02-04 DIAGNOSIS — E06.3 HASHIMOTO'S THYROIDITIS: ICD-10-CM

## 2025-02-04 DIAGNOSIS — M47.12 CERVICAL ARTHRITIS WITH MYELOPATHY: ICD-10-CM

## 2025-02-04 DIAGNOSIS — R76.8 ANA POSITIVE: ICD-10-CM

## 2025-02-04 DIAGNOSIS — M48.062 SPINAL STENOSIS OF LUMBAR REGION WITH NEUROGENIC CLAUDICATION: ICD-10-CM

## 2025-02-04 DIAGNOSIS — G89.4 CHRONIC PAIN SYNDROME: ICD-10-CM

## 2025-02-04 DIAGNOSIS — M35.1 MCTD (MIXED CONNECTIVE TISSUE DISEASE): ICD-10-CM

## 2025-02-04 DIAGNOSIS — B37.0 THRUSH OF MOUTH AND ESOPHAGUS: ICD-10-CM

## 2025-02-04 DIAGNOSIS — M48.02 CERVICAL SPINAL STENOSIS: ICD-10-CM

## 2025-02-04 DIAGNOSIS — M45.9 ANKYLOSING SPONDYLITIS, UNSPECIFIED SITE OF SPINE: ICD-10-CM

## 2025-02-05 RX ORDER — PREGABALIN 25 MG/1
25 CAPSULE ORAL 3 TIMES DAILY
Qty: 90 CAPSULE | Refills: 3 | Status: SHIPPED | OUTPATIENT
Start: 2025-02-05

## 2025-02-13 ENCOUNTER — HOSPITAL ENCOUNTER (OUTPATIENT)
Dept: RADIOLOGY | Facility: HOSPITAL | Age: 65
Discharge: HOME | End: 2025-02-13
Attending: STUDENT IN AN ORGANIZED HEALTH CARE EDUCATION/TRAINING PROGRAM | Admitting: STUDENT IN AN ORGANIZED HEALTH CARE EDUCATION/TRAINING PROGRAM
Payer: MEDICARE

## 2025-02-13 ENCOUNTER — HOSPITAL ENCOUNTER (OUTPATIENT)
Facility: HOSPITAL | Age: 65
Discharge: HOME | End: 2025-02-13
Attending: STUDENT IN AN ORGANIZED HEALTH CARE EDUCATION/TRAINING PROGRAM | Admitting: STUDENT IN AN ORGANIZED HEALTH CARE EDUCATION/TRAINING PROGRAM
Payer: MEDICARE

## 2025-02-13 VITALS
HEART RATE: 65 BPM | WEIGHT: 105 LBS | DIASTOLIC BLOOD PRESSURE: 79 MMHG | BODY MASS INDEX: 21.17 KG/M2 | OXYGEN SATURATION: 98 % | RESPIRATION RATE: 20 BRPM | SYSTOLIC BLOOD PRESSURE: 156 MMHG | TEMPERATURE: 98 F | HEIGHT: 59 IN

## 2025-02-13 DIAGNOSIS — M47.817 SPONDYLOSIS OF LUMBOSACRAL REGION WITHOUT MYELOPATHY OR RADICULOPATHY: ICD-10-CM

## 2025-02-13 DIAGNOSIS — M47.816 LUMBAR SPONDYLOSIS: ICD-10-CM

## 2025-02-13 DIAGNOSIS — M54.50 LOWER BACK PAIN: ICD-10-CM

## 2025-02-13 LAB — GLUCOSE SERPL-MCNC: 128 MG/DL (ref 70–110)

## 2025-02-13 PROCEDURE — 63600175 PHARM REV CODE 636 W HCPCS: Mod: PO | Performed by: STUDENT IN AN ORGANIZED HEALTH CARE EDUCATION/TRAINING PROGRAM

## 2025-02-13 PROCEDURE — 64494 INJ PARAVERT F JNT L/S 2 LEV: CPT | Mod: 50,PO | Performed by: STUDENT IN AN ORGANIZED HEALTH CARE EDUCATION/TRAINING PROGRAM

## 2025-02-13 PROCEDURE — 25000003 PHARM REV CODE 250: Mod: PO | Performed by: STUDENT IN AN ORGANIZED HEALTH CARE EDUCATION/TRAINING PROGRAM

## 2025-02-13 PROCEDURE — 64493 INJ PARAVERT F JNT L/S 1 LEV: CPT | Mod: 50,PO | Performed by: STUDENT IN AN ORGANIZED HEALTH CARE EDUCATION/TRAINING PROGRAM

## 2025-02-13 PROCEDURE — 82962 GLUCOSE BLOOD TEST: CPT | Mod: PO | Performed by: STUDENT IN AN ORGANIZED HEALTH CARE EDUCATION/TRAINING PROGRAM

## 2025-02-13 RX ORDER — SODIUM CHLORIDE 9 MG/ML
INJECTION, SOLUTION INTRAVENOUS ONCE
Status: COMPLETED | OUTPATIENT
Start: 2025-02-13 | End: 2025-02-13

## 2025-02-13 RX ORDER — SODIUM CHLORIDE, SODIUM LACTATE, POTASSIUM CHLORIDE, CALCIUM CHLORIDE 600; 310; 30; 20 MG/100ML; MG/100ML; MG/100ML; MG/100ML
INJECTION, SOLUTION INTRAVENOUS CONTINUOUS
Status: DISCONTINUED | OUTPATIENT
Start: 2025-02-13 | End: 2025-02-13 | Stop reason: HOSPADM

## 2025-02-13 RX ORDER — MIDAZOLAM HYDROCHLORIDE 1 MG/ML
INJECTION INTRAMUSCULAR; INTRAVENOUS
Status: DISCONTINUED | OUTPATIENT
Start: 2025-02-13 | End: 2025-02-13 | Stop reason: HOSPADM

## 2025-02-13 RX ORDER — BUPIVACAINE HYDROCHLORIDE 2.5 MG/ML
INJECTION, SOLUTION EPIDURAL; INFILTRATION; INTRACAUDAL
Status: DISCONTINUED | OUTPATIENT
Start: 2025-02-13 | End: 2025-02-13 | Stop reason: HOSPADM

## 2025-02-13 RX ORDER — LIDOCAINE HYDROCHLORIDE 10 MG/ML
INJECTION, SOLUTION EPIDURAL; INFILTRATION; INTRACAUDAL; PERINEURAL
Status: DISCONTINUED | OUTPATIENT
Start: 2025-02-13 | End: 2025-02-13 | Stop reason: HOSPADM

## 2025-02-13 RX ADMIN — SODIUM CHLORIDE: 9 INJECTION, SOLUTION INTRAVENOUS at 09:02

## 2025-02-13 NOTE — OP NOTE
Patient: Malu Holland                                                    MRN: [id number]  : 1960                                              Date of procedure: 2025        Pre Procedure Diagnosis: Back pain, Lumbar spondylosis without myelopathy/ lumbosacral region    Post Procedure Diagnosis: Same    Procedure: Bilateral Lumbar Medial Branch Nerve Block for L4-5 and L5-S1 joints under Fluoroscopy     Attending: Aisha Salas MD    Local Anesthetic Injected: 1% Lidocaine 10 ml    Anxiolysis Medications: Versed    Estimated Blood Loss: None    Complication: None        Procedure:  After informed consent was obtained, patient was taken to the fluoroscopy suite and placed in a prone position.  The skin was prepped and draped in the usual sterile fashion using chlorhexidine.  Anatomical landmarks were identified with the aid of fluoroscopy in PA and Oblique views, and the skin and subcutaneous tissue were infiltrated with a total of 10mL of 1% Lidocaine via a 25-gauge 1.5inch needle at each of the intended entry sites.  Subsequently, three 22-gauge 3.5inch needles were advanced with the aid of fluoroscopy such that their tips were located at the respective positions of the superior medial border of the transverse processes with the posterior elements at each level.  Needle placement was confirmed with the aid of fluoroscopy.  After negative aspiration, 0.5mL of 0.25% Bupivicaine was injected at each level.  This was repeated on the other side. There were no complications or paresthesias.   Patient tolerated the procedure well and all needles were removed intact.    Patient was observed in recovery and discharged home with written instruction under supervision in stable condition.

## 2025-02-13 NOTE — DISCHARGE SUMMARY
[facility] - Surgery  Discharge Note  Short Stay    Procedure(s) (LRB):  Block-nerve-medial branch-lumbar l4/5 and l5/s1 MBB ( iv sedation) (Bilateral)      OUTCOME: Patient tolerated treatment/procedure well without complication and is now ready for discharge.    DISPOSITION: Home or Self Care    FINAL DIAGNOSIS:  <principal problem not specified>    FOLLOWUP: In clinic    DISCHARGE INSTRUCTIONS:    Discharge Procedure Orders   Notify your health care provider if you experience any of the following:  temperature >100.4     Notify your health care provider if you experience any of the following:  severe uncontrolled pain     Notify your health care provider if you experience any of the following:  redness, tenderness, or signs of infection (pain, swelling, redness, odor or green/yellow discharge around incision site)     Activity as tolerated        TIME SPENT ON DISCHARGE: 20 minutes

## 2025-02-14 ENCOUNTER — TELEPHONE (OUTPATIENT)
Dept: PAIN MEDICINE | Facility: CLINIC | Age: 65
End: 2025-02-14
Payer: MEDICARE

## 2025-02-14 DIAGNOSIS — M47.817 SPONDYLOSIS OF LUMBOSACRAL REGION WITHOUT MYELOPATHY OR RADICULOPATHY: Primary | ICD-10-CM

## 2025-02-14 DIAGNOSIS — M47.816 LUMBAR SPONDYLOSIS: ICD-10-CM

## 2025-02-14 RX ORDER — SODIUM CHLORIDE, SODIUM LACTATE, POTASSIUM CHLORIDE, CALCIUM CHLORIDE 600; 310; 30; 20 MG/100ML; MG/100ML; MG/100ML; MG/100ML
INJECTION, SOLUTION INTRAVENOUS CONTINUOUS
OUTPATIENT
Start: 2025-02-14

## 2025-02-14 NOTE — TELEPHONE ENCOUNTER
Spoke with pt and she said she had 80% relief x several hours her pain score before procedures 7 and after the procedure 3 her current pain score is a 5. Scheduled for 03/06 for the 2nd block .

## 2025-02-20 ENCOUNTER — LAB VISIT (OUTPATIENT)
Dept: LAB | Facility: HOSPITAL | Age: 65
End: 2025-02-20
Attending: STUDENT IN AN ORGANIZED HEALTH CARE EDUCATION/TRAINING PROGRAM
Payer: MEDICARE

## 2025-02-20 ENCOUNTER — OFFICE VISIT (OUTPATIENT)
Dept: FAMILY MEDICINE | Facility: CLINIC | Age: 65
End: 2025-02-20
Payer: MEDICARE

## 2025-02-20 VITALS
HEART RATE: 70 BPM | WEIGHT: 108.5 LBS | HEIGHT: 59 IN | OXYGEN SATURATION: 99 % | BODY MASS INDEX: 21.87 KG/M2 | RESPIRATION RATE: 18 BRPM | DIASTOLIC BLOOD PRESSURE: 80 MMHG | TEMPERATURE: 98 F | SYSTOLIC BLOOD PRESSURE: 139 MMHG

## 2025-02-20 DIAGNOSIS — D69.3 ACUTE ITP: ICD-10-CM

## 2025-02-20 DIAGNOSIS — E11.69 TYPE 2 DIABETES MELLITUS WITH OTHER SPECIFIED COMPLICATION, UNSPECIFIED WHETHER LONG TERM INSULIN USE: ICD-10-CM

## 2025-02-20 DIAGNOSIS — E11.69 TYPE 2 DIABETES MELLITUS WITH OTHER SPECIFIED COMPLICATION, UNSPECIFIED WHETHER LONG TERM INSULIN USE: Primary | ICD-10-CM

## 2025-02-20 DIAGNOSIS — N93.9 VAGINAL BLEEDING: ICD-10-CM

## 2025-02-20 DIAGNOSIS — F17.290 VAPING NICOTINE DEPENDENCE, TOBACCO PRODUCT: ICD-10-CM

## 2025-02-20 LAB
ALBUMIN/CREAT UR: ABNORMAL UG/MG (ref 0–30)
CREAT UR-MCNC: 11 MG/DL (ref 15–325)
MICROALBUMIN UR DL<=1MG/L-MCNC: <5 UG/ML

## 2025-02-20 PROCEDURE — 82570 ASSAY OF URINE CREATININE: CPT | Performed by: STUDENT IN AN ORGANIZED HEALTH CARE EDUCATION/TRAINING PROGRAM

## 2025-02-20 RX ORDER — DICYCLOMINE HYDROCHLORIDE 10 MG/1
CAPSULE ORAL
COMMUNITY
Start: 2025-01-10

## 2025-02-20 NOTE — PROGRESS NOTES
1. Type 2 diabetes mellitus with other specified complication, unspecified whether long term insulin use  Overview:  Well controlled   Lab Results   Component Value Date    HGBA1C 5.8 (H) 07/29/2024       Hypoglycemic Events: none     -current meds:   Diabetes Medications              metFORMIN (GLUCOPHAGE-XR) 500 MG ER 24hr tablet Take 1 tablet (500 mg total) by mouth daily with breakfast.          -on statin:   Hyperlipidemia Medications              pravastatin (PRAVACHOL) 40 MG tablet Take 40 mg by mouth.          -on ACE-I/ARB:   Hypertension Medications              lisinopriL (PRINIVIL,ZESTRIL) 5 MG tablet Take 1 tablet (5 mg total) by mouth once daily.          -counseling provided on importance of diabetic diet and medication compliance in order to treat diabetes  -discussed diabetes disease course and potential complications  Follow up 3 months       Orders:  -     Microalbumin/Creatinine Ratio, Urine; Standing    2. Acute ITP  Overview:  Lab Results   Component Value Date    WBC 4.68 12/02/2024    HGB 14.6 12/02/2024    HCT 44.6 12/02/2024    MCV 92 12/02/2024     12/02/2024       Resolved       3. Vaping nicotine dependence, tobacco product  Overview:  Assistance with smoking cessation was offered, including:  [x]  Medications  [x]  Counseling  []  Printed Information on Smoking Cessation  [x]  Referral to a Smoking Cessation Program    Patient was counseled regarding smoking for 3-10 minutes.    Previously smoked 1-2ppd >40 years, quit smoking 2012, now vaping         4. Vaginal bleeding  -     US Transvaginal Non OB; Future; Expected date: 02/20/2025  -     Cancel: Ambulatory referral/consult to Obstetrics / Gynecology; Future; Expected date: 02/27/2025  -     Ambulatory referral/consult to Obstetrics / Gynecology; Future; Expected date: 02/27/2025         Assessment & Plan    IRON DEFICIENCY:  - Considered iron supplementation but opted for dietary changes due to patient's inability to tolerate  iron pills and meat. PO causes constipation.  - Patient to increase intake of iron-rich foods, particularly spinach and beets.  - Patient to consider adding spinach to smoothies with blueberries for easier consumption.    POST-HYSTERECTOMY VAGINAL BLEEDING:  - Investigating vaginal bleeding: Ordered pelvic ultrasound and OBGYN referral to evaluate post-hysterectomy bleeding.    ACID REFLUX:  - Discussed potential triggers for acid reflux, including eating too quickly and overeating.  - Continued pantoprazole for acid reflux management.    CONSTIPATION:  - Continued Miralax: 1 dose in the morning, 1 dose at night.    FOLLOW UP:  - Follow up in 6 months.  - Patient to bring eye exam results from upcoming appointment with Dr. Prasad.  - Patient to obtain and bring colonoscopy records from Dr. Leiner's office at [facility] and [facility].            Makenna Rodgers MD  _________________________________________________________________________      Patient ID: Malu Holland is a 64 y.o. female.    History of Present Illness    CHIEF COMPLAINT:  Patient presents today for follow up     BACK PAIN:  She received her first nerve block injection at L4, L5, and S1 last week, reporting significant improvement in pain that has lasted longer than the expected 8 hours. Her second nerve block is scheduled for March 6th, after which she anticipates proceeding with radiofrequency ablation of the sciatic nerve.    GI HISTORY:  She has a history of two colectomies - first approximately 8-9 years ago and second in January 2024 [facility]. She currently takes Miralax twice daily (morning and night) along with one regular and two natural stool softeners.    GYNECOLOGICAL HISTORY:  She had a hysterectomy with partial cervical removal in early 30s. She reports persistent vaginal bleeding for over a year requiring daily pad use, with discharge appearing as mixed blood with darker material. She experiences pain and burning  sensation during intercourse.    SOCIAL HISTORY:  She is a former cigarette smoker who quit in 2012, having started at age 17 with consumption up to 3-4 packs per day when frequenting bars. She currently vapes with nicotine-containing, flavored e-liquids. She has a history of chronic alcoholism with associated liver issues but denies current alcohol use. She does not attend 12-step programs.          Past medical histories reviewed, including past medical, surgical, family and social histories.      Medications Ordered Prior to Encounter[1]    Review of Systems   12 point review of systems negative except for listed in HPI.     Objective:    Nursing note and vitals reviewed.  Vitals:    02/20/25 0829   BP: 139/80   Pulse: 70   Resp: 18   Temp: 97.8 °F (36.6 °C)     Body mass index is 21.91 kg/m².     Physical Exam   Constitutional: oriented to person, place, and time. well-developed and well-nourished. No distress.   HENT: WNL  Head: Normocephalic and atraumatic.   Eyes: EOM are normal.   Neck: Normal range of motion. Neck supple.   Cardiovascular: Normal rate  Pulmonary/Chest: Effort normal. No respiratory distress.   GI: soft, non distended, no ttp, no rebound/guarding  Musculoskeletal: Normal range of motion. no edema.   Neurological: CN II-XII intact  Skin: warm and dry.   Psychiatric: normal mood and affect. behavior is normal.   Feet: corn to base of l fourth toe, onychomycosis     Right foot: 2+ DP pulse     sensation intact to monofilament     Skin integrity: No ulcer, blister, skin breakdown, erythema. No Callus/dry skin.      Left foot: 2+ DP pulse     sensation intact to monofilament      Skin integrity: No ulcer, blister, skin breakdown, erythema. no Callus/dry skin.   Skin: Little fungus on toes (onychomycosis). Redness on right pinky toe. Corn on the base of the left fourth toe.               We Offer Telehealth & Same Day Appointments!   Book your Telehealth appointment with me through my nurse or    Clinic appointments on [email or web address]!  [phone]     To Schedule appointments online, go to [facility]: [email or web address]       Visit today included increased complexity associated with the care of the episodic problem addressed and managing the longitudinal care of the patient due to the serious and/or complex managed problem(s) as per problem list.     This note was generated with the assistance of ambient listening technology. Verbal consent was obtained by the patient and accompanying visitor(s) for the recording of patient appointment to facilitate this note. I attest to having reviewed and edited the generated note for accuracy, though some syntax or spelling errors may persist. Please contact the author of this note for any clarification.              [1]   Current Outpatient Medications on File Prior to Visit   Medication Sig Dispense Refill    cetirizine (ZYRTEC) 10 MG tablet Take 10 mg by mouth daily as needed.      clobetasoL (TEMOVATE) 0.05 % external solution Apply topically 2 (two) times daily. 150 mL 3    cyclobenzaprine (FLEXERIL) 10 MG tablet Take 1 tablet (10 mg total) by mouth 3 (three) times daily as needed for Muscle spasms. 90 tablet 5    cycloSPORINE (RESTASIS) 0.05 % ophthalmic emulsion       dicyclomine (BENTYL) 10 MG capsule SMARTSI Capsule(s) By Mouth 4 Times Daily PRN      docusate sodium (COLACE) 100 MG capsule Take 1 capsule (100 mg total) by mouth 2 (two) times daily. 180 capsule 3    famotidine (PEPCID) 40 MG tablet Take 1 tablet (40 mg total) by mouth every evening. 30 tablet 3    HYDROcodone-acetaminophen (NORCO)  mg per tablet Take 1 tablet by mouth every 8 (eight) hours as needed for Pain. 90 tablet 0    lisinopriL (PRINIVIL,ZESTRIL) 5 MG tablet Take 1 tablet (5 mg total) by mouth once daily. 90 tablet 3    metFORMIN (GLUCOPHAGE-XR) 500 MG ER 24hr tablet Take 1 tablet (500 mg total) by mouth daily with breakfast. 90 tablet 3     miscellaneous medical supply (C-TUB) Misc Glucometer and strips#100      multivitamin (THERAGRAN) per tablet Take 1 tablet by mouth.      ondansetron (ZOFRAN-ODT) 4 MG TbDL Take 1 tablet (4 mg total) by mouth every 8 (eight) hours as needed (nausea). 15 tablet 0    pantoprazole (PROTONIX) 40 MG tablet Take 1 tablet (40 mg total) by mouth once daily. 30 tablet 2    paroxetine (PAXIL) 20 MG tablet Take 1 tablet (20 mg total) by mouth every evening. 90 tablet 3    polyethylene glycol (GLYCOLAX) 17 gram PwPk Take 17 g by mouth once daily. 100 each 3    pravastatin (PRAVACHOL) 40 MG tablet Take 40 mg by mouth.      pregabalin (LYRICA) 25 MG capsule TAKE ONE CAPSULE BY MOUTH THREE TIMES DAILY 90 capsule 3    secukinumab (COSENTYX PEN, 2 PENS,) 150 mg/mL PnIj Inject 300 mg into the skin every 28 days. 2 mL 11    traZODone (DESYREL) 100 MG tablet Take 1 tablet (100 mg total) by mouth every evening. 90 tablet 3    triamcinolone acetonide 0.025% (KENALOG) 0.025 % cream APPLY TO THE AFFECTED AREA TOPICALLY twice daily FOR 7 DAYS      triamcinolone acetonide 0.1% (KENALOG) 0.1 % ointment Apply topically 2 (two) times daily. 30 g 3    valACYclovir (VALTREX) 1000 MG tablet Take 1 tablet (1,000 mg total) by mouth 3 (three) times daily. 42 tablet 2    zinc gluconate 50 mg tablet Take 50 mg by mouth.      [DISCONTINUED] lisinopriL-hydrochlorothiazide (PRINZIDE,ZESTORETIC) 20-12.5 mg per tablet Take 1 tablet by mouth once daily.      [DISCONTINUED] aloe vera 5,000 mg Cap Take by mouth. (Patient not taking: Reported on 2/20/2025)      [DISCONTINUED] cloNIDine (CATAPRES) 0.1 MG tablet TAKE ONE TABLET BY MOUTH TWICE DAILY      [DISCONTINUED] guaiFENesin (MUCINEX) 600 mg 12 hr tablet Take 1,200 mg by mouth. (Patient not taking: Reported on 2/20/2025)      [DISCONTINUED] HYDROcodone-acetaminophen (NORCO)  mg per tablet Take 1 tablet by mouth every 8 (eight) hours as needed for Pain. 90 tablet 0    [DISCONTINUED] omeprazole  (PRILOSEC) 20 MG capsule Take 20 mg by mouth.       No current facility-administered medications on file prior to visit.

## 2025-02-21 ENCOUNTER — RESULTS FOLLOW-UP (OUTPATIENT)
Dept: FAMILY MEDICINE | Facility: CLINIC | Age: 65
End: 2025-02-21
Payer: MEDICARE

## 2025-02-21 NOTE — PROGRESS NOTES
I have sent a msg to patient with the following interpretation (see below):      - Urine microalbumin indicates your kidneys are NOT spilling protein into the urine and are working well. Excellent! The best way to ensure   good kidney function is good blood pressure and blood sugar control.      Please do not hesitate to call or message with any questions or concerns    Makenna Rodgers MD

## 2025-02-24 ENCOUNTER — HOSPITAL ENCOUNTER (OUTPATIENT)
Dept: RADIOLOGY | Facility: HOSPITAL | Age: 65
Discharge: HOME | End: 2025-02-24
Attending: STUDENT IN AN ORGANIZED HEALTH CARE EDUCATION/TRAINING PROGRAM
Payer: MEDICARE

## 2025-02-24 DIAGNOSIS — N93.9 VAGINAL BLEEDING: ICD-10-CM

## 2025-02-24 PROCEDURE — 76830 TRANSVAGINAL US NON-OB: CPT | Mod: TC,PO

## 2025-02-24 PROCEDURE — 76856 US EXAM PELVIC COMPLETE: CPT | Mod: 26,,, | Performed by: RADIOLOGY

## 2025-02-24 PROCEDURE — 76830 TRANSVAGINAL US NON-OB: CPT | Mod: 26,,, | Performed by: RADIOLOGY

## 2025-02-26 ENCOUNTER — PATIENT OUTREACH (OUTPATIENT)
Dept: ADMINISTRATIVE | Facility: HOSPITAL | Age: 65
End: 2025-02-26
Payer: MEDICARE

## 2025-02-26 DIAGNOSIS — E11.9 TYPE 2 DIABETES MELLITUS WITHOUT COMPLICATION: ICD-10-CM

## 2025-02-26 NOTE — PROGRESS NOTES
[facility] COLONOSCOPY 02/20/2025 uploaded; faxed to [facility] - Dr. Alvarenga and Dr. Conner 1x to request. Reminder set.

## 2025-02-27 ENCOUNTER — OFFICE VISIT (OUTPATIENT)
Dept: GASTROENTEROLOGY | Facility: CLINIC | Age: 65
End: 2025-02-27
Payer: MEDICARE

## 2025-02-27 VITALS — BODY MASS INDEX: 22 KG/M2 | HEIGHT: 59 IN | WEIGHT: 109.13 LBS

## 2025-02-27 DIAGNOSIS — K31.84 GASTROPARESIS: ICD-10-CM

## 2025-02-27 DIAGNOSIS — K59.09 CHRONIC CONSTIPATION: ICD-10-CM

## 2025-02-27 DIAGNOSIS — R13.10 PILL DYSPHAGIA: Primary | ICD-10-CM

## 2025-02-27 DIAGNOSIS — R11.0 NAUSEA: ICD-10-CM

## 2025-02-27 DIAGNOSIS — K21.9 GERD WITHOUT ESOPHAGITIS: ICD-10-CM

## 2025-02-27 PROCEDURE — 1101F PT FALLS ASSESS-DOCD LE1/YR: CPT | Mod: CPTII,S$GLB,, | Performed by: INTERNAL MEDICINE

## 2025-02-27 PROCEDURE — 3061F NEG MICROALBUMINURIA REV: CPT | Mod: CPTII,S$GLB,, | Performed by: INTERNAL MEDICINE

## 2025-02-27 PROCEDURE — 3008F BODY MASS INDEX DOCD: CPT | Mod: CPTII,S$GLB,, | Performed by: INTERNAL MEDICINE

## 2025-02-27 PROCEDURE — 99999 PR PBB SHADOW E&M-EST. PATIENT-LVL IV: CPT | Mod: PBBFAC,,, | Performed by: INTERNAL MEDICINE

## 2025-02-27 PROCEDURE — 3288F FALL RISK ASSESSMENT DOCD: CPT | Mod: CPTII,S$GLB,, | Performed by: INTERNAL MEDICINE

## 2025-02-27 PROCEDURE — 3066F NEPHROPATHY DOC TX: CPT | Mod: CPTII,S$GLB,, | Performed by: INTERNAL MEDICINE

## 2025-02-27 PROCEDURE — 99213 OFFICE O/P EST LOW 20 MIN: CPT | Mod: S$GLB,,, | Performed by: INTERNAL MEDICINE

## 2025-02-27 PROCEDURE — 4010F ACE/ARB THERAPY RXD/TAKEN: CPT | Mod: CPTII,S$GLB,, | Performed by: INTERNAL MEDICINE

## 2025-02-27 PROCEDURE — 1126F AMNT PAIN NOTED NONE PRSNT: CPT | Mod: CPTII,S$GLB,, | Performed by: INTERNAL MEDICINE

## 2025-02-27 RX ORDER — PANTOPRAZOLE SODIUM 40 MG/1
40 TABLET, DELAYED RELEASE ORAL DAILY
Qty: 90 TABLET | Refills: 3 | Status: SHIPPED | OUTPATIENT
Start: 2025-02-27

## 2025-02-27 RX ORDER — FAMOTIDINE 40 MG/1
40 TABLET, FILM COATED ORAL NIGHTLY
Qty: 90 TABLET | Refills: 3 | Status: SHIPPED | OUTPATIENT
Start: 2025-02-27 | End: 2026-02-27

## 2025-02-27 RX ORDER — ONDANSETRON 4 MG/1
4 TABLET, ORALLY DISINTEGRATING ORAL EVERY 8 HOURS PRN
Qty: 40 TABLET | Refills: 3 | Status: SHIPPED | OUTPATIENT
Start: 2025-02-27

## 2025-02-27 NOTE — PROGRESS NOTES
"Subjective     Patient ID: Malu Holland is a 64 y.o. female.    Chief Complaint: Follow-up    Ms. Jung returns for routine follow-up, after recent EGD (see below).  She also reports she has had 3 or 4 swallowing test over the years.  She has been told that she has to be careful with her liquids and pills.  She has also been told about the possibility of gastroparesis.  She finds that the Pepcid is helping.  She is also having regular bowel movements, Davie level 5.  She denies anorexia.    She is also having nerve blocks done, a last at the end of January.  Her next 1 is coming up March 6th.  And then they might do a sciatic" burning ".    Her LFTs are also better after stopping to medications (Plaquenil and pravastatin.    She also reports her sister has pancreas trouble.  That she may be developing pancreatic cancer (? ? ).        See recent Upper GI endoscopy 11/14/2024:  Indications:           Upper abdominal pain, Pharyngeal phase dysphagia,                          Nausea, Weight loss   Providers:             Oswald Paniagua MD   Impression:    - Normal oropharynx.                          - Normal cricopharyngeus.                          - Normal esophagus. Biopsied.                          - Z-line regular, 36 cm from the incisors.                          - Normal cardia.                          - Normal gastric fundus and gastric body.                          - Gastritis.                          - White nummular lesions in gastric mucosa.  Biopsied.                          - Normal pylorus.                          - Normal examined duodenum.                          - Normal major papilla.                          - No endoscopic esophageal abnormality to explain                          patient's dysphagia. Esophagus dilated, 51 Fr.   Recommendation:        - Discharge patient to home.                          - Observe patient's clinical course following                          today's " procedure with therapeutic intervention.                          - Await pathology results.                          - Follow an antireflux regimen.                          - Gastroparesis diet.                          - Await pathology results.                          - Continue present medications.                          - Use Protonix (pantoprazole) 40 mg PO daily.                          - Use Pepcid (famotidine) 40 mg PO nightly.                          - Return to GI clinic in 2-3 months.   Oswald Paniagua MD   11/14/2024     1. BIOPSY OF DISTAL ESOPHAGUS:   INNOCUOUS SQUAMOUS MUCOSA WITH NO SIGNIFICANT HISTOLOGIC ALTERATION   NO EOSINOPHILIC INFILTRATE IDENTIFIED     2. PRE-PYLORIC BIOPSIES:   FOCAL INTESTINALIZATION WITH NO DYSPLASIA IDENTIFIED   MILD FIBROSIS OF THE LAMINA PROPRIA WITH MINIMAL CHRONIC INFLAMMATION   NO HELICOBACTER HAVE BEEN IDENTIFIED WITH AN IMMUNOHISTOCHEMICAL STAIN FOR HELICOBACTER.   THE POSITIVE AND NEGATIVE CONTROLS STAINED APPROPRIATELY         7/3/2023    [facility] UPPER GI WITH KUB  FINDINGS:  Limited double contrast imaging of the esophagus demonstrates a normal mucosal pattern. There is no evidence of stricture, mass, or ulcer.  Imaging of the stomach demonstrates a normal mucosal pattern. There is no evidence of mass or ulcer. Transit time is within normal limits.  The duodenal bulb and sweep appear normal.  Moderate gastroesophageal reflux was identified during the study.        Review of Systems   Constitutional:  Negative for appetite change, fever and unexpected weight change.   HENT:  Positive for trouble swallowing (See discussion above.). Negative for mouth sores and sore throat.    Eyes: Negative.    Respiratory: Negative.  Negative for cough and choking.    Cardiovascular: Negative.  Negative for chest pain and leg swelling.   Gastrointestinal:  Negative for abdominal distention, abdominal pain, anal bleeding, blood in stool, constipation, diarrhea, nausea  and vomiting.   Genitourinary: Negative.    Musculoskeletal: Negative.    Integumentary:  Negative for color change and rash. Negative.   Neurological: Negative.    Hematological:  Negative for adenopathy.   Psychiatric/Behavioral: Negative.            Objective     Physical Exam  Vitals and nursing note reviewed. Exam conducted with a chaperone present.   Constitutional:       General: She is not in acute distress.     Appearance: Normal appearance. She is well-developed and normal weight.   HENT:      Mouth/Throat:      Mouth: Mucous membranes are moist.      Pharynx: No oropharyngeal exudate.   Eyes:      General: No scleral icterus.  Neck:      Thyroid: No thyromegaly.      Trachea: No tracheal deviation.   Cardiovascular:      Rate and Rhythm: Normal rate and regular rhythm.      Heart sounds: Normal heart sounds.   Pulmonary:      Effort: Pulmonary effort is normal.      Breath sounds: Normal breath sounds.   Abdominal:      General: Bowel sounds are normal. There is no distension.      Palpations: Abdomen is soft. There is no mass.      Tenderness: There is no abdominal tenderness. There is no guarding.   Lymphadenopathy:      Cervical: No cervical adenopathy.   Skin:     General: Skin is warm and dry.      Findings: No rash.   Neurological:      General: No focal deficit present.      Mental Status: She is alert and oriented to person, place, and time.   Psychiatric:         Mood and Affect: Mood normal.         Behavior: Behavior normal.            Assessment and Plan     Pill dysphagia    GERD without esophagitis  -     pantoprazole (PROTONIX) 40 MG tablet; Take 1 tablet (40 mg total) by mouth once daily.  Dispense: 90 tablet; Refill: 3    Nausea  -     ondansetron (ZOFRAN-ODT) 4 MG TbDL; Take 1 tablet (4 mg total) by mouth every 8 (eight) hours as needed (nausea).  Dispense: 40 tablet; Refill: 3    Gastroparesis  -     famotidine (PEPCID) 40 MG tablet; Take 1 tablet (40 mg total) by mouth every evening.   Dispense: 90 tablet; Refill: 3    Chronic constipation      Continue meds as she is doing.  Refill the pantoprazole and famotidine.  Refills on Zofran.  RTC 3-6 months.         No follow-ups on file.

## 2025-03-06 ENCOUNTER — HOSPITAL ENCOUNTER (OUTPATIENT)
Facility: HOSPITAL | Age: 65
Discharge: HOME | End: 2025-03-06
Attending: STUDENT IN AN ORGANIZED HEALTH CARE EDUCATION/TRAINING PROGRAM | Admitting: STUDENT IN AN ORGANIZED HEALTH CARE EDUCATION/TRAINING PROGRAM
Payer: MEDICARE

## 2025-03-06 ENCOUNTER — HOSPITAL ENCOUNTER (OUTPATIENT)
Dept: RADIOLOGY | Facility: HOSPITAL | Age: 65
Discharge: HOME | End: 2025-03-06
Attending: STUDENT IN AN ORGANIZED HEALTH CARE EDUCATION/TRAINING PROGRAM | Admitting: STUDENT IN AN ORGANIZED HEALTH CARE EDUCATION/TRAINING PROGRAM
Payer: MEDICARE

## 2025-03-06 VITALS
RESPIRATION RATE: 16 BRPM | DIASTOLIC BLOOD PRESSURE: 76 MMHG | WEIGHT: 108 LBS | OXYGEN SATURATION: 100 % | BODY MASS INDEX: 21.77 KG/M2 | SYSTOLIC BLOOD PRESSURE: 155 MMHG | HEART RATE: 60 BPM | HEIGHT: 59 IN | TEMPERATURE: 97 F

## 2025-03-06 DIAGNOSIS — M47.816 LUMBAR SPONDYLOSIS: ICD-10-CM

## 2025-03-06 DIAGNOSIS — M47.817 SPONDYLOSIS OF LUMBOSACRAL REGION WITHOUT MYELOPATHY OR RADICULOPATHY: ICD-10-CM

## 2025-03-06 DIAGNOSIS — M54.50 LOWER BACK PAIN: ICD-10-CM

## 2025-03-06 LAB — GLUCOSE SERPL-MCNC: 113 MG/DL (ref 70–110)

## 2025-03-06 PROCEDURE — 64493 INJ PARAVERT F JNT L/S 1 LEV: CPT | Mod: 50,PO | Performed by: STUDENT IN AN ORGANIZED HEALTH CARE EDUCATION/TRAINING PROGRAM

## 2025-03-06 PROCEDURE — 64494 INJ PARAVERT F JNT L/S 2 LEV: CPT | Mod: 50,,, | Performed by: STUDENT IN AN ORGANIZED HEALTH CARE EDUCATION/TRAINING PROGRAM

## 2025-03-06 PROCEDURE — 63600175 PHARM REV CODE 636 W HCPCS: Mod: PO | Performed by: STUDENT IN AN ORGANIZED HEALTH CARE EDUCATION/TRAINING PROGRAM

## 2025-03-06 PROCEDURE — 64494 INJ PARAVERT F JNT L/S 2 LEV: CPT | Mod: 50,PO | Performed by: STUDENT IN AN ORGANIZED HEALTH CARE EDUCATION/TRAINING PROGRAM

## 2025-03-06 PROCEDURE — 64493 INJ PARAVERT F JNT L/S 1 LEV: CPT | Mod: 50,,, | Performed by: STUDENT IN AN ORGANIZED HEALTH CARE EDUCATION/TRAINING PROGRAM

## 2025-03-06 RX ORDER — SODIUM CHLORIDE, SODIUM LACTATE, POTASSIUM CHLORIDE, CALCIUM CHLORIDE 600; 310; 30; 20 MG/100ML; MG/100ML; MG/100ML; MG/100ML
INJECTION, SOLUTION INTRAVENOUS CONTINUOUS
Status: DISCONTINUED | OUTPATIENT
Start: 2025-03-06 | End: 2025-03-06 | Stop reason: HOSPADM

## 2025-03-06 RX ORDER — BUPIVACAINE HYDROCHLORIDE 2.5 MG/ML
INJECTION, SOLUTION EPIDURAL; INFILTRATION; INTRACAUDAL; PERINEURAL
Status: DISCONTINUED | OUTPATIENT
Start: 2025-03-06 | End: 2025-03-06 | Stop reason: HOSPADM

## 2025-03-06 RX ORDER — MIDAZOLAM HYDROCHLORIDE 1 MG/ML
INJECTION INTRAMUSCULAR; INTRAVENOUS
Status: DISCONTINUED | OUTPATIENT
Start: 2025-03-06 | End: 2025-03-06 | Stop reason: HOSPADM

## 2025-03-06 RX ORDER — LIDOCAINE HYDROCHLORIDE 10 MG/ML
INJECTION, SOLUTION EPIDURAL; INFILTRATION; INTRACAUDAL; PERINEURAL
Status: DISCONTINUED | OUTPATIENT
Start: 2025-03-06 | End: 2025-03-06 | Stop reason: HOSPADM

## 2025-03-06 NOTE — OP NOTE
Patient: Malu Holland                                                    MRN: [id number]  : 1960                                              Date of procedure: 3/6/2025        Pre Procedure Diagnosis: Back pain, Lumbar spondylosis without myelopathy/ lumbosacral region    Post Procedure Diagnosis: Same    Procedure: Bilateral Lumbar Medial Branch Nerve Block for L4-5 and L5-S1 joints under Fluoroscopy     Attending: Aisha Salas MD    Local Anesthetic Injected: 1% Lidocaine 10 ml    Anxiolysis Medications: Versed    Estimated Blood Loss: None    Complication: None        Procedure:  After informed consent was obtained, patient was taken to the fluoroscopy suite and placed in a prone position.  The skin was prepped and draped in the usual sterile fashion using chlorhexidine.  Anatomical landmarks were identified with the aid of fluoroscopy in PA and Oblique views, and the skin and subcutaneous tissue were infiltrated with a total of 10mL of 1% Lidocaine via a 25-gauge 1.5inch needle at each of the intended entry sites.  Subsequently, three 22-gauge 3.5inch needles were advanced with the aid of fluoroscopy such that their tips were located at the respective positions of the superior medial border of the transverse processes with the posterior elements at each level.  Needle placement was confirmed with the aid of fluoroscopy.  After negative aspiration, 0.5mL of 0.25% Bupivicaine was injected at each level.  This was repeated on the other side. There were no complications or paresthesias.   Patient tolerated the procedure well and all needles were removed intact.    Patient was observed in recovery and discharged home with written instruction under supervision in stable condition.

## 2025-03-06 NOTE — H&P
"CC: back pain    HPI: The patient is a 64 y.o. female with a history of back pain here for B MBB L4-5 and L5-S1. There are no major changes in history and physical from 25 by Dr. Salas.    Past Medical History:   Diagnosis Date    Acid reflux     Diabetes mellitus     Dry mouth     Gastroparesis     Hypertension        Past Surgical History:   Procedure Laterality Date    ADENOIDECTOMY      APPENDECTOMY       SECTION      CHOLECYSTECTOMY      COLON SURGERY      x 2; first was in  Dr. Conner due to diverticulitis; second one done by Dr. Thomas    COLONOSCOPY  2023    done in [place]; repots part of her colon resection "opened up"    ESOPHAGOGASTRODUODENOSCOPY N/A 2024    Procedure: EGD (ESOPHAGOGASTRODUODENOSCOPY);  Surgeon: Oswald Paniagua Jr., MD;  Location: [facility] ENDO;  Service: Endoscopy;  Laterality: N/A;    HYSTERECTOMY      INJECTION OF ANESTHETIC AGENT AROUND MEDIAL BRANCH NERVES INNERVATING LUMBAR FACET JOINT Bilateral 2025    Procedure: Block-nerve-medial branch-lumbar l4/5 and l5/s1 MBB ( iv sedation);  Surgeon: Aisha Salas MD;  Location: [facility] OR;  Service: Pain Management;  Laterality: Bilateral;    LASIK      OOPHORECTOMY Left     TONSILLECTOMY      UPPER GASTROINTESTINAL ENDOSCOPY  2023    done at [facility]       Family History   Problem Relation Name Age of Onset    Lupus Mother      Osteoarthritis Mother      Heart disease Mother      Heart disease Father      Cancer Father      Lupus Sister      Colon cancer Neg Hx      Crohn's disease Neg Hx      Ulcerative colitis Neg Hx      Stomach cancer Neg Hx      Esophageal cancer Neg Hx         Social History     Socioeconomic History    Marital status:    Tobacco Use    Smoking status: Former     Types: Vaping with nicotine     Quit date: 2012     Years since quittin.2    Smokeless tobacco: Never   Substance and Sexual Activity    Alcohol use: Not Currently     Comment: sober    Drug " "use: Never     Social Drivers of Health     Financial Resource Strain: Medium Risk (1/10/2022)    Received from [facility]    Overall Financial Resource Strain (CARDIA)     Difficulty of Paying Living Expenses: Somewhat hard   Food Insecurity: Food Insecurity Present (1/10/2022)    Received from [facility]    Hunger Vital Sign     Worried About Running Out of Food in the Last Year: Sometimes true     Ran Out of Food in the Last Year: Sometimes true   Transportation Needs: Unknown (1/10/2022)    Received from [facility]    PRAPARE - Transportation     Lack of Transportation (Medical): No     Lack of Transportation (Non-Medical): Patient declined   Physical Activity: Unknown (1/10/2022)    Received from [facility]    Exercise Vital Sign     Days of Exercise per Week: Patient declined     Minutes of Exercise per Session: 0 min   Stress: No Stress Concern Present (10/1/2024)    Received from [facility] [employer] - Occupational Stress Questionnaire     Feeling of Stress : Only a little   Recent Concern: Stress - Stress Concern Present (7/30/2024)    Received from [facility] [employer] - Occupational Stress Questionnaire     Feeling of Stress : To some extent   Housing Stability: Low Risk  (1/10/2022)    Received from [facility]    Housing Stability Vital Sign     Unable to Pay for Housing in the Last Year: No     Number of Places Lived in the Last Year: 1     Unstable Housing in the Last Year: No       Current Medications[1]    Review of patient's allergies indicates:   Allergen Reactions    Glucotrol [glipizide] Hallucinations     Low glucose    Cymbalta [duloxetine] Other (See Comments)     Altered mental status       Vitals:    02/27/25 1057   Weight: 49 kg (108 lb)   Height: 4' 11" (1.499 m)       REVIEW OF SYSTEMS:     GENERAL: No weight loss, malaise or fevers.  HEENT:  No recent changes in vision or hearing  NECK: Negative for lumps, no " difficulty with swallowing.  RESPIRATORY: Negative for cough, wheezing or shortness of breath, patient denies any recent URI.  CARDIOVASCULAR: Negative for chest pain, leg swelling or palpitations.  GI: Negative for abdominal discomfort, blood in stools or black stools or change in bowel habits.  MUSCULOSKELETAL: See HPI.  SKIN: Negative for lesions, rash, and itching.  PSYCH: No suicidal or homicidal ideations, no current mood disturbances.  HEMATOLOGY/LYMPHOLOGY: Negative for prolonged bleeding, bruising easily or swollen nodes. Patient is not currently taking any anti-coagulants  ENDO: No history of diabetes or thyroid dysfunction  NEURO: No history of syncope, paralysis, seizures or tremors.All other reviewed and negative other than HPI.    Physical exam:  Gen: A and O x3, pleasant, well-groomed  Skin: No rashes or obvious lesions  HEENT: PERRLA, no obvious deformities on ears or in canals. No thyroid masses, trachea midline, no palpable lymph nodes in neck, axilla.  CVS: Regular rate and rhythm, normal S1 and S2, no murmurs.  Resp: Clear to auscultation bilaterally.  Abdomen: Soft, NT/ND, normal bowel sounds present.  Musculoskeletal/Neuro: Moving all extremities    Assessment:  There are no diagnoses linked to this encounter.      PLAN: B MBB L4-5 and L5-S1      This patient has been cleared for surgery in an ambulatory surgical facility    ASA 3,  Mallampatti Score 3  No history of anesthetic complications  Plan for RN IV sedation        [1]   No current facility-administered medications for this encounter.

## 2025-03-06 NOTE — DISCHARGE SUMMARY
[facility] - Surgery  Discharge Note  Short Stay    Procedure(s) (LRB):  Block-nerve-medial branch-lumbar l4/5 and l5/s1 MBB #2 ( rn sedation) (Bilateral)      OUTCOME: Patient tolerated treatment/procedure well without complication and is now ready for discharge.    DISPOSITION: Home or Self Care    FINAL DIAGNOSIS:  <principal problem not specified>    FOLLOWUP: In clinic    DISCHARGE INSTRUCTIONS:    Discharge Procedure Orders   Notify your health care provider if you experience any of the following:  temperature >100.4     Notify your health care provider if you experience any of the following:  severe uncontrolled pain     Notify your health care provider if you experience any of the following:  redness, tenderness, or signs of infection (pain, swelling, redness, odor or green/yellow discharge around incision site)     Activity as tolerated        TIME SPENT ON DISCHARGE: 20 minutes

## 2025-03-18 ENCOUNTER — PATIENT MESSAGE (OUTPATIENT)
Dept: FAMILY MEDICINE | Facility: CLINIC | Age: 65
End: 2025-03-18
Payer: MEDICARE

## 2025-03-20 ENCOUNTER — RESULTS FOLLOW-UP (OUTPATIENT)
Dept: FAMILY MEDICINE | Facility: CLINIC | Age: 65
End: 2025-03-20

## 2025-03-20 NOTE — PROGRESS NOTES
I have sent a msg to patient with the following interpretation (see below):  Us pelvis shows l ovarian cysts and recommended to repeat US in 6m. I will send this message to obgyn to discuss best next steps at your 3/24 appt.    Last time you were in clinic, your blood pressure was HIGH. Please check at home and send updated readings.      -Dr. Rodgers

## 2025-04-19 DIAGNOSIS — M35.00 SJOGREN'S SYNDROME, WITH UNSPECIFIED ORGAN INVOLVEMENT: ICD-10-CM

## 2025-04-19 DIAGNOSIS — M35.1 MCTD (MIXED CONNECTIVE TISSUE DISEASE): ICD-10-CM

## 2025-04-19 DIAGNOSIS — M48.02 CERVICAL SPINAL STENOSIS: ICD-10-CM

## 2025-04-19 DIAGNOSIS — M45.9 ANKYLOSING SPONDYLITIS, UNSPECIFIED SITE OF SPINE: ICD-10-CM

## 2025-04-19 DIAGNOSIS — E06.3 HASHIMOTO'S THYROIDITIS: ICD-10-CM

## 2025-04-19 DIAGNOSIS — M48.062 SPINAL STENOSIS OF LUMBAR REGION WITH NEUROGENIC CLAUDICATION: ICD-10-CM

## 2025-04-19 DIAGNOSIS — D72.819 LEUKOPENIA, UNSPECIFIED TYPE: ICD-10-CM

## 2025-04-19 DIAGNOSIS — F41.9 ANXIETY: ICD-10-CM

## 2025-04-19 DIAGNOSIS — G89.4 CHRONIC PAIN SYNDROME: ICD-10-CM

## 2025-04-21 DIAGNOSIS — E11.69 TYPE 2 DIABETES MELLITUS WITH OTHER SPECIFIED COMPLICATION, UNSPECIFIED WHETHER LONG TERM INSULIN USE: ICD-10-CM

## 2025-04-22 RX ORDER — METFORMIN HYDROCHLORIDE 500 MG/1
TABLET, EXTENDED RELEASE ORAL
Qty: 90 TABLET | Refills: 3 | Status: SHIPPED | OUTPATIENT
Start: 2025-04-22

## 2025-04-22 RX ORDER — TRAZODONE HYDROCHLORIDE 100 MG/1
100 TABLET ORAL NIGHTLY
Qty: 90 TABLET | Refills: 3 | Status: SHIPPED | OUTPATIENT
Start: 2025-04-22

## 2025-04-29 ENCOUNTER — PATIENT MESSAGE (OUTPATIENT)
Dept: ADMINISTRATIVE | Facility: HOSPITAL | Age: 65
End: 2025-04-29
Payer: MEDICARE

## 2025-05-12 ENCOUNTER — OFFICE VISIT (OUTPATIENT)
Dept: RHEUMATOLOGY | Facility: CLINIC | Age: 65
End: 2025-05-12
Payer: MEDICARE

## 2025-05-12 VITALS
HEART RATE: 73 BPM | SYSTOLIC BLOOD PRESSURE: 136 MMHG | WEIGHT: 110.25 LBS | DIASTOLIC BLOOD PRESSURE: 68 MMHG | HEIGHT: 57 IN | BODY MASS INDEX: 23.79 KG/M2

## 2025-05-12 DIAGNOSIS — E11.69 TYPE 2 DIABETES MELLITUS WITH OTHER SPECIFIED COMPLICATION, UNSPECIFIED WHETHER LONG TERM INSULIN USE: Primary | ICD-10-CM

## 2025-05-12 DIAGNOSIS — M35.00 SJOGREN'S SYNDROME, WITH UNSPECIFIED ORGAN INVOLVEMENT: ICD-10-CM

## 2025-05-12 DIAGNOSIS — D69.3 ACUTE ITP: ICD-10-CM

## 2025-05-12 DIAGNOSIS — B37.0 THRUSH OF MOUTH AND ESOPHAGUS: ICD-10-CM

## 2025-05-12 DIAGNOSIS — M45.9 ANKYLOSING SPONDYLITIS, UNSPECIFIED SITE OF SPINE: ICD-10-CM

## 2025-05-12 DIAGNOSIS — M35.1 MCTD (MIXED CONNECTIVE TISSUE DISEASE): ICD-10-CM

## 2025-05-12 DIAGNOSIS — B37.81 THRUSH OF MOUTH AND ESOPHAGUS: ICD-10-CM

## 2025-05-12 DIAGNOSIS — D72.819 LEUKOPENIA, UNSPECIFIED TYPE: ICD-10-CM

## 2025-05-12 DIAGNOSIS — F41.9 ANXIETY: ICD-10-CM

## 2025-05-12 DIAGNOSIS — M47.12 CERVICAL ARTHRITIS WITH MYELOPATHY: ICD-10-CM

## 2025-05-12 DIAGNOSIS — K21.9 GERD WITHOUT ESOPHAGITIS: ICD-10-CM

## 2025-05-12 DIAGNOSIS — E06.3 HASHIMOTO'S THYROIDITIS: ICD-10-CM

## 2025-05-12 DIAGNOSIS — M48.02 CERVICAL SPINAL STENOSIS: ICD-10-CM

## 2025-05-12 DIAGNOSIS — R76.8 ANA POSITIVE: ICD-10-CM

## 2025-05-12 DIAGNOSIS — G89.4 CHRONIC PAIN SYNDROME: ICD-10-CM

## 2025-05-12 DIAGNOSIS — M48.062 SPINAL STENOSIS OF LUMBAR REGION WITH NEUROGENIC CLAUDICATION: ICD-10-CM

## 2025-05-12 PROCEDURE — 1159F MED LIST DOCD IN RCRD: CPT | Mod: CPTII,S$GLB,, | Performed by: INTERNAL MEDICINE

## 2025-05-12 PROCEDURE — 1125F AMNT PAIN NOTED PAIN PRSNT: CPT | Mod: CPTII,S$GLB,, | Performed by: INTERNAL MEDICINE

## 2025-05-12 PROCEDURE — 1101F PT FALLS ASSESS-DOCD LE1/YR: CPT | Mod: CPTII,S$GLB,, | Performed by: INTERNAL MEDICINE

## 2025-05-12 PROCEDURE — 3078F DIAST BP <80 MM HG: CPT | Mod: CPTII,S$GLB,, | Performed by: INTERNAL MEDICINE

## 2025-05-12 PROCEDURE — 3008F BODY MASS INDEX DOCD: CPT | Mod: CPTII,S$GLB,, | Performed by: INTERNAL MEDICINE

## 2025-05-12 PROCEDURE — 1160F RVW MEDS BY RX/DR IN RCRD: CPT | Mod: CPTII,S$GLB,, | Performed by: INTERNAL MEDICINE

## 2025-05-12 PROCEDURE — 4010F ACE/ARB THERAPY RXD/TAKEN: CPT | Mod: CPTII,S$GLB,, | Performed by: INTERNAL MEDICINE

## 2025-05-12 PROCEDURE — 3066F NEPHROPATHY DOC TX: CPT | Mod: CPTII,S$GLB,, | Performed by: INTERNAL MEDICINE

## 2025-05-12 PROCEDURE — 3075F SYST BP GE 130 - 139MM HG: CPT | Mod: CPTII,S$GLB,, | Performed by: INTERNAL MEDICINE

## 2025-05-12 PROCEDURE — 3061F NEG MICROALBUMINURIA REV: CPT | Mod: CPTII,S$GLB,, | Performed by: INTERNAL MEDICINE

## 2025-05-12 PROCEDURE — 99215 OFFICE O/P EST HI 40 MIN: CPT | Mod: S$GLB,,, | Performed by: INTERNAL MEDICINE

## 2025-05-12 PROCEDURE — 99999 PR PBB SHADOW E&M-EST. PATIENT-LVL V: CPT | Mod: PBBFAC,,, | Performed by: INTERNAL MEDICINE

## 2025-05-12 PROCEDURE — 3288F FALL RISK ASSESSMENT DOCD: CPT | Mod: CPTII,S$GLB,, | Performed by: INTERNAL MEDICINE

## 2025-05-12 RX ORDER — HYDROCODONE BITARTRATE AND ACETAMINOPHEN 10; 325 MG/1; MG/1
1 TABLET ORAL EVERY 8 HOURS PRN
Qty: 90 TABLET | Refills: 0 | Status: SHIPPED | OUTPATIENT
Start: 2025-06-10

## 2025-05-12 RX ORDER — HYDROCODONE BITARTRATE AND ACETAMINOPHEN 10; 325 MG/1; MG/1
1 TABLET ORAL EVERY 8 HOURS PRN
Qty: 90 TABLET | Refills: 0 | Status: SHIPPED | OUTPATIENT
Start: 2025-05-12

## 2025-05-12 RX ORDER — TRAZODONE HYDROCHLORIDE 150 MG/1
150 TABLET ORAL NIGHTLY
Qty: 90 TABLET | Refills: 5 | Status: SHIPPED | OUTPATIENT
Start: 2025-05-12

## 2025-05-12 RX ORDER — PREGABALIN 25 MG/1
25 CAPSULE ORAL 3 TIMES DAILY
Qty: 90 CAPSULE | Refills: 3 | Status: SHIPPED | OUTPATIENT
Start: 2025-05-12

## 2025-05-12 RX ORDER — ESTRADIOL 0.1 MG/G
2 CREAM VAGINAL DAILY
COMMUNITY

## 2025-05-12 ASSESSMENT — ROUTINE ASSESSMENT OF PATIENT INDEX DATA (RAPID3)
MDHAQ FUNCTION SCORE: 1.9
PSYCHOLOGICAL DISTRESS SCORE: 7.7
PATIENT GLOBAL ASSESSMENT SCORE: 6
PAIN SCORE: 6.6
TOTAL RAPID3 SCORE: 6.31

## 2025-05-12 NOTE — PROGRESS NOTES
Subjective:     Patient ID:  Malu Holland    Chief Complaint:  Disease Management     History of Present Illness:  Pt is a 65 y.o. female dx w/ ankylosing spondylitis and sjogren's syndrome. Breast discharge neg cancer.  She is doing poorly due to increased pain in her neck and low back. She has multilevel cervical spondylosis and severe neural foraminal narrowing at C5-6 and mild L4-5 central spinal stenosis. She reports compliance with cosentyx monthly and she does feel this helps with inflammation. Her pain is severe and it  wakes her up at night.     She complains of abdominal fullness after eating certain foods. Labs recently w/PCP showed elevated LFTs and repeat testing AST/ALT higher at 66/106 respectively. Abdominal US ordered. She complains of nausea as well. She has gastropheresis and is on pravastatin     He vaginal bleeding  has improved since she had started estrogen cream     Current tx: cosentyx      Rheumatologic History:   - Diagnosis/es:  - Positive serologies:  - Infectious screening labs:  - Previous Treatments:  - Current Treatments:     Interval History:   Hospitalization since last office visit: No    Patient Active Problem List    Diagnosis Date Noted    Type 2 diabetes mellitus with other specified complication, unspecified whether long term insulin use 2025    Acute ITP 2025    Vaping nicotine dependence, tobacco product 2025    Pill dysphagia 2024    Hypertension 2024    Encounter to establish care 2024    Gastroparesis 2024    Chronic pain syndrome 2024    Chronic constipation 2024    Hashimoto's thyroiditis 2024    MCTD (mixed connective tissue disease) 2024    Leukopenia 2024    Ankylosing spondylitis 2024     Past Surgical History:   Procedure Laterality Date    ADENOIDECTOMY      APPENDECTOMY       SECTION      CHOLECYSTECTOMY      COLON SURGERY      x 2; first was in  Dr. Conner due to  "diverticulitis; second one done by Dr. Thomas    COLONOSCOPY  12/2023    done in [place]; repots part of her colon resection "opened up"    ESOPHAGOGASTRODUODENOSCOPY N/A 11/14/2024    Procedure: EGD (ESOPHAGOGASTRODUODENOSCOPY);  Surgeon: Oswald Paniagua Jr., MD;  Location: [facility] ENDO;  Service: Endoscopy;  Laterality: N/A;    HYSTERECTOMY  1992    INJECTION OF ANESTHETIC AGENT AROUND MEDIAL BRANCH NERVES INNERVATING LUMBAR FACET JOINT Bilateral 2/13/2025    Procedure: Block-nerve-medial branch-lumbar l4/5 and l5/s1 MBB ( iv sedation);  Surgeon: Aisha Saals MD;  Location: [facility] OR;  Service: Pain Management;  Laterality: Bilateral;    INJECTION OF ANESTHETIC AGENT AROUND MEDIAL BRANCH NERVES INNERVATING LUMBAR FACET JOINT Bilateral 3/6/2025    Procedure: Block-nerve-medial branch-lumbar l4/5 and l5/s1 MBB #2 ( rn sedation);  Surgeon: Aisha Salas MD;  Location: [facility] OR;  Service: Pain Management;  Laterality: Bilateral;    LASIK      OOPHORECTOMY Left     TONSILLECTOMY      UPPER GASTROINTESTINAL ENDOSCOPY  12/2023    done at [facility]     Social History[1]  Family History   Problem Relation Name Age of Onset    Lupus Mother      Osteoarthritis Mother      Heart disease Mother      Heart disease Father      Cancer Father      Lupus Sister      Colon cancer Neg Hx      Crohn's disease Neg Hx      Ulcerative colitis Neg Hx      Stomach cancer Neg Hx      Esophageal cancer Neg Hx       Review of patient's allergies indicates:   Allergen Reactions    Glucotrol [glipizide] Hallucinations     Low glucose    Cymbalta [duloxetine] Other (See Comments)     Altered mental status       Review of Systems   Review of Systems   Constitutional:  Positive for chills and fatigue. Negative for activity change, appetite change, diaphoresis, fever and unexpected weight change.   HENT:  Negative for congestion, dental problem, ear discharge, ear pain, facial swelling, mouth sores, nosebleeds, postnasal drip, rhinorrhea, " sinus pressure, sneezing, sore throat, tinnitus, trouble swallowing and voice change.    Eyes:  Negative for photophobia, pain, discharge, redness and itching.   Respiratory:  Positive for cough. Negative for apnea, chest tightness, shortness of breath and wheezing.    Cardiovascular:  Positive for leg swelling. Negative for chest pain and palpitations.   Gastrointestinal:  Positive for abdominal pain. Negative for abdominal distention, constipation, diarrhea, nausea and vomiting.   Endocrine: Negative for cold intolerance, heat intolerance, polydipsia and polyuria.   Genitourinary:  Negative for decreased urine volume, difficulty urinating, dysuria, flank pain, frequency, hematuria and urgency.   Musculoskeletal:  Positive for arthralgias, back pain, gait problem, joint swelling, myalgias, neck pain and neck stiffness.   Skin:  Negative for pallor, rash and wound.   Allergic/Immunologic: Negative for immunocompromised state.   Neurological:  Negative for dizziness, tremors, numbness and headaches.   Hematological:  Negative for adenopathy. Does not bruise/bleed easily.   Psychiatric/Behavioral:  Negative for sleep disturbance. The patient is not nervous/anxious.       Current Medications:  Current Outpatient Medications   Medication Instructions    cetirizine (ZYRTEC) 10 mg, Daily PRN    clobetasoL (TEMOVATE) 0.05 % external solution Topical (Top), 2 times daily    COSENTYX PEN (2 PENS) 300 mg, Subcutaneous, Every 28 days    cyclobenzaprine (FLEXERIL) 10 mg, Oral, 3 times daily PRN    cycloSPORINE (RESTASIS) 0.05 % ophthalmic emulsion No dose, route, or frequency recorded.    dicyclomine (BENTYL) 10 MG capsule SMARTSI Capsule(s) By Mouth 4 Times Daily PRN    docusate sodium (COLACE) 100 mg, Oral, 2 times daily    estradioL (ESTRACE) 2 g, Vaginal, Daily    famotidine (PEPCID) 40 mg, Oral, Nightly    HYDROcodone-acetaminophen (NORCO)  mg per tablet 1 tablet, Oral, Every 8 hours PRN    [START ON 6/10/2025]  "HYDROcodone-acetaminophen (NORCO)  mg per tablet 1 tablet, Oral, Every 8 hours PRN    lisinopriL (PRINIVIL,ZESTRIL) 5 mg, Oral, Daily    metFORMIN (GLUCOPHAGE-XR) 500 MG ER 24hr tablet TAKE ONE TABLET BY MOUTH ONCE DAILY WITH BREAKFAST    miscellaneous medical supply (C-TUB) Misc Glucometer and strips#100    multivitamin (THERAGRAN) per tablet 1 tablet    ondansetron (ZOFRAN-ODT) 4 mg, Oral, Every 8 hours PRN    pantoprazole (PROTONIX) 40 mg, Oral, Daily    paroxetine (PAXIL) 20 mg, Oral, Nightly    polyethylene glycol (GLYCOLAX) 17 g, Oral, Daily    pravastatin (PRAVACHOL) 40 mg    pregabalin (LYRICA) 25 mg, Oral, 3 times daily    traZODone (DESYREL) 100 mg, Oral, Nightly    triamcinolone acetonide 0.025% (KENALOG) 0.025 % cream APPLY TO THE AFFECTED AREA TOPICALLY twice daily FOR 7 DAYS    triamcinolone acetonide 0.1% (KENALOG) 0.1 % ointment Topical (Top), 2 times daily    valACYclovir (VALTREX) 1,000 mg, Oral, 3 times daily    zinc gluconate 50 mg         Objective:     Vitals:    05/12/25 1614   BP: 136/68   Pulse: 73   Weight: 50 kg (110 lb 3.7 oz)   Height: 4' 9" (1.448 m)   PainSc:   6   PainLoc: Generalized      Body mass index is 23.85 kg/m².     Physical Examinations:  Physical Exam   Constitutional: She is oriented to person, place, and time.   HENT:   Head: Normocephalic and atraumatic.   Mouth/Throat: Oropharynx is clear and moist.   Eyes: Pupils are equal, round, and reactive to light.   Neck: No thyromegaly present.   Cardiovascular: Normal rate, regular rhythm and normal heart sounds. Exam reveals no gallop and no friction rub.   No murmur heard.  Pulmonary/Chest: Breath sounds normal. She has no wheezes. She has no rales. She exhibits no tenderness.   Abdominal: There is no abdominal tenderness. There is no rebound and no guarding.   Musculoskeletal:         General: Tenderness and deformity present.      Right shoulder: Tenderness present.      Left shoulder: Tenderness present.      Right " elbow: Normal.      Left elbow: Normal.      Cervical back: Neck supple.      Right knee: Swelling and effusion present. Tenderness present.      Left knee: Effusion present.   Lymphadenopathy:     She has no cervical adenopathy.   Neurological: She is alert and oriented to person, place, and time. She has normal sensation. Gait normal.   Reflex Scores:       Patellar reflexes are 3+ on the right side and 3+ on the left side.  Skin: No rash noted. No erythema. No pallor.   Psychiatric: Mood and affect normal.   Vitals reviewed.      Right Side Rheumatological Exam     Examination finds the elbow, 1st MCP, 2nd MCP, 3rd MCP, 4th MCP and 5th MCP normal.    The patient is tender to palpation of the shoulder and knee    She has swelling of the knee    The patient has an enlarged wrist, 1st PIP, 2nd PIP, 3rd PIP, 4th PIP and 5th PIP    Shoulder Exam   Tenderness Location: acromion    Range of Motion   Active abduction:  abnormal   Adduction: abnormal  Sensation: normal    Knee Exam   Tenderness Location: medial joint line  Patellofemoral Crepitus: positive  Effusion: positive  Sensation: normal    Hip Exam   Tenderness Location: no tenderness  Sensation: normal    Elbow/Wrist Exam   Tenderness Location: no tenderness  Sensation: normal    Left Side Rheumatological Exam     Examination finds the elbow, 1st MCP, 2nd MCP, 3rd MCP, 4th MCP and 5th MCP normal.    The patient is tender to palpation of the shoulder.    The patient has an enlarged wrist, 1st PIP, 2nd PIP, 3rd PIP, 4th PIP and 5th PIP.    Shoulder Exam   Tenderness Location: acromion    Range of Motion   Active abduction:  abnormal   Sensation: normal    Knee Exam   Tenderness Location: lateral joint line and medial joint line    Patellofemoral Crepitus: positive  Effusion: positive  Sensation: normal    Hip Exam   Tenderness Location: no tenderness  Sensation: normal    Elbow/Wrist Exam   Sensation: normal      Back/Neck Exam   General Inspection   Gait: normal        Tenderness Right paramedian tenderness of the Lower C-Spine and Lower L-Spine.Left paramedian tenderness of the Upper C-Spine and Lower L-Spine.       Disease Assessment Scores:  Patient's Global Assessment of arthritis (0-10): 1  Physician's Global Assessment of arthritis (0-10): 1  Number of Tender Joints (0-28): 2  Number of Swollen Joints (0-28): 2         No data to display                Monitoring Lab Results:  Lab Results   Component Value Date    WBC 4.68 12/02/2024    RBC 4.84 12/02/2024    HGB 14.6 12/02/2024    HCT 44.6 12/02/2024    MCV 92 12/02/2024    MCH 30.2 12/02/2024    MCHC 32.7 12/02/2024    RDW 15.5 (H) 12/02/2024     12/02/2024        Lab Results   Component Value Date     12/02/2024    K 4.0 12/02/2024     12/02/2024    CO2 25 12/02/2024     (H) 12/02/2024    BUN 19 12/02/2024    CREATININE 0.7 12/02/2024    CALCIUM 10.5 12/02/2024    PROT 7.9 12/02/2024    ALBUMIN 4.2 12/02/2024    BILITOT 0.3 12/02/2024    ALKPHOS 62 12/02/2024    AST 70 (H) 12/02/2024     (H) 12/02/2024    ANIONGAP 15 12/02/2024    EGFRNORACEVR >60.0 12/02/2024       Lab Results   Component Value Date    SEDRATE 15 12/02/2024    CRP <0.3 12/02/2024        Lab Results   Component Value Date    FWAWYQOB82IR 72 08/08/2024    BRFQQMMU95 >2000 (H) 09/25/2023        Lab Results   Component Value Date    CHOL 174 07/29/2024    HDL 60 (H) 07/29/2024    LDLCALC 98 07/29/2024    TRIG 81 07/29/2024       Lab Results   Component Value Date    RF <13.0 08/08/2024    CCPANTIBODIE 1.5 08/08/2024     Lab Results   Component Value Date    ANASCREEN Positive (A) 10/25/2024    ANATITER 1:80 10/25/2024    DSDNA Negative 1:10 10/25/2024     Lab Results   Component Value Date    HLABB27 Negative 02/08/2022       Infectious Disease Screening:  Lab Results   Component Value Date    HEPBSAG Non-reactive 10/25/2024    HEPBSAB Negative 02/08/2022    HEPBIGM Negative 02/08/2022     Lab Results   Component Value  "Date    HEPCAB Negative 02/08/2022     Lab Results   Component Value Date    TBGOLDPLUS Negative 12/02/2024     No results found for: "QUANTIFERON", "SVCMT", "QUANTAGVALUE", "QUANTNILVALU", "QUANTMITOGEN", "QFTTBAG", "QINT"     Imaging:  DEXA, Xrays, MRIs, CTs, etc  Anatomical Region Laterality Modality   Body -- Computed Tomography   Pelvis -- --   Abdomen -- --     Impression    Status post partial colectomy with mild wall thickening at the     Report Date: 3/26/2024 12:05 PM     Electronically Signed By: HAM AYALA     Date:  03/26/2024 12:05  Narrative    EXAM:   ONCT ABDOMEN/PELVIS W/ CONTRAST     CLINICAL HISTORY:  N82.4: OTHER FEMALE INTESTINAL-GENITAL TRACT FISTULAE     COMPARISON:  CT dated 1/19/2024     TECHNIQUE:  Standard thin section axial images with reformatted sagittal and   coronal images after intravenous administration of 100 mL Isovue-370.     FINDINGS:   Liver is normal in size, contour, and enhancement. No focal lesion.   Portal venous system is patent.     Gallbladder surgically absent. Mild prominence of the common bile duct,   unchanged and not uncommon in the setting of prior cholecystectomy.     Spleen is normal in size and enhancement. No focal lesion.     Pancreas is normal in size, contour, and enhancement. No focal lesion or ductal   dilatation.     Adrenal glands are unremarkable.     Kidneys are normal in size and enhancement. No focal lesion, urolithiasis, or   hydroureteronephrosis.     Status post partial colectomy with intact appearing colorectal anastomosis in   the pelvis. Questionable mild wall thickening at the level of the anastomosis.   Status post colovaginal fistula repair as well. Large stool burden noted   throughout the colon. Remainder of the gastrointestinal tract is otherwise   unremarkable.     Urinary bladder is unremarkable.     Uterus is surgically absent. There is a 2.4 x 2.9 cm cystic structure in the   left adnexa, unchanged.     Scattered " atherosclerosis noted.     No suspicious lymphadenopathy.     No significant ascites, pneumoperitoneum, or peritoneal implant.     No acute bony abnormality. No aggressive lytic or blastic lesion. Degenerative   changes noted in the spine and pelvis.     Visualized lower lungs are clear.     IMPRESSION:   Status post partial colectomy with mild wall thickening at the   otherwise intact appearing colorectal anastomosis. No evidence of obstruction.     Status post colovaginal fistula repair with no significant evidence of residual   or recurrent fistula.     2.4 x 2.9 cm cystic structure in the left adnexa presumably ovarian in origin.   Nonemergent dedicated pelvic ultrasound may be obtained for further evaluation   if clinically indicated.     Mildly prominent bile duct, unchanged compared to prior study and not uncommon   in the setting of prior cholecystectomy.       All CT scans at this facility use dose modulation, iterative reconstruction,   and/or weight base dosing when appropriate to reduce radiation dose when   appropriate to reduce radiation dose to as low as reasonably achievable.     Report Date: 3/26/2024 12:05 PM      S Pelvis Comp with Transvag NON-OB (xpd)  Order: [id number]   Status: Final result       Next appt: 05/22/2025 at 10:30 AM in Hepatology (Chanda Bravo NP)       Dx: Vaginal bleeding    Test Result Released: Yes (not seen)       Messages: Not Seen    3 Result Notes       1 Patient Communication       View Follow-Up Encounter  Details    Reading Physician Reading Date Result Priority   Serg Parker MD  [phone] 2/24/2025 Routine     Narrative & Impression  EXAMINATION:  US PELVIS COMP WITH TRANSVAG NON-OB (XPD)     CLINICAL HISTORY:  Abnormal uterine and vaginal bleeding, unspecified     TECHNIQUE:  Transabdominal sonography of the pelvis was performed, followed by transvaginal sonography to better evaluate the uterus and ovaries.     COMPARISON:  None.      FINDINGS:  Uterus:     Surgically removed.     Right ovary:     Surgically removed.     Left ovary:     Size: 5.1 x 2.6 x 3.3 cm     Appearance: Three adjacent simple appearing cystic lesions versus single septated lesion individually measuring up to 3.2 cm.     Vascular Flow: Normal.     Free Fluid:     None.     Impression:     1. Status post hysterectomy and right oophorectomy.  2. Left ovarian cysts.  Recommend follow-up evaluation in 6 months.        Electronically signed by:ARDI Parker MD  Date:                                            02/24/2025  Time:                                           10:5     Old & Outside Medical Records:  Reviewed old and all outside medical records available in Care Everywhere     Assessment:     Encounter Diagnoses   Name Primary?    Type 2 diabetes mellitus with other specified complication, unspecified whether long term insulin use Yes    Ankylosing spondylitis, unspecified site of spine     Chronic pain syndrome     Sjogren's syndrome, with unspecified organ involvement     Cervical spinal stenosis     MCTD (mixed connective tissue disease)     Hashimoto's thyroiditis     GERD without esophagitis     Leukopenia, unspecified type     Anxiety     Cervical arthritis with myelopathy     Spinal stenosis of lumbar region with neurogenic claudication     Thrush of mouth and esophagus     BRENNON positive     Acute ITP        Plan:      Encounter Diagnoses   Name Primary?    Type 2 diabetes mellitus with other specified complication, unspecified whether long term insulin use Yes    Ankylosing spondylitis, unspecified site of spine     Chronic pain syndrome     Sjogren's syndrome, with unspecified organ involvement     Cervical spinal stenosis     MCTD (mixed connective tissue disease)     Hashimoto's thyroiditis     GERD without esophagitis     Leukopenia, unspecified type     Anxiety     Cervical arthritis with myelopathy     Spinal stenosis of lumbar region with neurogenic  claudication     Thrush of mouth and esophagus     BRENNON positive     Acute ITP    Malu was seen today for disease management.    Diagnoses and all orders for this visit:    Type 2 diabetes mellitus with other specified complication, unspecified whether long term insulin use  -     BRENNON Screen w/Reflex; Future  -     Anti-Liver, Kidney, Microsome Ab; Future  -     Anti-Smooth Muscle Antibody; Future  -     Antimitochondrial Antibody; Future  -     Comprehensive Metabolic Panel; Future  -     Sedimentation rate; Future  -     C-Reactive Protein; Future  -     Gamma GT; Future    Ankylosing spondylitis, unspecified site of spine  -     BRENNON Screen w/Reflex; Future  -     Anti-Liver, Kidney, Microsome Ab; Future  -     Anti-Smooth Muscle Antibody; Future  -     Antimitochondrial Antibody; Future  -     Comprehensive Metabolic Panel; Future  -     Sedimentation rate; Future  -     C-Reactive Protein; Future  -     Gamma GT; Future  -     pregabalin (LYRICA) 25 MG capsule; Take 1 capsule (25 mg total) by mouth 3 (three) times daily.  -     HYDROcodone-acetaminophen (NORCO)  mg per tablet; Take 1 tablet by mouth every 8 (eight) hours as needed for Pain.  -     HYDROcodone-acetaminophen (NORCO)  mg per tablet; Take 1 tablet by mouth every 8 (eight) hours as needed for Pain.    Chronic pain syndrome  -     BRENNON Screen w/Reflex; Future  -     Anti-Liver, Kidney, Microsome Ab; Future  -     Anti-Smooth Muscle Antibody; Future  -     Antimitochondrial Antibody; Future  -     Comprehensive Metabolic Panel; Future  -     Sedimentation rate; Future  -     C-Reactive Protein; Future  -     Gamma GT; Future  -     pregabalin (LYRICA) 25 MG capsule; Take 1 capsule (25 mg total) by mouth 3 (three) times daily.  -     HYDROcodone-acetaminophen (NORCO)  mg per tablet; Take 1 tablet by mouth every 8 (eight) hours as needed for Pain.  -     HYDROcodone-acetaminophen (NORCO)  mg per tablet; Take 1 tablet by mouth every  8 (eight) hours as needed for Pain.    Sjogren's syndrome, with unspecified organ involvement  -     BRENNON Screen w/Reflex; Future  -     Anti-Liver, Kidney, Microsome Ab; Future  -     Anti-Smooth Muscle Antibody; Future  -     Antimitochondrial Antibody; Future  -     Comprehensive Metabolic Panel; Future  -     Sedimentation rate; Future  -     C-Reactive Protein; Future  -     Gamma GT; Future  -     pregabalin (LYRICA) 25 MG capsule; Take 1 capsule (25 mg total) by mouth 3 (three) times daily.  -     HYDROcodone-acetaminophen (NORCO)  mg per tablet; Take 1 tablet by mouth every 8 (eight) hours as needed for Pain.  -     HYDROcodone-acetaminophen (NORCO)  mg per tablet; Take 1 tablet by mouth every 8 (eight) hours as needed for Pain.    Cervical spinal stenosis  -     BRENNON Screen w/Reflex; Future  -     Anti-Liver, Kidney, Microsome Ab; Future  -     Anti-Smooth Muscle Antibody; Future  -     Antimitochondrial Antibody; Future  -     Comprehensive Metabolic Panel; Future  -     Sedimentation rate; Future  -     C-Reactive Protein; Future  -     Gamma GT; Future  -     pregabalin (LYRICA) 25 MG capsule; Take 1 capsule (25 mg total) by mouth 3 (three) times daily.  -     HYDROcodone-acetaminophen (NORCO)  mg per tablet; Take 1 tablet by mouth every 8 (eight) hours as needed for Pain.  -     HYDROcodone-acetaminophen (NORCO)  mg per tablet; Take 1 tablet by mouth every 8 (eight) hours as needed for Pain.    MCTD (mixed connective tissue disease)  -     BRENNON Screen w/Reflex; Future  -     Anti-Liver, Kidney, Microsome Ab; Future  -     Anti-Smooth Muscle Antibody; Future  -     Antimitochondrial Antibody; Future  -     Comprehensive Metabolic Panel; Future  -     Sedimentation rate; Future  -     C-Reactive Protein; Future  -     Gamma GT; Future  -     pregabalin (LYRICA) 25 MG capsule; Take 1 capsule (25 mg total) by mouth 3 (three) times daily.  -     HYDROcodone-acetaminophen (NORCO)   mg per tablet; Take 1 tablet by mouth every 8 (eight) hours as needed for Pain.  -     HYDROcodone-acetaminophen (NORCO)  mg per tablet; Take 1 tablet by mouth every 8 (eight) hours as needed for Pain.    Hashimoto's thyroiditis  -     BRENNON Screen w/Reflex; Future  -     Anti-Liver, Kidney, Microsome Ab; Future  -     Anti-Smooth Muscle Antibody; Future  -     Antimitochondrial Antibody; Future  -     Comprehensive Metabolic Panel; Future  -     Sedimentation rate; Future  -     C-Reactive Protein; Future  -     Gamma GT; Future  -     pregabalin (LYRICA) 25 MG capsule; Take 1 capsule (25 mg total) by mouth 3 (three) times daily.  -     HYDROcodone-acetaminophen (NORCO)  mg per tablet; Take 1 tablet by mouth every 8 (eight) hours as needed for Pain.  -     HYDROcodone-acetaminophen (NORCO)  mg per tablet; Take 1 tablet by mouth every 8 (eight) hours as needed for Pain.    GERD without esophagitis  -     pregabalin (LYRICA) 25 MG capsule; Take 1 capsule (25 mg total) by mouth 3 (three) times daily.    Leukopenia, unspecified type  -     pregabalin (LYRICA) 25 MG capsule; Take 1 capsule (25 mg total) by mouth 3 (three) times daily.  -     HYDROcodone-acetaminophen (NORCO)  mg per tablet; Take 1 tablet by mouth every 8 (eight) hours as needed for Pain.  -     HYDROcodone-acetaminophen (NORCO)  mg per tablet; Take 1 tablet by mouth every 8 (eight) hours as needed for Pain.    Anxiety  -     pregabalin (LYRICA) 25 MG capsule; Take 1 capsule (25 mg total) by mouth 3 (three) times daily.  -     HYDROcodone-acetaminophen (NORCO)  mg per tablet; Take 1 tablet by mouth every 8 (eight) hours as needed for Pain.  -     HYDROcodone-acetaminophen (NORCO)  mg per tablet; Take 1 tablet by mouth every 8 (eight) hours as needed for Pain.    Cervical arthritis with myelopathy  -     pregabalin (LYRICA) 25 MG capsule; Take 1 capsule (25 mg total) by mouth 3 (three) times daily.    Spinal  stenosis of lumbar region with neurogenic claudication  -     pregabalin (LYRICA) 25 MG capsule; Take 1 capsule (25 mg total) by mouth 3 (three) times daily.  -     HYDROcodone-acetaminophen (NORCO)  mg per tablet; Take 1 tablet by mouth every 8 (eight) hours as needed for Pain.  -     HYDROcodone-acetaminophen (NORCO)  mg per tablet; Take 1 tablet by mouth every 8 (eight) hours as needed for Pain.    Thrush of mouth and esophagus  -     pregabalin (LYRICA) 25 MG capsule; Take 1 capsule (25 mg total) by mouth 3 (three) times daily.    BERNNON positive  -     pregabalin (LYRICA) 25 MG capsule; Take 1 capsule (25 mg total) by mouth 3 (three) times daily.    Acute ITP  -     pregabalin (LYRICA) 25 MG capsule; Take 1 capsule (25 mg total) by mouth 3 (three) times daily.          1. Continue cosentyx  2. Norco refill  3. Labs ordered     Follow-up 4 months    More than 50% of the  58 minute encounter was spent face to face counseling the patient regarding current status and future plan of care as well as side effects  of the medications. All questions were answered to patient's satisfaction also includes  non-face to face time preparing to see the patient (eg, review of tests), Obtaining and/or reviewing separately obtained history, Documenting clinical information in the electronic or other health record, Independently interpreting results           [1]   Social History  Tobacco Use    Smoking status: Former     Types: Vaping with nicotine     Quit date: 2012     Years since quittin.4    Smokeless tobacco: Never   Substance Use Topics    Alcohol use: Not Currently     Comment: sober    Drug use: Never

## 2025-05-14 ENCOUNTER — LAB VISIT (OUTPATIENT)
Dept: LAB | Facility: HOSPITAL | Age: 65
End: 2025-05-14
Attending: INTERNAL MEDICINE
Payer: MEDICARE

## 2025-05-14 DIAGNOSIS — M45.9 ANKYLOSING SPONDYLITIS, UNSPECIFIED SITE OF SPINE: ICD-10-CM

## 2025-05-14 DIAGNOSIS — M48.02 CERVICAL SPINAL STENOSIS: ICD-10-CM

## 2025-05-14 DIAGNOSIS — M35.00 SJOGREN'S SYNDROME, WITH UNSPECIFIED ORGAN INVOLVEMENT: ICD-10-CM

## 2025-05-14 DIAGNOSIS — M35.1 MCTD (MIXED CONNECTIVE TISSUE DISEASE): ICD-10-CM

## 2025-05-14 DIAGNOSIS — E11.69 TYPE 2 DIABETES MELLITUS WITH OTHER SPECIFIED COMPLICATION, UNSPECIFIED WHETHER LONG TERM INSULIN USE: ICD-10-CM

## 2025-05-14 DIAGNOSIS — G89.4 CHRONIC PAIN SYNDROME: ICD-10-CM

## 2025-05-14 DIAGNOSIS — E06.3 HASHIMOTO'S THYROIDITIS: ICD-10-CM

## 2025-05-14 LAB
ALBUMIN SERPL BCP-MCNC: 3.8 G/DL (ref 3.5–5.2)
ALP SERPL-CCNC: 88 UNIT/L (ref 40–150)
ALT SERPL W/O P-5'-P-CCNC: 219 UNIT/L (ref 10–44)
ANION GAP (OHS): 8 MMOL/L (ref 8–16)
AST SERPL-CCNC: 199 UNIT/L (ref 11–45)
BILIRUB SERPL-MCNC: 0.4 MG/DL (ref 0.1–1)
BUN SERPL-MCNC: 15 MG/DL (ref 8–23)
CALCIUM SERPL-MCNC: 9.8 MG/DL (ref 8.7–10.5)
CHLORIDE SERPL-SCNC: 100 MMOL/L (ref 95–110)
CO2 SERPL-SCNC: 30 MMOL/L (ref 23–29)
CREAT SERPL-MCNC: 0.6 MG/DL (ref 0.5–1.4)
CRP SERPL-MCNC: 0.5 MG/L
ERYTHROCYTE [SEDIMENTATION RATE] IN BLOOD: 13 MM/HR
GFR SERPLBLD CREATININE-BSD FMLA CKD-EPI: >60 ML/MIN/1.73/M2
GLUCOSE SERPL-MCNC: 97 MG/DL (ref 70–110)
POTASSIUM SERPL-SCNC: 3.9 MMOL/L (ref 3.5–5.1)
PROT SERPL-MCNC: 6.9 GM/DL (ref 6–8.4)
SODIUM SERPL-SCNC: 138 MMOL/L (ref 136–145)

## 2025-05-14 PROCEDURE — 86225 DNA ANTIBODY NATIVE: CPT

## 2025-05-14 PROCEDURE — 82977 ASSAY OF GGT: CPT

## 2025-05-14 PROCEDURE — 86376 MICROSOMAL ANTIBODY EACH: CPT

## 2025-05-14 PROCEDURE — 85651 RBC SED RATE NONAUTOMATED: CPT

## 2025-05-14 PROCEDURE — 86381 MITOCHONDRIAL ANTIBODY EACH: CPT

## 2025-05-14 PROCEDURE — 36415 COLL VENOUS BLD VENIPUNCTURE: CPT | Mod: PO

## 2025-05-14 PROCEDURE — 86140 C-REACTIVE PROTEIN: CPT

## 2025-05-14 PROCEDURE — 86235 NUCLEAR ANTIGEN ANTIBODY: CPT | Mod: 59

## 2025-05-14 PROCEDURE — 86039 ANTINUCLEAR ANTIBODIES (ANA): CPT

## 2025-05-14 PROCEDURE — 82040 ASSAY OF SERUM ALBUMIN: CPT

## 2025-05-14 PROCEDURE — 86015 ACTIN ANTIBODY EACH: CPT

## 2025-05-14 PROCEDURE — 86235 NUCLEAR ANTIGEN ANTIBODY: CPT

## 2025-05-15 LAB
ANA (OHS): POSITIVE
ANA PATTERN 1 (OHS): ABNORMAL
ANA TITER 1 (OHS): ABNORMAL
GGT SERPL-CCNC: 121 U/L (ref 8–55)
MITOCHONDRIA AB TITR SER IF: NORMAL {TITER}
SMOOTH MUSCLE AB TITR SER IF: NORMAL {TITER}

## 2025-05-16 LAB
DSDNA ANTIBODY (OHS): NORMAL
DSDNA ANTIBODY TITER (OHS): NORMAL
SM  ANTIBODY (OHS): 0.11 RATIO
SM INTERPRETATION (OHS): NEGATIVE
SM/RNP ANTIBODY (OHS): 0.07 RATIO
SM/RNP INTERPRETATION (OHS): NEGATIVE
SSA  ANTIBODY (OHS): 0.05 RATIO (ref 0–0.99)
SSA INTERPRETATION (OHS): NEGATIVE
SSB  ANTIBODY (OHS): 0.06 RATIO
SSB INTERPRETATION (OHS): NEGATIVE

## 2025-05-19 ENCOUNTER — OFFICE VISIT (OUTPATIENT)
Dept: FAMILY MEDICINE | Facility: CLINIC | Age: 65
End: 2025-05-19
Payer: MEDICARE

## 2025-05-19 VITALS
DIASTOLIC BLOOD PRESSURE: 71 MMHG | OXYGEN SATURATION: 99 % | WEIGHT: 106.63 LBS | HEART RATE: 75 BPM | SYSTOLIC BLOOD PRESSURE: 127 MMHG | RESPIRATION RATE: 18 BRPM | TEMPERATURE: 99 F | HEIGHT: 57 IN | BODY MASS INDEX: 23.01 KG/M2

## 2025-05-19 DIAGNOSIS — B02.8 HERPES ZOSTER WITH OTHER COMPLICATION: ICD-10-CM

## 2025-05-19 DIAGNOSIS — L40.50 PSORIATIC ARTHRITIS: Primary | ICD-10-CM

## 2025-05-19 LAB — W LIVER-KIDNEY MICROSOME AB: 1.1 UNITS

## 2025-05-19 PROCEDURE — 1125F AMNT PAIN NOTED PAIN PRSNT: CPT | Mod: CPTII,S$GLB,, | Performed by: STUDENT IN AN ORGANIZED HEALTH CARE EDUCATION/TRAINING PROGRAM

## 2025-05-19 PROCEDURE — 3074F SYST BP LT 130 MM HG: CPT | Mod: CPTII,S$GLB,, | Performed by: STUDENT IN AN ORGANIZED HEALTH CARE EDUCATION/TRAINING PROGRAM

## 2025-05-19 PROCEDURE — 3288F FALL RISK ASSESSMENT DOCD: CPT | Mod: CPTII,S$GLB,, | Performed by: STUDENT IN AN ORGANIZED HEALTH CARE EDUCATION/TRAINING PROGRAM

## 2025-05-19 PROCEDURE — 3008F BODY MASS INDEX DOCD: CPT | Mod: CPTII,S$GLB,, | Performed by: STUDENT IN AN ORGANIZED HEALTH CARE EDUCATION/TRAINING PROGRAM

## 2025-05-19 PROCEDURE — G2211 COMPLEX E/M VISIT ADD ON: HCPCS | Mod: S$GLB,,, | Performed by: STUDENT IN AN ORGANIZED HEALTH CARE EDUCATION/TRAINING PROGRAM

## 2025-05-19 PROCEDURE — 3078F DIAST BP <80 MM HG: CPT | Mod: CPTII,S$GLB,, | Performed by: STUDENT IN AN ORGANIZED HEALTH CARE EDUCATION/TRAINING PROGRAM

## 2025-05-19 PROCEDURE — 99214 OFFICE O/P EST MOD 30 MIN: CPT | Mod: S$GLB,,, | Performed by: STUDENT IN AN ORGANIZED HEALTH CARE EDUCATION/TRAINING PROGRAM

## 2025-05-19 PROCEDURE — 1159F MED LIST DOCD IN RCRD: CPT | Mod: CPTII,S$GLB,, | Performed by: STUDENT IN AN ORGANIZED HEALTH CARE EDUCATION/TRAINING PROGRAM

## 2025-05-19 PROCEDURE — 3066F NEPHROPATHY DOC TX: CPT | Mod: CPTII,S$GLB,, | Performed by: STUDENT IN AN ORGANIZED HEALTH CARE EDUCATION/TRAINING PROGRAM

## 2025-05-19 PROCEDURE — 1160F RVW MEDS BY RX/DR IN RCRD: CPT | Mod: CPTII,S$GLB,, | Performed by: STUDENT IN AN ORGANIZED HEALTH CARE EDUCATION/TRAINING PROGRAM

## 2025-05-19 PROCEDURE — 99999 PR PBB SHADOW E&M-EST. PATIENT-LVL III: CPT | Mod: PBBFAC,,, | Performed by: STUDENT IN AN ORGANIZED HEALTH CARE EDUCATION/TRAINING PROGRAM

## 2025-05-19 PROCEDURE — 3061F NEG MICROALBUMINURIA REV: CPT | Mod: CPTII,S$GLB,, | Performed by: STUDENT IN AN ORGANIZED HEALTH CARE EDUCATION/TRAINING PROGRAM

## 2025-05-19 PROCEDURE — 4010F ACE/ARB THERAPY RXD/TAKEN: CPT | Mod: CPTII,S$GLB,, | Performed by: STUDENT IN AN ORGANIZED HEALTH CARE EDUCATION/TRAINING PROGRAM

## 2025-05-19 PROCEDURE — 1101F PT FALLS ASSESS-DOCD LE1/YR: CPT | Mod: CPTII,S$GLB,, | Performed by: STUDENT IN AN ORGANIZED HEALTH CARE EDUCATION/TRAINING PROGRAM

## 2025-05-19 RX ORDER — PANTOPRAZOLE SODIUM 40 MG/1
40 TABLET, DELAYED RELEASE ORAL
COMMUNITY

## 2025-05-19 RX ORDER — VALACYCLOVIR HYDROCHLORIDE 1 G/1
1000 TABLET, FILM COATED ORAL 3 TIMES DAILY
Qty: 42 TABLET | Refills: 2 | Status: SHIPPED | OUTPATIENT
Start: 2025-05-19 | End: 2025-06-30

## 2025-05-19 RX ORDER — POLYETHYLENE GLYCOL 3350 17 G/17G
POWDER, FOR SOLUTION ORAL
COMMUNITY

## 2025-05-19 RX ORDER — ASPIRIN 325 MG
50000 TABLET, DELAYED RELEASE (ENTERIC COATED) ORAL
COMMUNITY

## 2025-05-19 NOTE — PATIENT INSTRUCTIONS
Keven Toribio,     If you are due for any health screening(s) below please notify me so we can arrange them to be ordered and scheduled. Most healthy patients at your age complete them, but you are free to accept or refuse.     If you can't do it, I'll definitely understand. If you can, I'd certainly appreciate it!    Tests to Keep You Healthy    Mammogram: Met on 11/27/2024  Eye Exam: DUE  Colon Cancer Screening: DUE  Last Blood Pressure <= 139/89 (2/20/2025): Yes  Last HbA1c < 8 (07/29/2024): Yes  Tobacco Cessation: Yes      Its time for your colon cancer screening     Colorectal cancer is one of the leading causes of cancer death for men and women but it doesnt have to be. Screenings can prevent colorectal cancer or find it early enough to treat and cure the disease.     Our records indicate that you may be overdue for colon cancer screening. A colonoscopy or stool screening test can help identify patients at risk for developing colon cancer. Cancer screenings save lives, so schedule yours today to stay healthy.     A colonoscopy is the preferred test for detecting colon cancer. It is needed only once every 10 years if results are negative. While you are sedated, a flexible, lighted tube with a tiny camera is inserted into the rectum and advanced through the colon to look for cancers.     An alternative screening test that is used at home and returned to the lab may also be used. It detects hidden blood in bowel movements which could indicate cancer in the colon. If results are positive, you will need a colonoscopy to determine if the blood is a sign of cancer. This type of follow up (diagnostic) colonoscopy usually requires additional copays as required by your insurance provider.     If you recently had your colon cancer screening performed outside of [facility], please let your Health care team know so that they can update your health record. Please contact your PCP if you have any questions.    Your  diabetic retinal eye exam is due     Diabetes is the #1 cause of blindness in the [place] - early detection before signs or symptoms develop can prevent debilitating blindness.     Our records indicate that you may be overdue for your annual diabetic eye exam. Eye screening can help identify patients at risk for developing vision loss which is common in diabetes. This simple screening is an important step to keeping you healthy and preventing complications from diabetes.     This recommended diabetic eye exam should take place once per year and can prevent and treat diabetes complications in the eye before developing symptoms. This can be done with a special camera is used to take photographs of the back of your eye without having to dilate them, or you can see an eye doctor for a full dilated exam.     If you recently had your yearly diabetic eye exam performed outside of [facility], please let your Health care team know so that they can update your health record.

## 2025-05-19 NOTE — PROGRESS NOTES
1. Psoriatic arthritis  Overview:  Chronic hx; stable on current meds    Routine follow up with rheum, Dr. Preito       2. Herpes zoster with other complication  -     valACYclovir (VALTREX) 1000 MG tablet; Take 1 tablet (1,000 mg total) by mouth 3 (three) times daily.  Dispense: 42 tablet; Refill: 2        ORAL APHTHAE:  - Patient reports sores inside the mouth, not on the lips.  - prn valtrex    LIVER ISSUES:  - Evaluated liver function in context of previously elevated liver enzymes (ALT and AST).  - Liver has been compromised due to alcohol but is improving.  - Patient has an appointment with a hepatologist on Thursday; recommended discussing liver health and potential alternatives to statins at this appointment.    6-12m f/u            Makenna Rodgers MD  _________________________________________________________________________      Patient ID: Malu Holland is a 65 y.o. female.    History of Present Illness    CHIEF COMPLAINT:  Patient presents today for follow up of multiple chronic conditions    PSORIATIC DISEASE:  She receives Cosentyx injections in both legs for psoriatic arthritis management. She has psoriasis affecting the scalp and ears. No active lesions, currently.    OCULAR:  She reports her glasses are no longer effective. She experiences visual disturbances including floaters and episodes of blurry vision, describing it as if something is covering her eyes. She has a history of retinal/macular problems. She reports difficulty with night vision affecting her ability to drive, leading her to avoid driving at night. Follows with optometry     ORAL HEALTH:  She reports recurrent intraoral sores throughout the oral cavity. She denies fever blisters or cold sores on the lips.    LIVER:  She has history of alcohol abuse with previous excessive drinking. ALT and AST were elevated on recent labs last week. She reports taking milk thistle supplement with perceived improvement.       Past medical histories  reviewed, including past medical, surgical, family and social histories.      Medications Ordered Prior to Encounter[1]    Review of Systems   12 point review of systems negative except for listed in HPI.     Objective:    Nursing note and vitals reviewed.  Vitals:    05/19/25 1018   BP: 127/71   Pulse: 75   Resp: 18   Temp: 98.6 °F (37 °C)     Body mass index is 23.07 kg/m².     Physical Exam   Constitutional: oriented to person, place, and time. well-developed and well-nourished. No distress.   HENT: WNL  Head: Normocephalic and atraumatic.   Eyes: EOM are normal.   Neck: Normal range of motion. Neck supple.   Cardiovascular: Normal rate  Pulmonary/Chest: Effort normal. No respiratory distress.   GI: soft, non distended, no ttp, no rebound/guarding  Musculoskeletal: Normal range of motion. no edema.   Neurological: CN II-XII intact  Skin: warm and dry.   Psychiatric: normal mood and affect. behavior is normal.   Physical Exam                    We Offer Telehealth & Same Day Appointments!   Book your Telehealth appointment with me through my nurse or   Clinic appointments on [address]  [phone]     To Schedule appointments online, go to [facility]: [email or web address]       Visit today included increased complexity associated with the care of the episodic problem addressed and managing the longitudinal care of the patient due to the serious and/or complex managed problem(s) as per problem list.     This note was generated with the assistance of ambient listening technology. Verbal consent was obtained by the patient and accompanying visitor(s) for the recording of patient appointment to facilitate this note. I attest to having reviewed and edited the generated note for accuracy, though some syntax or spelling errors may persist. Please contact the author of this note for any clarification.              [1]   Current Outpatient Medications on File Prior to Visit   Medication Sig  Dispense Refill    cetirizine (ZYRTEC) 10 MG tablet Take 10 mg by mouth daily as needed.      cholecalciferol, vitamin D3, 1,250 mcg (50,000 unit) capsule Take 50,000 Units by mouth every 7 days.      clobetasoL (TEMOVATE) 0.05 % external solution Apply topically 2 (two) times daily. 150 mL 3    cyclobenzaprine (FLEXERIL) 10 MG tablet Take 1 tablet (10 mg total) by mouth 3 (three) times daily as needed for Muscle spasms. 90 tablet 5    cycloSPORINE (RESTASIS) 0.05 % ophthalmic emulsion       dicyclomine (BENTYL) 10 MG capsule SMARTSI Capsule(s) By Mouth 4 Times Daily PRN      docusate sodium (COLACE) 100 MG capsule Take 1 capsule (100 mg total) by mouth 2 (two) times daily. 180 capsule 3    estradioL (ESTRACE) 0.01 % (0.1 mg/gram) vaginal cream Place 2 g vaginally once daily.      famotidine (PEPCID) 40 MG tablet Take 1 tablet (40 mg total) by mouth every evening. 90 tablet 3    HYDROcodone-acetaminophen (NORCO)  mg per tablet Take 1 tablet by mouth every 8 (eight) hours as needed for Pain. 90 tablet 0    [START ON 6/10/2025] HYDROcodone-acetaminophen (NORCO)  mg per tablet Take 1 tablet by mouth every 8 (eight) hours as needed for Pain. 90 tablet 0    lisinopriL-hydrochlorothiazide (PRINZIDE,ZESTORETIC) 20-12.5 mg per tablet Take 1 tablet by mouth once daily. 90 tablet 3    metFORMIN (GLUCOPHAGE-XR) 500 MG ER 24hr tablet TAKE ONE TABLET BY MOUTH ONCE DAILY WITH BREAKFAST 90 tablet 3    miscellaneous medical supply (C-TUB) Misc Glucometer and strips#100      multivitamin (THERAGRAN) per tablet Take 1 tablet by mouth.      ondansetron (ZOFRAN-ODT) 4 MG TbDL Take 1 tablet (4 mg total) by mouth every 8 (eight) hours as needed (nausea). 40 tablet 3    pantoprazole (PROTONIX) 40 MG tablet Take 40 mg by mouth.      paroxetine (PAXIL) 20 MG tablet Take 1 tablet (20 mg total) by mouth every evening. 90 tablet 3    polyethylene glycol (GLYCOLAX) 17 gram PwPk Take 17 g by mouth once daily. 100 each 3     polyethylene glycol (GLYCOLAX) 17 gram PwPk Take by mouth.      pregabalin (LYRICA) 25 MG capsule Take 1 capsule (25 mg total) by mouth 3 (three) times daily. 90 capsule 3    secukinumab (COSENTYX PEN, 2 PENS,) 150 mg/mL PnIj Inject 300 mg into the skin every 28 days. 2 mL 11    traZODone (DESYREL) 150 MG tablet Take 1 tablet (150 mg total) by mouth every evening. 90 tablet 5    triamcinolone acetonide 0.025% (KENALOG) 0.025 % cream APPLY TO THE AFFECTED AREA TOPICALLY twice daily FOR 7 DAYS      triamcinolone acetonide 0.1% (KENALOG) 0.1 % ointment Apply topically 2 (two) times daily. 30 g 3    zinc gluconate 50 mg tablet Take 50 mg by mouth.      [DISCONTINUED] cloNIDine (CATAPRES) 0.1 MG tablet TAKE ONE TABLET BY MOUTH TWICE DAILY      [DISCONTINUED] omeprazole (PRILOSEC) 20 MG capsule Take 20 mg by mouth.       No current facility-administered medications on file prior to visit.

## 2025-05-22 ENCOUNTER — OFFICE VISIT (OUTPATIENT)
Facility: CLINIC | Age: 65
End: 2025-05-22
Payer: MEDICARE

## 2025-05-22 VITALS — BODY MASS INDEX: 23.79 KG/M2 | HEIGHT: 57 IN | WEIGHT: 110.25 LBS

## 2025-05-22 DIAGNOSIS — R74.8 ELEVATED LIVER ENZYMES: ICD-10-CM

## 2025-05-22 DIAGNOSIS — E11.9 TYPE 2 DIABETES MELLITUS WITHOUT COMPLICATION, WITHOUT LONG-TERM CURRENT USE OF INSULIN: ICD-10-CM

## 2025-05-22 PROCEDURE — 99999 PR PBB SHADOW E&M-EST. PATIENT-LVL IV: CPT | Mod: PBBFAC,,, | Performed by: NURSE PRACTITIONER

## 2025-05-22 PROCEDURE — 1159F MED LIST DOCD IN RCRD: CPT | Mod: CPTII,S$GLB,, | Performed by: NURSE PRACTITIONER

## 2025-05-22 PROCEDURE — 99204 OFFICE O/P NEW MOD 45 MIN: CPT | Mod: S$GLB,,, | Performed by: NURSE PRACTITIONER

## 2025-05-22 PROCEDURE — 1101F PT FALLS ASSESS-DOCD LE1/YR: CPT | Mod: CPTII,S$GLB,, | Performed by: NURSE PRACTITIONER

## 2025-05-22 PROCEDURE — 1125F AMNT PAIN NOTED PAIN PRSNT: CPT | Mod: CPTII,S$GLB,, | Performed by: NURSE PRACTITIONER

## 2025-05-22 PROCEDURE — 3066F NEPHROPATHY DOC TX: CPT | Mod: CPTII,S$GLB,, | Performed by: NURSE PRACTITIONER

## 2025-05-22 PROCEDURE — 3061F NEG MICROALBUMINURIA REV: CPT | Mod: CPTII,S$GLB,, | Performed by: NURSE PRACTITIONER

## 2025-05-22 PROCEDURE — 3288F FALL RISK ASSESSMENT DOCD: CPT | Mod: CPTII,S$GLB,, | Performed by: NURSE PRACTITIONER

## 2025-05-22 PROCEDURE — 3008F BODY MASS INDEX DOCD: CPT | Mod: CPTII,S$GLB,, | Performed by: NURSE PRACTITIONER

## 2025-05-22 PROCEDURE — 4010F ACE/ARB THERAPY RXD/TAKEN: CPT | Mod: CPTII,S$GLB,, | Performed by: NURSE PRACTITIONER

## 2025-05-22 NOTE — PROGRESS NOTES
[facility] - New Patient     REFERRING PROVIDER: Dulce Mcclendon  PCP: Makenna Rodgers MD    Chief Complaint: Elevated liver enzymes       HISTORY     This is a 65 y.o. female referred for evaluation of above.    Denies prior diagnosis of liver disease.     She has multiple autoimmune conditions, followed by Rheumatology-   Sjogrens, Lupus, psoriatic arthritis, Hashimoto's thyroiditis, ankylosing spondylitis. Notes a gluten intolerance though no diagnosis of celiac.     Review of labs:  - Transaminases: elevated since 3/2024 and increasing, ALT>AST, ~200  - Alk phos: WNL  - Synthetic liver function: WNL  - Platelets: WNL (previously low)    Workup thus far:  Serologies:   Lab Results   Component Value Date    SMOOTHMUSCAB Negative 1:40 05/14/2025    AMAIFA Negative 1:40 05/14/2025    IGGSERUM 1103 11/21/2019    BRENNON Positive (A) 05/14/2025    ANASCREEN Positive (A) 10/25/2024    FESATURATED 16 (L) 10/25/2024    HEPBSAG Non-reactive 10/25/2024    HEPBIGM Negative 02/08/2022    HEPCAB Negative 02/08/2022    GGT elevated    Abd US 8/21/24- normal liver, CBD enlarged though s/p shaquille    Risk factors for MASLD:   Weight -- Body mass index is 23.85 kg/m².                        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     Family history:  No family history of liver disease, cirrhosis, or liver cancer.    Meds/supplements:  -Rx: cozentyx x 2 years  -OTC, herbs/vitamins/supplements:   No recent medication changes.     No symptoms of hepatic decompensation including jaundice, ascites, cognitive problems to suggest hepatic encephalopathy, or GI bleeding.     Currently on Valtrex for sores in her mouth.   She has had difficulty swallowing lately and worsening  myalgias.            Past medical history, surgical history, problem list, family history, social history, allergies: Reviewed and updated in the appropriate section of the electronic medical record.      Current Medications[1]  Medication list reviewed and updated.      Review of Systems - as per HPI  Constitutional: Negative for unexpected weight change.   Respiratory: Negative for shortness of breath.    Cardiovascular: Negative for leg swelling.  Gastrointestinal: Negative for abdominal distention or abdominal pain. Negative for melena or hematemesis.  Musculoskeletal: Negative for myalgias.    Skin: Negative for jaundice or itching.  Neurological: Negative for confusion or slowed mentation. Negative for tremors.   Hematological: Does not bruise/bleed easily.       Physical Exam   Constitutional: No distress. Alert and oriented.  Eyes: No scleral icterus.   Pulmonary/Chest: Respiratory effort normal. No respiratory distress.   Abdominal: No distension, no ascites appreciated.   Extremities: No edema.   Neurological: No tremor.   Skin: No jaundice.   Psychiatric: Normal mood and affect. Speech, behavior, and thought content normal.       LABS & DIAGNOSTIC STUDIES     I have personally reviewed pertinent laboratory findings:    Lab Results   Component Value Date     (H) 05/14/2025     (H) 05/14/2025    ALKPHOS 88 05/14/2025    BILITOT 0.4 05/14/2025    ALBUMIN 3.8 05/14/2025       Lab Results   Component Value Date    WBC 4.68 12/02/2024    HGB 14.6 12/02/2024    HCT 44.6 12/02/2024    MCV 92 12/02/2024     12/02/2024       Lab Results   Component Value Date     05/14/2025    K 3.9 05/14/2025    BUN 15 05/14/2025    CREATININE 0.6 05/14/2025    ESTGFRAFRICA >60.0 02/18/2020    EGFRNONAA >60.0 02/18/2020       Lab Results   Component Value Date    SMOOTHMUSCAB Negative 1:40 05/14/2025    AMAIFA Negative 1:40 05/14/2025    IGGSERUM 1103 11/21/2019    ANASCREEN Positive (A) 10/25/2024     "FESATURATED 16 (L) 10/25/2024    HEPBSAG Non-reactive 10/25/2024    HEPCAB Negative 02/08/2022       No results found for: "AFP"      I have personally reviewed the following result reports:  Abdominal US - 8/21/24      ASSESSMENT & PLAN     65 y.o. female with:    1. Elevated liver enzymes    2. Type 2 diabetes mellitus without complication, without long-term current use of insulin        Transaminases elevated for the last year and increasing- etiology unclear.  She has multiple autoimmune conditions though ASMA negative. ?medication related; she has been on cosentyx x 2 years. Also currently being treated for HSV infection.     She has risk factors for MASLD though metabolic conditions appear well controlled and current lab pattern not consistent with MASH.     Recommendations:  Continue to trend liver enzymes  Check IgG; discussed that she could be at risk for AIH. May need liver biopsy to assess for this.  CMV, EBV, HSV, TSH, CK  Repeat celiac panel given gluten intolerance   Pending workup, will obtain fibrosis staging with biopsy vs Fibroscan       Orders Placed This Encounter   Procedures    IgG    Celiac Disease Panel    CMV DNA, Quantitative, PCR    Celeste-Barr Virus DNA, Quantitative    Herpes simplex Virus (HSV) Type 1 & 2 DNA by PCR    Hepatic Function Panel    TSH    CK       Return to clinic pending results       Thank you for allowing me to participate in the care of Malu Holland    Chanda Bravo, KERI-C  Hepatology        Duration of encounter: 45 min  This includes face to face time and non-face to face time preparing to see the patient (eg, review of tests), obtaining and/or reviewing separately obtained history, documenting clinical information in the electronic or other health record, independently interpreting results and communicating results to the patient/family/caregiver, or Care coordination.         [1]   Current Outpatient Medications   Medication Sig Dispense Refill    cetirizine " (ZYRTEC) 10 MG tablet Take 10 mg by mouth daily as needed.      cholecalciferol, vitamin D3, 1,250 mcg (50,000 unit) capsule Take 50,000 Units by mouth every 7 days.      clobetasoL (TEMOVATE) 0.05 % external solution Apply topically 2 (two) times daily. 150 mL 3    cyclobenzaprine (FLEXERIL) 10 MG tablet Take 1 tablet (10 mg total) by mouth 3 (three) times daily as needed for Muscle spasms. 90 tablet 5    cycloSPORINE (RESTASIS) 0.05 % ophthalmic emulsion       dicyclomine (BENTYL) 10 MG capsule SMARTSI Capsule(s) By Mouth 4 Times Daily PRN      docusate sodium (COLACE) 100 MG capsule Take 1 capsule (100 mg total) by mouth 2 (two) times daily. 180 capsule 3    estradioL (ESTRACE) 0.01 % (0.1 mg/gram) vaginal cream Place 2 g vaginally once daily.      famotidine (PEPCID) 40 MG tablet Take 1 tablet (40 mg total) by mouth every evening. 90 tablet 3    HYDROcodone-acetaminophen (NORCO)  mg per tablet Take 1 tablet by mouth every 8 (eight) hours as needed for Pain. 90 tablet 0    [START ON 6/10/2025] HYDROcodone-acetaminophen (NORCO)  mg per tablet Take 1 tablet by mouth every 8 (eight) hours as needed for Pain. 90 tablet 0    lisinopriL-hydrochlorothiazide (PRINZIDE,ZESTORETIC) 20-12.5 mg per tablet Take 1 tablet by mouth once daily. 90 tablet 3    metFORMIN (GLUCOPHAGE-XR) 500 MG ER 24hr tablet TAKE ONE TABLET BY MOUTH ONCE DAILY WITH BREAKFAST 90 tablet 3    MILK THISTLE ORAL Take by mouth.      miscellaneous medical supply (C-TUB) Misc Glucometer and strips#100      multivitamin (THERAGRAN) per tablet Take 1 tablet by mouth.      ondansetron (ZOFRAN-ODT) 4 MG TbDL Take 1 tablet (4 mg total) by mouth every 8 (eight) hours as needed (nausea). 40 tablet 3    pantoprazole (PROTONIX) 40 MG tablet Take 40 mg by mouth.      paroxetine (PAXIL) 20 MG tablet Take 1 tablet (20 mg total) by mouth every evening. 90 tablet 3    polyethylene glycol (GLYCOLAX) 17 gram PwPk Take 17 g by mouth once daily. 100 each 3     polyethylene glycol (GLYCOLAX) 17 gram PwPk Take by mouth.      pregabalin (LYRICA) 25 MG capsule Take 1 capsule (25 mg total) by mouth 3 (three) times daily. 90 capsule 3    secukinumab (COSENTYX PEN, 2 PENS,) 150 mg/mL PnIj Inject 300 mg into the skin every 28 days. 2 mL 11    traZODone (DESYREL) 150 MG tablet Take 1 tablet (150 mg total) by mouth every evening. 90 tablet 5    triamcinolone acetonide 0.025% (KENALOG) 0.025 % cream APPLY TO THE AFFECTED AREA TOPICALLY twice daily FOR 7 DAYS      triamcinolone acetonide 0.1% (KENALOG) 0.1 % ointment Apply topically 2 (two) times daily. 30 g 3    valACYclovir (VALTREX) 1000 MG tablet Take 1 tablet (1,000 mg total) by mouth 3 (three) times daily. 42 tablet 2    zinc gluconate 50 mg tablet Take 50 mg by mouth.       No current facility-administered medications for this visit.

## 2025-05-22 NOTE — PATIENT INSTRUCTIONS
Labs to monitor liver enzymes and check a few other tests  Pending results, may need liver biopsy

## 2025-05-26 ENCOUNTER — LAB VISIT (OUTPATIENT)
Dept: LAB | Facility: HOSPITAL | Age: 65
End: 2025-05-26
Attending: NURSE PRACTITIONER
Payer: MEDICARE

## 2025-05-26 DIAGNOSIS — E11.9 TYPE 2 DIABETES MELLITUS WITHOUT COMPLICATION, WITHOUT LONG-TERM CURRENT USE OF INSULIN: ICD-10-CM

## 2025-05-26 DIAGNOSIS — M45.9 ANKYLOSING SPONDYLITIS, UNSPECIFIED SITE OF SPINE: ICD-10-CM

## 2025-05-26 DIAGNOSIS — R74.8 ELEVATED LIVER ENZYMES: ICD-10-CM

## 2025-05-26 LAB
ALBUMIN SERPL BCP-MCNC: 3.7 G/DL (ref 3.5–5.2)
ALP SERPL-CCNC: 72 UNIT/L (ref 40–150)
ALT SERPL W/O P-5'-P-CCNC: 66 UNIT/L (ref 10–44)
AST SERPL-CCNC: 37 UNIT/L (ref 11–45)
BILIRUB DIRECT SERPL-MCNC: 0.1 MG/DL (ref 0.1–0.3)
BILIRUB SERPL-MCNC: 0.3 MG/DL (ref 0.1–1)
CK SERPL-CCNC: 53 U/L (ref 20–180)
IGG SERPL-MCNC: 1143 MG/DL (ref 650–1600)
PROT SERPL-MCNC: 7 GM/DL (ref 6–8.4)
TSH SERPL-ACNC: 1.49 UIU/ML (ref 0.4–4)

## 2025-05-26 PROCEDURE — 82784 ASSAY IGA/IGD/IGG/IGM EACH: CPT | Mod: 59

## 2025-05-26 PROCEDURE — 86258 DGP ANTIBODY EACH IG CLASS: CPT | Mod: 59

## 2025-05-26 PROCEDURE — 36415 COLL VENOUS BLD VENIPUNCTURE: CPT | Mod: PO

## 2025-05-26 PROCEDURE — 80076 HEPATIC FUNCTION PANEL: CPT

## 2025-05-26 PROCEDURE — 84443 ASSAY THYROID STIM HORMONE: CPT

## 2025-05-26 PROCEDURE — 87799 DETECT AGENT NOS DNA QUANT: CPT

## 2025-05-26 PROCEDURE — 82550 ASSAY OF CK (CPK): CPT

## 2025-05-26 PROCEDURE — 87529 HSV DNA AMP PROBE: CPT

## 2025-05-26 RX ORDER — CYCLOBENZAPRINE HCL 10 MG
10 TABLET ORAL 3 TIMES DAILY PRN
Qty: 90 TABLET | Refills: 5 | Status: SHIPPED | OUTPATIENT
Start: 2025-05-26

## 2025-05-26 NOTE — TELEPHONE ENCOUNTER
Pharmacy requesting refill on Cyclobenzaprine 10mg  Pt's LOV 05/12/2025  Pt's NOV 01/27/2026  Medication pending

## 2025-05-27 LAB
CYTOMEGALOVIRUS DNA, QUAL (OHS): NOT DETECTED
EPSTEIN-BARR VIRUS DNA, QUAL (OHS): NORMAL

## 2025-05-29 LAB
HSV-1 DNA BY PCR: NEGATIVE
HSV-2 DNA BY PCR: NEGATIVE
W GLIADIN AB IGA, DEAMIDATED: 0.9 U/ML
W GLIADIN AB IGG, DEAMIDATED: 0.9 U/ML
W IMMUNOGLOBULIN A (IGA): 161 MG/DL
W TISSUE TRANSGLUTAMINASE IGA AB: 0.3 U/ML
W TISSUE TRANSGLUTAMINASE IGG: 0.7 U/ML

## 2025-06-19 ENCOUNTER — RESULTS FOLLOW-UP (OUTPATIENT)
Facility: CLINIC | Age: 65
End: 2025-06-19

## 2025-06-19 DIAGNOSIS — R74.8 ELEVATED LIVER ENZYMES: Primary | ICD-10-CM

## 2025-06-25 ENCOUNTER — LAB VISIT (OUTPATIENT)
Dept: LAB | Facility: HOSPITAL | Age: 65
End: 2025-06-25
Attending: NURSE PRACTITIONER
Payer: MEDICARE

## 2025-06-25 ENCOUNTER — PATIENT OUTREACH (OUTPATIENT)
Dept: ADMINISTRATIVE | Facility: HOSPITAL | Age: 65
End: 2025-06-25
Payer: MEDICARE

## 2025-06-25 ENCOUNTER — TELEPHONE (OUTPATIENT)
Dept: HEPATOLOGY | Facility: CLINIC | Age: 65
End: 2025-06-25
Payer: MEDICARE

## 2025-06-25 DIAGNOSIS — R74.8 ELEVATED LIVER ENZYMES: ICD-10-CM

## 2025-06-25 LAB
ALBUMIN SERPL BCP-MCNC: 4 G/DL (ref 3.5–5.2)
ALP SERPL-CCNC: 83 UNIT/L (ref 40–150)
ALT SERPL W/O P-5'-P-CCNC: 172 UNIT/L (ref 10–44)
AST SERPL-CCNC: 125 UNIT/L (ref 11–45)
BILIRUB DIRECT SERPL-MCNC: 0.2 MG/DL (ref 0.1–0.3)
BILIRUB SERPL-MCNC: 0.5 MG/DL (ref 0.1–1)
PROT SERPL-MCNC: 7.5 GM/DL (ref 6–8.4)

## 2025-06-25 PROCEDURE — 84075 ASSAY ALKALINE PHOSPHATASE: CPT

## 2025-06-25 PROCEDURE — 36415 COLL VENOUS BLD VENIPUNCTURE: CPT | Mod: PO

## 2025-06-25 NOTE — PROGRESS NOTES
"Statin Report: per chart, office visit on 5.19.25 with PCP patient was told to discuss other alternatives to statin medications with Hepatologist. "Patient has an appointment with a hepatologist on Thursday; recommended discussing liver health and potential alternatives to statins at this appointment"    "

## 2025-06-25 NOTE — TELEPHONE ENCOUNTER
----- Message from Danya sent at 6/25/2025  7:08 AM CDT -----  Regarding: Lab Requests  Good morning! Mrs. Holland is at the [facility] in [place] today wanting to get her blood drawn for Chanda Bravo, but her labs are finalized and not in active requests. If y'all could open it back up, that would be great.

## 2025-07-03 ENCOUNTER — RESULTS FOLLOW-UP (OUTPATIENT)
Facility: CLINIC | Age: 65
End: 2025-07-03

## 2025-07-06 DIAGNOSIS — F41.9 ANXIETY: ICD-10-CM

## 2025-07-06 DIAGNOSIS — M48.062 SPINAL STENOSIS OF LUMBAR REGION WITH NEUROGENIC CLAUDICATION: ICD-10-CM

## 2025-07-06 DIAGNOSIS — D72.819 LEUKOPENIA, UNSPECIFIED TYPE: ICD-10-CM

## 2025-07-06 DIAGNOSIS — M35.1 MCTD (MIXED CONNECTIVE TISSUE DISEASE): ICD-10-CM

## 2025-07-06 DIAGNOSIS — E06.3 HASHIMOTO'S THYROIDITIS: ICD-10-CM

## 2025-07-06 DIAGNOSIS — B37.81 THRUSH OF MOUTH AND ESOPHAGUS: ICD-10-CM

## 2025-07-06 DIAGNOSIS — R76.8 ANA POSITIVE: ICD-10-CM

## 2025-07-06 DIAGNOSIS — K21.9 GERD WITHOUT ESOPHAGITIS: ICD-10-CM

## 2025-07-06 DIAGNOSIS — B37.0 THRUSH OF MOUTH AND ESOPHAGUS: ICD-10-CM

## 2025-07-06 DIAGNOSIS — D69.3 ACUTE ITP: ICD-10-CM

## 2025-07-06 DIAGNOSIS — M35.00 SJOGREN'S SYNDROME, WITH UNSPECIFIED ORGAN INVOLVEMENT: ICD-10-CM

## 2025-07-06 DIAGNOSIS — M48.02 CERVICAL SPINAL STENOSIS: ICD-10-CM

## 2025-07-06 DIAGNOSIS — G89.4 CHRONIC PAIN SYNDROME: ICD-10-CM

## 2025-07-06 DIAGNOSIS — M45.9 ANKYLOSING SPONDYLITIS, UNSPECIFIED SITE OF SPINE: ICD-10-CM

## 2025-07-06 DIAGNOSIS — M47.12 CERVICAL ARTHRITIS WITH MYELOPATHY: ICD-10-CM

## 2025-07-10 RX ORDER — PREGABALIN 25 MG/1
25 CAPSULE ORAL 3 TIMES DAILY
Qty: 90 CAPSULE | Refills: 5 | Status: SHIPPED | OUTPATIENT
Start: 2025-07-10

## 2025-07-11 ENCOUNTER — HOSPITAL ENCOUNTER (OUTPATIENT)
Dept: RADIOLOGY | Facility: HOSPITAL | Age: 65
Discharge: HOME | End: 2025-07-11
Attending: OBSTETRICS & GYNECOLOGY
Payer: MEDICARE

## 2025-07-11 DIAGNOSIS — N94.89 ADNEXAL MASS: ICD-10-CM

## 2025-07-11 PROCEDURE — 76830 TRANSVAGINAL US NON-OB: CPT | Mod: 26,,, | Performed by: RADIOLOGY

## 2025-07-11 PROCEDURE — 76856 US EXAM PELVIC COMPLETE: CPT | Mod: TC,PO

## 2025-07-11 PROCEDURE — 76856 US EXAM PELVIC COMPLETE: CPT | Mod: 26,,, | Performed by: RADIOLOGY

## 2025-07-20 DIAGNOSIS — B02.8 HERPES ZOSTER WITH OTHER COMPLICATION: ICD-10-CM

## 2025-07-20 RX ORDER — VALACYCLOVIR HYDROCHLORIDE 1 G/1
1000 TABLET, FILM COATED ORAL 3 TIMES DAILY
Qty: 42 TABLET | Refills: 2 | Status: SHIPPED | OUTPATIENT
Start: 2025-07-20

## 2025-08-05 ENCOUNTER — OFFICE VISIT (OUTPATIENT)
Dept: GASTROENTEROLOGY | Facility: CLINIC | Age: 65
End: 2025-08-05
Payer: MEDICARE

## 2025-08-05 VITALS — WEIGHT: 111.56 LBS | HEIGHT: 56 IN | BODY MASS INDEX: 25.09 KG/M2

## 2025-08-05 DIAGNOSIS — K59.09 CHRONIC CONSTIPATION: ICD-10-CM

## 2025-08-05 DIAGNOSIS — R13.10 PILL DYSPHAGIA: ICD-10-CM

## 2025-08-05 DIAGNOSIS — Z87.19 HISTORY OF DIVERTICULITIS OF COLON: ICD-10-CM

## 2025-08-05 DIAGNOSIS — K21.9 GASTROESOPHAGEAL REFLUX DISEASE, UNSPECIFIED WHETHER ESOPHAGITIS PRESENT: ICD-10-CM

## 2025-08-05 DIAGNOSIS — R19.7 DIARRHEA, UNSPECIFIED TYPE: Primary | ICD-10-CM

## 2025-08-05 DIAGNOSIS — R11.0 NAUSEA: ICD-10-CM

## 2025-08-05 PROCEDURE — 1100F PTFALLS ASSESS-DOCD GE2>/YR: CPT | Mod: CPTII,S$GLB,, | Performed by: INTERNAL MEDICINE

## 2025-08-05 PROCEDURE — 4010F ACE/ARB THERAPY RXD/TAKEN: CPT | Mod: CPTII,S$GLB,, | Performed by: INTERNAL MEDICINE

## 2025-08-05 PROCEDURE — 1159F MED LIST DOCD IN RCRD: CPT | Mod: CPTII,S$GLB,, | Performed by: INTERNAL MEDICINE

## 2025-08-05 PROCEDURE — 3066F NEPHROPATHY DOC TX: CPT | Mod: CPTII,S$GLB,, | Performed by: INTERNAL MEDICINE

## 2025-08-05 PROCEDURE — 3044F HG A1C LEVEL LT 7.0%: CPT | Mod: CPTII,S$GLB,, | Performed by: INTERNAL MEDICINE

## 2025-08-05 PROCEDURE — 1125F AMNT PAIN NOTED PAIN PRSNT: CPT | Mod: CPTII,S$GLB,, | Performed by: INTERNAL MEDICINE

## 2025-08-05 PROCEDURE — 99214 OFFICE O/P EST MOD 30 MIN: CPT | Mod: S$GLB,,, | Performed by: INTERNAL MEDICINE

## 2025-08-05 PROCEDURE — 3061F NEG MICROALBUMINURIA REV: CPT | Mod: CPTII,S$GLB,, | Performed by: INTERNAL MEDICINE

## 2025-08-05 PROCEDURE — 99999 PR PBB SHADOW E&M-EST. PATIENT-LVL II: CPT | Mod: PBBFAC,,, | Performed by: INTERNAL MEDICINE

## 2025-08-05 PROCEDURE — 3008F BODY MASS INDEX DOCD: CPT | Mod: CPTII,S$GLB,, | Performed by: INTERNAL MEDICINE

## 2025-08-05 PROCEDURE — 3288F FALL RISK ASSESSMENT DOCD: CPT | Mod: CPTII,S$GLB,, | Performed by: INTERNAL MEDICINE

## 2025-08-05 PROCEDURE — 1160F RVW MEDS BY RX/DR IN RCRD: CPT | Mod: CPTII,S$GLB,, | Performed by: INTERNAL MEDICINE

## 2025-08-05 RX ORDER — METRONIDAZOLE 500 MG/1
500 TABLET ORAL
COMMUNITY
Start: 2025-07-22

## 2025-08-05 RX ORDER — DICYCLOMINE HYDROCHLORIDE 10 MG/1
10 CAPSULE ORAL
Qty: 120 CAPSULE | Refills: 11 | Status: SHIPPED | OUTPATIENT
Start: 2025-08-05

## 2025-08-05 RX ORDER — DOCUSATE SODIUM 100 MG/1
100 CAPSULE, LIQUID FILLED ORAL 2 TIMES DAILY
Qty: 180 CAPSULE | Refills: 3 | Status: SHIPPED | OUTPATIENT
Start: 2025-08-05

## 2025-08-05 RX ORDER — ONDANSETRON 4 MG/1
4 TABLET, ORALLY DISINTEGRATING ORAL EVERY 8 HOURS PRN
Qty: 40 TABLET | Refills: 3 | Status: SHIPPED | OUTPATIENT
Start: 2025-08-05

## 2025-08-05 RX ORDER — NAPROXEN SODIUM 220 MG/1
81 TABLET, FILM COATED ORAL
COMMUNITY
Start: 2025-07-08

## 2025-08-05 RX ORDER — PANTOPRAZOLE SODIUM 40 MG/1
40 TABLET, DELAYED RELEASE ORAL
Qty: 90 TABLET | Refills: 3 | Status: SHIPPED | OUTPATIENT
Start: 2025-08-05

## 2025-08-05 RX ORDER — ATORVASTATIN CALCIUM 20 MG/1
20 TABLET, FILM COATED ORAL
COMMUNITY
Start: 2025-07-08

## 2025-08-05 RX ORDER — FAMOTIDINE 40 MG/1
40 TABLET, FILM COATED ORAL NIGHTLY
Qty: 90 TABLET | Refills: 3 | Status: SHIPPED | OUTPATIENT
Start: 2025-08-05 | End: 2026-08-05

## 2025-08-13 DIAGNOSIS — R74.8 ELEVATED LIVER ENZYMES: Primary | ICD-10-CM

## 2025-08-14 ENCOUNTER — LAB VISIT (OUTPATIENT)
Dept: LAB | Facility: HOSPITAL | Age: 65
End: 2025-08-14
Attending: NURSE PRACTITIONER
Payer: MEDICARE

## 2025-08-14 DIAGNOSIS — E11.69 TYPE 2 DIABETES MELLITUS WITH OTHER SPECIFIED COMPLICATION, UNSPECIFIED WHETHER LONG TERM INSULIN USE: ICD-10-CM

## 2025-08-14 DIAGNOSIS — R74.8 ELEVATED LIVER ENZYMES: ICD-10-CM

## 2025-08-14 LAB
ALBUMIN SERPL BCP-MCNC: 4.1 G/DL (ref 3.5–5.2)
ALBUMIN/CREAT UR: NORMAL
ALP SERPL-CCNC: 66 UNIT/L (ref 40–150)
ALT SERPL W/O P-5'-P-CCNC: 135 UNIT/L (ref 0–55)
AST SERPL-CCNC: 91 UNIT/L (ref 0–50)
BILIRUB DIRECT SERPL-MCNC: 0.2 MG/DL (ref 0.1–0.3)
BILIRUB SERPL-MCNC: 0.2 MG/DL (ref 0.1–1)
CREAT UR-MCNC: 18 MG/DL (ref 15–325)
MICROALBUMIN UR-MCNC: <5 UG/ML (ref ?–5000)
PROT SERPL-MCNC: 7.2 GM/DL (ref 6–8.4)

## 2025-08-14 PROCEDURE — 36415 COLL VENOUS BLD VENIPUNCTURE: CPT | Mod: PO

## 2025-08-14 PROCEDURE — 80076 HEPATIC FUNCTION PANEL: CPT

## 2025-08-14 PROCEDURE — 82570 ASSAY OF URINE CREATININE: CPT

## 2025-08-19 ENCOUNTER — PATIENT MESSAGE (OUTPATIENT)
Dept: ADMINISTRATIVE | Facility: HOSPITAL | Age: 65
End: 2025-08-19
Payer: MEDICARE

## 2025-08-20 ENCOUNTER — OFFICE VISIT (OUTPATIENT)
Facility: CLINIC | Age: 65
End: 2025-08-20
Payer: MEDICARE

## 2025-08-20 ENCOUNTER — PROCEDURE VISIT (OUTPATIENT)
Facility: CLINIC | Age: 65
End: 2025-08-20
Payer: MEDICARE

## 2025-08-20 ENCOUNTER — TELEPHONE (OUTPATIENT)
Dept: HEPATOLOGY | Facility: CLINIC | Age: 65
End: 2025-08-20
Payer: MEDICARE

## 2025-08-20 VITALS — HEIGHT: 56 IN | WEIGHT: 108.81 LBS | BODY MASS INDEX: 24.48 KG/M2

## 2025-08-20 DIAGNOSIS — R74.8 ELEVATED LIVER ENZYMES: ICD-10-CM

## 2025-08-20 DIAGNOSIS — R74.8 ELEVATED LIVER ENZYMES: Primary | ICD-10-CM

## 2025-08-20 DIAGNOSIS — Z87.898 HISTORY OF ALCOHOL USE DISORDER: ICD-10-CM

## 2025-08-20 PROCEDURE — 3008F BODY MASS INDEX DOCD: CPT | Mod: CPTII,S$GLB,, | Performed by: NURSE PRACTITIONER

## 2025-08-20 PROCEDURE — 1159F MED LIST DOCD IN RCRD: CPT | Mod: CPTII,S$GLB,, | Performed by: NURSE PRACTITIONER

## 2025-08-20 PROCEDURE — 99214 OFFICE O/P EST MOD 30 MIN: CPT | Mod: S$GLB,,, | Performed by: NURSE PRACTITIONER

## 2025-08-20 PROCEDURE — 99999 PR PBB SHADOW E&M-EST. PATIENT-LVL IV: CPT | Mod: PBBFAC,,, | Performed by: NURSE PRACTITIONER

## 2025-08-20 PROCEDURE — 91200 LIVER ELASTOGRAPHY: CPT | Mod: S$GLB,,, | Performed by: NURSE PRACTITIONER

## 2025-08-20 PROCEDURE — 1100F PTFALLS ASSESS-DOCD GE2>/YR: CPT | Mod: CPTII,S$GLB,, | Performed by: NURSE PRACTITIONER

## 2025-08-20 PROCEDURE — 3061F NEG MICROALBUMINURIA REV: CPT | Mod: CPTII,S$GLB,, | Performed by: NURSE PRACTITIONER

## 2025-08-20 PROCEDURE — 3044F HG A1C LEVEL LT 7.0%: CPT | Mod: CPTII,S$GLB,, | Performed by: NURSE PRACTITIONER

## 2025-08-20 PROCEDURE — 3066F NEPHROPATHY DOC TX: CPT | Mod: CPTII,S$GLB,, | Performed by: NURSE PRACTITIONER

## 2025-08-20 PROCEDURE — 1126F AMNT PAIN NOTED NONE PRSNT: CPT | Mod: CPTII,S$GLB,, | Performed by: NURSE PRACTITIONER

## 2025-08-20 PROCEDURE — 4010F ACE/ARB THERAPY RXD/TAKEN: CPT | Mod: CPTII,S$GLB,, | Performed by: NURSE PRACTITIONER

## 2025-08-20 PROCEDURE — 3288F FALL RISK ASSESSMENT DOCD: CPT | Mod: CPTII,S$GLB,, | Performed by: NURSE PRACTITIONER

## (undated) DEVICE — MARKER SKIN RULER STERILE

## (undated) DEVICE — TOWEL OR DISP STRL BLUE 4/PK

## (undated) DEVICE — GLOVE SENSICARE PI MICRO 6

## (undated) DEVICE — NDL SPINAL 25GX3.5 SPINOCAN

## (undated) DEVICE — TRAY NERVE BLOCK